# Patient Record
Sex: MALE | Race: WHITE | HISPANIC OR LATINO | Employment: OTHER | URBAN - METROPOLITAN AREA
[De-identification: names, ages, dates, MRNs, and addresses within clinical notes are randomized per-mention and may not be internally consistent; named-entity substitution may affect disease eponyms.]

---

## 2022-03-29 ENCOUNTER — HOSPITAL ENCOUNTER (INPATIENT)
Facility: MEDICAL CENTER | Age: 67
LOS: 5 days | DRG: 871 | End: 2022-04-03
Attending: EMERGENCY MEDICINE | Admitting: INTERNAL MEDICINE
Payer: COMMERCIAL

## 2022-03-29 ENCOUNTER — APPOINTMENT (OUTPATIENT)
Dept: RADIOLOGY | Facility: MEDICAL CENTER | Age: 67
DRG: 871 | End: 2022-03-29
Attending: EMERGENCY MEDICINE
Payer: COMMERCIAL

## 2022-03-29 DIAGNOSIS — I25.83 CORONARY ARTERY DISEASE DUE TO LIPID RICH PLAQUE: ICD-10-CM

## 2022-03-29 DIAGNOSIS — J96.01 SEPSIS WITH ACUTE HYPOXIC RESPIRATORY FAILURE WITHOUT SEPTIC SHOCK, DUE TO UNSPECIFIED ORGANISM (HCC): Primary | ICD-10-CM

## 2022-03-29 DIAGNOSIS — R65.20 SEPSIS WITH ACUTE HYPOXIC RESPIRATORY FAILURE WITHOUT SEPTIC SHOCK, DUE TO UNSPECIFIED ORGANISM (HCC): Primary | ICD-10-CM

## 2022-03-29 DIAGNOSIS — R06.03 RESPIRATORY DISTRESS: ICD-10-CM

## 2022-03-29 DIAGNOSIS — I50.33 CHF (CONGESTIVE HEART FAILURE), NYHA CLASS I, ACUTE ON CHRONIC, DIASTOLIC (HCC): ICD-10-CM

## 2022-03-29 DIAGNOSIS — R09.02 HYPOXIA: ICD-10-CM

## 2022-03-29 DIAGNOSIS — I50.20 HFREF (HEART FAILURE WITH REDUCED EJECTION FRACTION) (HCC): ICD-10-CM

## 2022-03-29 DIAGNOSIS — A41.9 SEPSIS WITH ACUTE HYPOXIC RESPIRATORY FAILURE WITHOUT SEPTIC SHOCK, DUE TO UNSPECIFIED ORGANISM (HCC): Primary | ICD-10-CM

## 2022-03-29 DIAGNOSIS — I21.4 NSTEMI (NON-ST ELEVATED MYOCARDIAL INFARCTION) (HCC): ICD-10-CM

## 2022-03-29 DIAGNOSIS — N17.9 AKI (ACUTE KIDNEY INJURY) (HCC): ICD-10-CM

## 2022-03-29 DIAGNOSIS — R73.9 HYPERGLYCEMIA: ICD-10-CM

## 2022-03-29 DIAGNOSIS — I25.10 CORONARY ARTERY DISEASE DUE TO LIPID RICH PLAQUE: ICD-10-CM

## 2022-03-29 DIAGNOSIS — I50.9 ACUTE CONGESTIVE HEART FAILURE, UNSPECIFIED HEART FAILURE TYPE (HCC): ICD-10-CM

## 2022-03-29 PROBLEM — J18.9 PNEUMONIA: Status: ACTIVE | Noted: 2022-03-29

## 2022-03-29 LAB
ALBUMIN SERPL BCP-MCNC: 4.7 G/DL (ref 3.2–4.9)
ALBUMIN/GLOB SERPL: 1.7 G/DL
ALP SERPL-CCNC: 94 U/L (ref 30–99)
ALT SERPL-CCNC: 80 U/L (ref 2–50)
ANION GAP SERPL CALC-SCNC: 18 MMOL/L (ref 7–16)
APPEARANCE UR: CLEAR
AST SERPL-CCNC: 82 U/L (ref 12–45)
BACTERIA #/AREA URNS HPF: NEGATIVE /HPF
BASE EXCESS BLDV CALC-SCNC: -7 MMOL/L
BASOPHILS # BLD AUTO: 0.1 % (ref 0–1.8)
BASOPHILS # BLD: 0.02 K/UL (ref 0–0.12)
BILIRUB SERPL-MCNC: 0.3 MG/DL (ref 0.1–1.5)
BILIRUB UR QL STRIP.AUTO: NEGATIVE
BODY TEMPERATURE: 98 CENTIGRADE
BUN SERPL-MCNC: 24 MG/DL (ref 8–22)
CALCIUM SERPL-MCNC: 8.9 MG/DL (ref 8.5–10.5)
CHLORIDE SERPL-SCNC: 102 MMOL/L (ref 96–112)
CO2 SERPL-SCNC: 18 MMOL/L (ref 20–33)
COLOR UR: YELLOW
CREAT SERPL-MCNC: 1.45 MG/DL (ref 0.5–1.4)
D DIMER PPP IA.FEU-MCNC: 1.42 UG/ML (FEU) (ref 0–0.5)
EKG IMPRESSION: NORMAL
EKG IMPRESSION: NORMAL
EOSINOPHIL # BLD AUTO: 0 K/UL (ref 0–0.51)
EOSINOPHIL NFR BLD: 0 % (ref 0–6.9)
EPI CELLS #/AREA URNS HPF: NEGATIVE /HPF
ERYTHROCYTE [DISTWIDTH] IN BLOOD BY AUTOMATED COUNT: 46.8 FL (ref 35.9–50)
FLUAV RNA SPEC QL NAA+PROBE: NEGATIVE
FLUBV RNA SPEC QL NAA+PROBE: NEGATIVE
GFR SERPLBLD CREATININE-BSD FMLA CKD-EPI: 53 ML/MIN/1.73 M 2
GLOBULIN SER CALC-MCNC: 2.8 G/DL (ref 1.9–3.5)
GLUCOSE BLD STRIP.AUTO-MCNC: 498 MG/DL (ref 65–99)
GLUCOSE SERPL-MCNC: 622 MG/DL (ref 65–99)
GLUCOSE UR STRIP.AUTO-MCNC: >=1000 MG/DL
HCO3 BLDV-SCNC: 20 MMOL/L (ref 24–28)
HCT VFR BLD AUTO: 44.9 % (ref 42–52)
HGB BLD-MCNC: 14.7 G/DL (ref 14–18)
HYALINE CASTS #/AREA URNS LPF: ABNORMAL /LPF
IMM GRANULOCYTES # BLD AUTO: 0.08 K/UL (ref 0–0.11)
IMM GRANULOCYTES NFR BLD AUTO: 0.5 % (ref 0–0.9)
INR PPP: 1.01 (ref 0.87–1.13)
KETONES UR STRIP.AUTO-MCNC: 15 MG/DL
LACTATE BLD-SCNC: 3.2 MMOL/L (ref 0.5–2)
LACTATE BLD-SCNC: 4.3 MMOL/L (ref 0.5–2)
LEUKOCYTE ESTERASE UR QL STRIP.AUTO: NEGATIVE
LYMPHOCYTES # BLD AUTO: 0.54 K/UL (ref 1–4.8)
LYMPHOCYTES NFR BLD: 3.6 % (ref 22–41)
MCH RBC QN AUTO: 31.2 PG (ref 27–33)
MCHC RBC AUTO-ENTMCNC: 32.7 G/DL (ref 33.7–35.3)
MCV RBC AUTO: 95.3 FL (ref 81.4–97.8)
MICRO URNS: ABNORMAL
MONOCYTES # BLD AUTO: 1.21 K/UL (ref 0–0.85)
MONOCYTES NFR BLD AUTO: 8.1 % (ref 0–13.4)
NEUTROPHILS # BLD AUTO: 13.18 K/UL (ref 1.82–7.42)
NEUTROPHILS NFR BLD: 87.7 % (ref 44–72)
NITRITE UR QL STRIP.AUTO: NEGATIVE
NRBC # BLD AUTO: 0 K/UL
NRBC BLD-RTO: 0 /100 WBC
NT-PROBNP SERPL IA-MCNC: 8285 PG/ML (ref 0–125)
PCO2 BLDV: 46.4 MMHG (ref 41–51)
PH BLDV: 7.26 [PH] (ref 7.31–7.45)
PH UR STRIP.AUTO: 5 [PH] (ref 5–8)
PLATELET # BLD AUTO: 330 K/UL (ref 164–446)
PMV BLD AUTO: 10.1 FL (ref 9–12.9)
PO2 BLDV: 30.4 MMHG (ref 25–40)
POTASSIUM SERPL-SCNC: 5.9 MMOL/L (ref 3.6–5.5)
PROT SERPL-MCNC: 7.5 G/DL (ref 6–8.2)
PROT UR QL STRIP: 30 MG/DL
PROTHROMBIN TIME: 13 SEC (ref 12–14.6)
RBC # BLD AUTO: 4.71 M/UL (ref 4.7–6.1)
RBC # URNS HPF: ABNORMAL /HPF
RBC UR QL AUTO: ABNORMAL
RSV RNA SPEC QL NAA+PROBE: NEGATIVE
SAO2 % BLDV: 48 %
SARS-COV-2 RNA RESP QL NAA+PROBE: NOTDETECTED
SODIUM SERPL-SCNC: 138 MMOL/L (ref 135–145)
SP GR UR STRIP.AUTO: 1.03
SPECIMEN SOURCE: NORMAL
TROPONIN T SERPL-MCNC: 768 NG/L (ref 6–19)
TROPONIN T SERPL-MCNC: 887 NG/L (ref 6–19)
UROBILINOGEN UR STRIP.AUTO-MCNC: 0.2 MG/DL
WBC # BLD AUTO: 15 K/UL (ref 4.8–10.8)
WBC #/AREA URNS HPF: ABNORMAL /HPF

## 2022-03-29 PROCEDURE — 93005 ELECTROCARDIOGRAM TRACING: CPT | Performed by: EMERGENCY MEDICINE

## 2022-03-29 PROCEDURE — A9270 NON-COVERED ITEM OR SERVICE: HCPCS | Performed by: EMERGENCY MEDICINE

## 2022-03-29 PROCEDURE — 770022 HCHG ROOM/CARE - ICU (200)

## 2022-03-29 PROCEDURE — 80053 COMPREHEN METABOLIC PANEL: CPT

## 2022-03-29 PROCEDURE — 36415 COLL VENOUS BLD VENIPUNCTURE: CPT

## 2022-03-29 PROCEDURE — 94760 N-INVAS EAR/PLS OXIMETRY 1: CPT

## 2022-03-29 PROCEDURE — 0241U HCHG SARS-COV-2 COVID-19 NFCT DS RESP RNA 4 TRGT MIC: CPT

## 2022-03-29 PROCEDURE — C9803 HOPD COVID-19 SPEC COLLECT: HCPCS | Performed by: EMERGENCY MEDICINE

## 2022-03-29 PROCEDURE — 82803 BLOOD GASES ANY COMBINATION: CPT

## 2022-03-29 PROCEDURE — 700117 HCHG RX CONTRAST REV CODE 255: Performed by: EMERGENCY MEDICINE

## 2022-03-29 PROCEDURE — 99292 CRITICAL CARE ADDL 30 MIN: CPT | Performed by: INTERNAL MEDICINE

## 2022-03-29 PROCEDURE — 71275 CT ANGIOGRAPHY CHEST: CPT

## 2022-03-29 PROCEDURE — 83605 ASSAY OF LACTIC ACID: CPT

## 2022-03-29 PROCEDURE — 85379 FIBRIN DEGRADATION QUANT: CPT

## 2022-03-29 PROCEDURE — 700102 HCHG RX REV CODE 250 W/ 637 OVERRIDE(OP): Performed by: EMERGENCY MEDICINE

## 2022-03-29 PROCEDURE — 93005 ELECTROCARDIOGRAM TRACING: CPT

## 2022-03-29 PROCEDURE — 85025 COMPLETE CBC W/AUTO DIFF WBC: CPT

## 2022-03-29 PROCEDURE — 81001 URINALYSIS AUTO W/SCOPE: CPT

## 2022-03-29 PROCEDURE — 99291 CRITICAL CARE FIRST HOUR: CPT | Performed by: INTERNAL MEDICINE

## 2022-03-29 PROCEDURE — 84484 ASSAY OF TROPONIN QUANT: CPT

## 2022-03-29 PROCEDURE — 96375 TX/PRO/DX INJ NEW DRUG ADDON: CPT

## 2022-03-29 PROCEDURE — 87040 BLOOD CULTURE FOR BACTERIA: CPT | Mod: 91

## 2022-03-29 PROCEDURE — 83880 ASSAY OF NATRIURETIC PEPTIDE: CPT

## 2022-03-29 PROCEDURE — 96365 THER/PROPH/DIAG IV INF INIT: CPT

## 2022-03-29 PROCEDURE — 99291 CRITICAL CARE FIRST HOUR: CPT

## 2022-03-29 PROCEDURE — 700105 HCHG RX REV CODE 258: Performed by: INTERNAL MEDICINE

## 2022-03-29 PROCEDURE — 85610 PROTHROMBIN TIME: CPT

## 2022-03-29 PROCEDURE — 82962 GLUCOSE BLOOD TEST: CPT

## 2022-03-29 PROCEDURE — 71045 X-RAY EXAM CHEST 1 VIEW: CPT

## 2022-03-29 PROCEDURE — 700111 HCHG RX REV CODE 636 W/ 250 OVERRIDE (IP): Performed by: EMERGENCY MEDICINE

## 2022-03-29 PROCEDURE — 700105 HCHG RX REV CODE 258: Performed by: EMERGENCY MEDICINE

## 2022-03-29 RX ORDER — FERROUS GLUCONATE 324(38)MG
324 TABLET ORAL
COMMUNITY

## 2022-03-29 RX ORDER — SODIUM CHLORIDE, SODIUM LACTATE, POTASSIUM CHLORIDE, CALCIUM CHLORIDE 600; 310; 30; 20 MG/100ML; MG/100ML; MG/100ML; MG/100ML
INJECTION, SOLUTION INTRAVENOUS CONTINUOUS
Status: DISCONTINUED | OUTPATIENT
Start: 2022-03-29 | End: 2022-03-30

## 2022-03-29 RX ORDER — PIOGLITAZONEHYDROCHLORIDE 45 MG/1
45 TABLET ORAL DAILY
COMMUNITY
End: 2022-04-29

## 2022-03-29 RX ORDER — AZITHROMYCIN 500 MG/1
500 INJECTION, POWDER, LYOPHILIZED, FOR SOLUTION INTRAVENOUS ONCE
Status: COMPLETED | OUTPATIENT
Start: 2022-03-29 | End: 2022-03-29

## 2022-03-29 RX ORDER — LOSARTAN POTASSIUM 100 MG/1
100 TABLET ORAL DAILY
COMMUNITY
End: 2022-04-29

## 2022-03-29 RX ORDER — TRAZODONE HYDROCHLORIDE 100 MG/1
100 TABLET ORAL NIGHTLY
COMMUNITY

## 2022-03-29 RX ORDER — SODIUM CHLORIDE 9 MG/ML
1000 INJECTION, SOLUTION INTRAVENOUS ONCE
Status: COMPLETED | OUTPATIENT
Start: 2022-03-29 | End: 2022-03-29

## 2022-03-29 RX ORDER — SIMVASTATIN 40 MG
40 TABLET ORAL NIGHTLY
COMMUNITY
End: 2022-04-05 | Stop reason: CLARIF

## 2022-03-29 RX ORDER — CEFTRIAXONE 2 G/1
2 INJECTION, POWDER, FOR SOLUTION INTRAMUSCULAR; INTRAVENOUS ONCE
Status: COMPLETED | OUTPATIENT
Start: 2022-03-29 | End: 2022-03-29

## 2022-03-29 RX ORDER — CYANOCOBALAMIN 1000 UG/ML
1000 INJECTION, SOLUTION INTRAMUSCULAR; SUBCUTANEOUS DAILY
COMMUNITY
End: 2022-04-29

## 2022-03-29 RX ORDER — BENZONATATE 100 MG/1
100 CAPSULE ORAL 3 TIMES DAILY PRN
Status: DISCONTINUED | OUTPATIENT
Start: 2022-03-29 | End: 2022-04-03 | Stop reason: HOSPADM

## 2022-03-29 RX ADMIN — CEFTRIAXONE SODIUM 2 G: 2 INJECTION, POWDER, FOR SOLUTION INTRAMUSCULAR; INTRAVENOUS at 21:00

## 2022-03-29 RX ADMIN — SODIUM CHLORIDE, POTASSIUM CHLORIDE, SODIUM LACTATE AND CALCIUM CHLORIDE: 600; 310; 30; 20 INJECTION, SOLUTION INTRAVENOUS at 23:00

## 2022-03-29 RX ADMIN — IOHEXOL 40 ML: 350 INJECTION, SOLUTION INTRAVENOUS at 21:42

## 2022-03-29 RX ADMIN — AZITHROMYCIN MONOHYDRATE 500 MG: 500 INJECTION, POWDER, LYOPHILIZED, FOR SOLUTION INTRAVENOUS at 20:56

## 2022-03-29 RX ADMIN — GUAIFENESIN 200 MG: 100 SOLUTION ORAL at 22:40

## 2022-03-29 RX ADMIN — SODIUM CHLORIDE 1000 ML: 9 INJECTION, SOLUTION INTRAVENOUS at 20:18

## 2022-03-29 RX ADMIN — INSULIN HUMAN 10 UNITS: 100 INJECTION, SOLUTION PARENTERAL at 22:34

## 2022-03-29 RX ADMIN — BENZONATATE 100 MG: 100 CAPSULE ORAL at 22:40

## 2022-03-29 ASSESSMENT — ENCOUNTER SYMPTOMS
PSYCHIATRIC NEGATIVE: 1
WEIGHT LOSS: 0
DIZZINESS: 0
ABDOMINAL PAIN: 0
MUSCULOSKELETAL NEGATIVE: 1
CHILLS: 0
COUGH: 0
SHORTNESS OF BREATH: 1
NAUSEA: 0
VOMITING: 0
BLURRED VISION: 0
FEVER: 0
SORE THROAT: 0
WHEEZING: 0
DOUBLE VISION: 0

## 2022-03-29 ASSESSMENT — FIBROSIS 4 INDEX: FIB4 SCORE: 1.81

## 2022-03-30 ENCOUNTER — PATIENT OUTREACH (OUTPATIENT)
Dept: HEALTH INFORMATION MANAGEMENT | Facility: OTHER | Age: 67
End: 2022-03-30

## 2022-03-30 ENCOUNTER — APPOINTMENT (OUTPATIENT)
Dept: CARDIOLOGY | Facility: MEDICAL CENTER | Age: 67
DRG: 871 | End: 2022-03-30
Attending: STUDENT IN AN ORGANIZED HEALTH CARE EDUCATION/TRAINING PROGRAM
Payer: COMMERCIAL

## 2022-03-30 PROBLEM — I50.20 HFREF (HEART FAILURE WITH REDUCED EJECTION FRACTION) (HCC): Status: ACTIVE | Noted: 2022-03-30

## 2022-03-30 PROBLEM — F10.20 ALCOHOL DEPENDENCY (HCC): Status: ACTIVE | Noted: 2022-03-30

## 2022-03-30 LAB
ALBUMIN SERPL BCP-MCNC: 4.2 G/DL (ref 3.2–4.9)
ALBUMIN/GLOB SERPL: 1.4 G/DL
ALP SERPL-CCNC: 87 U/L (ref 30–99)
ALT SERPL-CCNC: 74 U/L (ref 2–50)
AMPHET UR QL SCN: POSITIVE
ANION GAP SERPL CALC-SCNC: 13 MMOL/L (ref 7–16)
ANION GAP SERPL CALC-SCNC: 13 MMOL/L (ref 7–16)
ANION GAP SERPL CALC-SCNC: 15 MMOL/L (ref 7–16)
AST SERPL-CCNC: 84 U/L (ref 12–45)
B-OH-BUTYR SERPL-MCNC: 0.29 MMOL/L (ref 0.02–0.27)
B-OH-BUTYR SERPL-MCNC: 2.13 MMOL/L (ref 0.02–0.27)
BARBITURATES UR QL SCN: NEGATIVE
BASOPHILS # BLD AUTO: 0.4 % (ref 0–1.8)
BASOPHILS # BLD: 0.08 K/UL (ref 0–0.12)
BENZODIAZ UR QL SCN: NEGATIVE
BILIRUB SERPL-MCNC: 0.3 MG/DL (ref 0.1–1.5)
BUN SERPL-MCNC: 23 MG/DL (ref 8–22)
BUN SERPL-MCNC: 24 MG/DL (ref 8–22)
BUN SERPL-MCNC: 25 MG/DL (ref 8–22)
BZE UR QL SCN: NEGATIVE
CALCIUM SERPL-MCNC: 7.8 MG/DL (ref 8.5–10.5)
CALCIUM SERPL-MCNC: 9.1 MG/DL (ref 8.5–10.5)
CALCIUM SERPL-MCNC: 9.1 MG/DL (ref 8.5–10.5)
CANNABINOIDS UR QL SCN: NEGATIVE
CHLORIDE SERPL-SCNC: 106 MMOL/L (ref 96–112)
CHLORIDE SERPL-SCNC: 108 MMOL/L (ref 96–112)
CHLORIDE SERPL-SCNC: 109 MMOL/L (ref 96–112)
CHOLEST SERPL-MCNC: 207 MG/DL (ref 100–199)
CK SERPL-CCNC: 675 U/L (ref 0–154)
CO2 SERPL-SCNC: 21 MMOL/L (ref 20–33)
CO2 SERPL-SCNC: 22 MMOL/L (ref 20–33)
CO2 SERPL-SCNC: 23 MMOL/L (ref 20–33)
CREAT SERPL-MCNC: 0.86 MG/DL (ref 0.5–1.4)
CREAT SERPL-MCNC: 0.95 MG/DL (ref 0.5–1.4)
CREAT SERPL-MCNC: 1.05 MG/DL (ref 0.5–1.4)
EKG IMPRESSION: NORMAL
EOSINOPHIL # BLD AUTO: 0 K/UL (ref 0–0.51)
EOSINOPHIL NFR BLD: 0 % (ref 0–6.9)
ERYTHROCYTE [DISTWIDTH] IN BLOOD BY AUTOMATED COUNT: 47.8 FL (ref 35.9–50)
EST. AVERAGE GLUCOSE BLD GHB EST-MCNC: 220 MG/DL
ETHANOL BLD-MCNC: <10.1 MG/DL (ref 0–10)
GFR SERPLBLD CREATININE-BSD FMLA CKD-EPI: 78 ML/MIN/1.73 M 2
GFR SERPLBLD CREATININE-BSD FMLA CKD-EPI: 88 ML/MIN/1.73 M 2
GFR SERPLBLD CREATININE-BSD FMLA CKD-EPI: 96 ML/MIN/1.73 M 2
GLOBULIN SER CALC-MCNC: 2.9 G/DL (ref 1.9–3.5)
GLUCOSE BLD STRIP.AUTO-MCNC: 137 MG/DL (ref 65–99)
GLUCOSE BLD STRIP.AUTO-MCNC: 140 MG/DL (ref 65–99)
GLUCOSE BLD STRIP.AUTO-MCNC: 170 MG/DL (ref 65–99)
GLUCOSE BLD STRIP.AUTO-MCNC: 206 MG/DL (ref 65–99)
GLUCOSE BLD STRIP.AUTO-MCNC: 214 MG/DL (ref 65–99)
GLUCOSE BLD STRIP.AUTO-MCNC: 254 MG/DL (ref 65–99)
GLUCOSE BLD STRIP.AUTO-MCNC: 284 MG/DL (ref 65–99)
GLUCOSE BLD STRIP.AUTO-MCNC: 326 MG/DL (ref 65–99)
GLUCOSE SERPL-MCNC: 126 MG/DL (ref 65–99)
GLUCOSE SERPL-MCNC: 236 MG/DL (ref 65–99)
GLUCOSE SERPL-MCNC: 302 MG/DL (ref 65–99)
HBA1C MFR BLD: 9.3 % (ref 4–5.6)
HCT VFR BLD AUTO: 44.5 % (ref 42–52)
HDLC SERPL-MCNC: 71 MG/DL
HGB BLD-MCNC: 14.6 G/DL (ref 14–18)
IMM GRANULOCYTES # BLD AUTO: 0.1 K/UL (ref 0–0.11)
IMM GRANULOCYTES NFR BLD AUTO: 0.5 % (ref 0–0.9)
LACTATE BLD-SCNC: 2 MMOL/L (ref 0.5–2)
LDLC SERPL CALC-MCNC: 101 MG/DL
LYMPHOCYTES # BLD AUTO: 1 K/UL (ref 1–4.8)
LYMPHOCYTES NFR BLD: 4.8 % (ref 22–41)
MAGNESIUM SERPL-MCNC: 2.1 MG/DL (ref 1.5–2.5)
MAGNESIUM SERPL-MCNC: 2.2 MG/DL (ref 1.5–2.5)
MCH RBC QN AUTO: 31.3 PG (ref 27–33)
MCHC RBC AUTO-ENTMCNC: 32.8 G/DL (ref 33.7–35.3)
MCV RBC AUTO: 95.3 FL (ref 81.4–97.8)
METHADONE UR QL SCN: NEGATIVE
MONOCYTES # BLD AUTO: 1.87 K/UL (ref 0–0.85)
MONOCYTES NFR BLD AUTO: 9.1 % (ref 0–13.4)
NEUTROPHILS # BLD AUTO: 17.59 K/UL (ref 1.82–7.42)
NEUTROPHILS NFR BLD: 85.2 % (ref 44–72)
NRBC # BLD AUTO: 0 K/UL
NRBC BLD-RTO: 0 /100 WBC
OPIATES UR QL SCN: NEGATIVE
OXYCODONE UR QL SCN: NEGATIVE
PCP UR QL SCN: NEGATIVE
PHOSPHATE SERPL-MCNC: 2.3 MG/DL (ref 2.5–4.5)
PHOSPHATE SERPL-MCNC: 2.5 MG/DL (ref 2.5–4.5)
PLATELET # BLD AUTO: 298 K/UL (ref 164–446)
PMV BLD AUTO: 10 FL (ref 9–12.9)
POTASSIUM SERPL-SCNC: 3.8 MMOL/L (ref 3.6–5.5)
POTASSIUM SERPL-SCNC: 3.8 MMOL/L (ref 3.6–5.5)
POTASSIUM SERPL-SCNC: 5.1 MMOL/L (ref 3.6–5.5)
PROCALCITONIN SERPL-MCNC: 0.15 NG/ML
PROPOXYPH UR QL SCN: NEGATIVE
PROT SERPL-MCNC: 7.1 G/DL (ref 6–8.2)
RBC # BLD AUTO: 4.67 M/UL (ref 4.7–6.1)
SODIUM SERPL-SCNC: 142 MMOL/L (ref 135–145)
SODIUM SERPL-SCNC: 144 MMOL/L (ref 135–145)
SODIUM SERPL-SCNC: 144 MMOL/L (ref 135–145)
TRIGL SERPL-MCNC: 177 MG/DL (ref 0–149)
TROPONIN T SERPL-MCNC: 943 NG/L (ref 6–19)
WBC # BLD AUTO: 20.6 K/UL (ref 4.8–10.8)

## 2022-03-30 PROCEDURE — 93010 ELECTROCARDIOGRAM REPORT: CPT | Performed by: INTERNAL MEDICINE

## 2022-03-30 PROCEDURE — 85025 COMPLETE CBC W/AUTO DIFF WBC: CPT

## 2022-03-30 PROCEDURE — 700102 HCHG RX REV CODE 250 W/ 637 OVERRIDE(OP): Performed by: NURSE PRACTITIONER

## 2022-03-30 PROCEDURE — 82010 KETONE BODYS QUAN: CPT

## 2022-03-30 PROCEDURE — 83605 ASSAY OF LACTIC ACID: CPT

## 2022-03-30 PROCEDURE — 93306 TTE W/DOPPLER COMPLETE: CPT | Mod: 26 | Performed by: INTERNAL MEDICINE

## 2022-03-30 PROCEDURE — 94660 CPAP INITIATION&MGMT: CPT

## 2022-03-30 PROCEDURE — 82077 ASSAY SPEC XCP UR&BREATH IA: CPT

## 2022-03-30 PROCEDURE — 84145 PROCALCITONIN (PCT): CPT

## 2022-03-30 PROCEDURE — 80307 DRUG TEST PRSMV CHEM ANLYZR: CPT

## 2022-03-30 PROCEDURE — 99292 CRITICAL CARE ADDL 30 MIN: CPT | Performed by: INTERNAL MEDICINE

## 2022-03-30 PROCEDURE — 700105 HCHG RX REV CODE 258: Performed by: INTERNAL MEDICINE

## 2022-03-30 PROCEDURE — 80053 COMPREHEN METABOLIC PANEL: CPT

## 2022-03-30 PROCEDURE — 770022 HCHG ROOM/CARE - ICU (200)

## 2022-03-30 PROCEDURE — 80048 BASIC METABOLIC PNL TOTAL CA: CPT

## 2022-03-30 PROCEDURE — 80061 LIPID PANEL: CPT

## 2022-03-30 PROCEDURE — 82550 ASSAY OF CK (CPK): CPT

## 2022-03-30 PROCEDURE — 700105 HCHG RX REV CODE 258: Performed by: STUDENT IN AN ORGANIZED HEALTH CARE EDUCATION/TRAINING PROGRAM

## 2022-03-30 PROCEDURE — 99291 CRITICAL CARE FIRST HOUR: CPT | Performed by: NURSE PRACTITIONER

## 2022-03-30 PROCEDURE — 700111 HCHG RX REV CODE 636 W/ 250 OVERRIDE (IP): Performed by: STUDENT IN AN ORGANIZED HEALTH CARE EDUCATION/TRAINING PROGRAM

## 2022-03-30 PROCEDURE — 700102 HCHG RX REV CODE 250 W/ 637 OVERRIDE(OP): Performed by: INTERNAL MEDICINE

## 2022-03-30 PROCEDURE — 83735 ASSAY OF MAGNESIUM: CPT

## 2022-03-30 PROCEDURE — A9270 NON-COVERED ITEM OR SERVICE: HCPCS | Performed by: STUDENT IN AN ORGANIZED HEALTH CARE EDUCATION/TRAINING PROGRAM

## 2022-03-30 PROCEDURE — 84100 ASSAY OF PHOSPHORUS: CPT

## 2022-03-30 PROCEDURE — 99223 1ST HOSP IP/OBS HIGH 75: CPT | Performed by: INTERNAL MEDICINE

## 2022-03-30 PROCEDURE — 82962 GLUCOSE BLOOD TEST: CPT | Mod: 91

## 2022-03-30 PROCEDURE — A9270 NON-COVERED ITEM OR SERVICE: HCPCS | Performed by: INTERNAL MEDICINE

## 2022-03-30 PROCEDURE — A9270 NON-COVERED ITEM OR SERVICE: HCPCS | Performed by: NURSE PRACTITIONER

## 2022-03-30 PROCEDURE — 700111 HCHG RX REV CODE 636 W/ 250 OVERRIDE (IP): Performed by: NURSE PRACTITIONER

## 2022-03-30 PROCEDURE — 84484 ASSAY OF TROPONIN QUANT: CPT

## 2022-03-30 PROCEDURE — 93306 TTE W/DOPPLER COMPLETE: CPT

## 2022-03-30 PROCEDURE — 83036 HEMOGLOBIN GLYCOSYLATED A1C: CPT

## 2022-03-30 PROCEDURE — 700117 HCHG RX CONTRAST REV CODE 255: Performed by: STUDENT IN AN ORGANIZED HEALTH CARE EDUCATION/TRAINING PROGRAM

## 2022-03-30 PROCEDURE — 93005 ELECTROCARDIOGRAM TRACING: CPT | Performed by: NURSE PRACTITIONER

## 2022-03-30 PROCEDURE — 700102 HCHG RX REV CODE 250 W/ 637 OVERRIDE(OP): Performed by: STUDENT IN AN ORGANIZED HEALTH CARE EDUCATION/TRAINING PROGRAM

## 2022-03-30 RX ORDER — SODIUM CHLORIDE, SODIUM LACTATE, POTASSIUM CHLORIDE, CALCIUM CHLORIDE 600; 310; 30; 20 MG/100ML; MG/100ML; MG/100ML; MG/100ML
INJECTION, SOLUTION INTRAVENOUS CONTINUOUS
Status: DISCONTINUED | OUTPATIENT
Start: 2022-03-30 | End: 2022-03-30

## 2022-03-30 RX ORDER — DOXYCYCLINE 100 MG/1
100 TABLET ORAL EVERY 12 HOURS
Status: COMPLETED | OUTPATIENT
Start: 2022-03-30 | End: 2022-04-03

## 2022-03-30 RX ORDER — SPIRONOLACTONE 25 MG/1
12.5 TABLET ORAL
Status: DISCONTINUED | OUTPATIENT
Start: 2022-03-31 | End: 2022-04-03 | Stop reason: HOSPADM

## 2022-03-30 RX ORDER — MAGNESIUM SULFATE HEPTAHYDRATE 40 MG/ML
4 INJECTION, SOLUTION INTRAVENOUS
Status: DISCONTINUED | OUTPATIENT
Start: 2022-03-30 | End: 2022-03-30

## 2022-03-30 RX ORDER — MAGNESIUM SULFATE HEPTAHYDRATE 40 MG/ML
2 INJECTION, SOLUTION INTRAVENOUS
Status: DISCONTINUED | OUTPATIENT
Start: 2022-03-30 | End: 2022-03-30

## 2022-03-30 RX ORDER — ATORVASTATIN CALCIUM 40 MG/1
40 TABLET, FILM COATED ORAL EVERY EVENING
Status: DISCONTINUED | OUTPATIENT
Start: 2022-03-30 | End: 2022-03-31

## 2022-03-30 RX ORDER — FUROSEMIDE 10 MG/ML
40 INJECTION INTRAMUSCULAR; INTRAVENOUS ONCE
Status: COMPLETED | OUTPATIENT
Start: 2022-03-30 | End: 2022-03-30

## 2022-03-30 RX ORDER — LOSARTAN POTASSIUM 25 MG/1
25 TABLET ORAL
Status: DISCONTINUED | OUTPATIENT
Start: 2022-03-30 | End: 2022-04-03 | Stop reason: HOSPADM

## 2022-03-30 RX ORDER — AZITHROMYCIN 500 MG/5ML
500 INJECTION, POWDER, LYOPHILIZED, FOR SOLUTION INTRAVENOUS EVERY 24 HOURS
Status: DISCONTINUED | OUTPATIENT
Start: 2022-03-30 | End: 2022-03-30

## 2022-03-30 RX ORDER — DEXTROSE AND SODIUM CHLORIDE 10; .45 G/100ML; G/100ML
INJECTION, SOLUTION INTRAVENOUS CONTINUOUS
Status: ACTIVE | OUTPATIENT
Start: 2022-03-30 | End: 2022-03-30

## 2022-03-30 RX ORDER — CHOLECALCIFEROL (VITAMIN D3) 125 MCG
5 CAPSULE ORAL ONCE
Status: COMPLETED | OUTPATIENT
Start: 2022-03-30 | End: 2022-03-30

## 2022-03-30 RX ORDER — DEXTROSE, SODIUM CHLORIDE, SODIUM LACTATE, POTASSIUM CHLORIDE, AND CALCIUM CHLORIDE 5; .6; .31; .03; .02 G/100ML; G/100ML; G/100ML; G/100ML; G/100ML
INJECTION, SOLUTION INTRAVENOUS CONTINUOUS
Status: ACTIVE | OUTPATIENT
Start: 2022-03-30 | End: 2022-03-30

## 2022-03-30 RX ORDER — SODIUM CHLORIDE, SODIUM LACTATE, POTASSIUM CHLORIDE, AND CALCIUM CHLORIDE .6; .31; .03; .02 G/100ML; G/100ML; G/100ML; G/100ML
2000 INJECTION, SOLUTION INTRAVENOUS ONCE
Status: DISCONTINUED | OUTPATIENT
Start: 2022-03-30 | End: 2022-03-30

## 2022-03-30 RX ORDER — SODIUM CHLORIDE 9 MG/ML
2000 INJECTION, SOLUTION INTRAVENOUS ONCE
Status: DISCONTINUED | OUTPATIENT
Start: 2022-03-30 | End: 2022-03-30

## 2022-03-30 RX ORDER — CLOPIDOGREL BISULFATE 75 MG/1
75 TABLET ORAL DAILY
Status: DISCONTINUED | OUTPATIENT
Start: 2022-03-30 | End: 2022-04-03 | Stop reason: HOSPADM

## 2022-03-30 RX ORDER — ASPIRIN 325 MG
325 TABLET ORAL ONCE
Status: COMPLETED | OUTPATIENT
Start: 2022-03-30 | End: 2022-03-30

## 2022-03-30 RX ADMIN — Medication 5 MG: at 23:30

## 2022-03-30 RX ADMIN — ATORVASTATIN CALCIUM 40 MG: 40 TABLET, FILM COATED ORAL at 17:06

## 2022-03-30 RX ADMIN — INSULIN HUMAN 4 UNITS: 100 INJECTION, SOLUTION PARENTERAL at 21:58

## 2022-03-30 RX ADMIN — ENOXAPARIN SODIUM 80 MG: 80 INJECTION SUBCUTANEOUS at 09:24

## 2022-03-30 RX ADMIN — DOXYCYCLINE 100 MG: 100 TABLET, FILM COATED ORAL at 13:12

## 2022-03-30 RX ADMIN — INSULIN HUMAN 4 UNITS: 100 INJECTION, SOLUTION PARENTERAL at 18:48

## 2022-03-30 RX ADMIN — SODIUM CHLORIDE 4 UNITS/HR: 9 INJECTION, SOLUTION INTRAVENOUS at 05:57

## 2022-03-30 RX ADMIN — CLOPIDOGREL BISULFATE 75 MG: 75 TABLET ORAL at 09:24

## 2022-03-30 RX ADMIN — ENOXAPARIN SODIUM 80 MG: 80 INJECTION SUBCUTANEOUS at 17:08

## 2022-03-30 RX ADMIN — INSULIN HUMAN 3 UNITS: 100 INJECTION, SOLUTION PARENTERAL at 13:39

## 2022-03-30 RX ADMIN — INSULIN GLARGINE 30 UNITS: 100 INJECTION, SOLUTION SUBCUTANEOUS at 10:40

## 2022-03-30 RX ADMIN — ASPIRIN 325 MG: 325 TABLET ORAL at 05:37

## 2022-03-30 RX ADMIN — HUMAN ALBUMIN MICROSPHERES AND PERFLUTREN 3 ML: 10; .22 INJECTION, SOLUTION INTRAVENOUS at 11:31

## 2022-03-30 RX ADMIN — CEFTRIAXONE SODIUM 2 G: 10 INJECTION, POWDER, FOR SOLUTION INTRAVENOUS at 13:12

## 2022-03-30 RX ADMIN — LOSARTAN POTASSIUM 25 MG: 50 TABLET, FILM COATED ORAL at 17:06

## 2022-03-30 RX ADMIN — SODIUM CHLORIDE, SODIUM LACTATE, POTASSIUM CHLORIDE, CALCIUM CHLORIDE AND DEXTROSE MONOHYDRATE: 5; 600; 310; 30; 20 INJECTION, SOLUTION INTRAVENOUS at 08:39

## 2022-03-30 RX ADMIN — FUROSEMIDE 40 MG: 10 INJECTION, SOLUTION INTRAMUSCULAR; INTRAVENOUS at 09:24

## 2022-03-30 RX ADMIN — FUROSEMIDE 40 MG: 10 INJECTION, SOLUTION INTRAMUSCULAR; INTRAVENOUS at 11:22

## 2022-03-30 ASSESSMENT — PULMONARY FUNCTION TESTS
EPAP_CMH2O: 8

## 2022-03-30 ASSESSMENT — COGNITIVE AND FUNCTIONAL STATUS - GENERAL
DAILY ACTIVITIY SCORE: 24
SUGGESTED CMS G CODE MODIFIER DAILY ACTIVITY: CH

## 2022-03-30 ASSESSMENT — ENCOUNTER SYMPTOMS
TINGLING: 0
DIZZINESS: 0
HEADACHES: 0
BLOOD IN STOOL: 0
WEAKNESS: 0
SHORTNESS OF BREATH: 1
LOSS OF CONSCIOUSNESS: 0
VOMITING: 0
DIARRHEA: 0
FEVER: 0
COUGH: 0
BLURRED VISION: 0
CONSTIPATION: 0
NAUSEA: 0
CHILLS: 0
PALPITATIONS: 0
MYALGIAS: 1
ABDOMINAL PAIN: 0
SEIZURES: 0

## 2022-03-30 ASSESSMENT — PATIENT HEALTH QUESTIONNAIRE - PHQ9
2. FEELING DOWN, DEPRESSED, IRRITABLE, OR HOPELESS: NOT AT ALL
SUM OF ALL RESPONSES TO PHQ9 QUESTIONS 1 AND 2: 0
1. LITTLE INTEREST OR PLEASURE IN DOING THINGS: NOT AT ALL

## 2022-03-30 ASSESSMENT — PAIN DESCRIPTION - PAIN TYPE
TYPE: ACUTE PAIN
TYPE: ACUTE PAIN

## 2022-03-30 NOTE — ASSESSMENT & PLAN NOTE
Cardiology consult  Cardiomegaly noted on scans  Echo EF  30-35% with hypokinetic RV  Forced diuresis and wean off of BIPAP  Follow guideline directed medical therapy for HFrE

## 2022-03-30 NOTE — CONSULTS
Critical Care Consultation  (This note will serve as the admission H&P)    Date of consult: 3/29/2022    Referring Physician  Julius Prather M.D.    Reason for Consultation  I was asked to provide critical care consultation for acute hypoxic respiratory failure, sepsis, and hyperglycemia    History of Presenting Illness  66 y.o. male who presented 3/29/2022 with history of non-insulin-dependent diabetes mellitus who is visiting [place] at the [facility] this weekend.  He did not bring his Metformin or Actos.  About 12 hours prior to hospital admission he had a sudden onset of shortness of breath.  He states that he has had trouble breathing in the past but does not have asthma or COPD.  He is fully vaccinated to COVID-19.  He does describe vague chest pressure but denies sore throat cough nausea vomiting or diarrhea.  He does state that he is quite thirsty.    He drinks approximately 12-18 beers per day and does state that he gets the shakes if he does not drink enough alcohol.    While in the emergency department he underwent a CT angiogram of the chest which revealed no evidence of pulmonary edema.  However there are diffuse pulmonary intrainfiltrates that are suggestive of Covid 19.  Small bilateral pleural effusions are noted as well as cardiomegaly and hepatomegaly.  He was treated for sepsis with gentle hydration 1 L of crystalloid solution as well as ceftriaxone and azithromycin.    Critical care consultation was requested for further evaluation and management.    Code Status  No Order    Review of Systems  Review of Systems   Constitutional: Negative for chills, fever, malaise/fatigue and weight loss.   HENT: Negative for sore throat.    Eyes: Negative for blurred vision and double vision.   Respiratory: Positive for shortness of breath. Negative for cough and wheezing.    Cardiovascular: Positive for chest pain.   Gastrointestinal: Negative for abdominal pain, nausea and vomiting.   Genitourinary: Positive for  frequency.   Musculoskeletal: Negative.    Neurological: Negative for dizziness.   Psychiatric/Behavioral: Negative.        Past Medical History   has a past medical history of Diabetes (HCC), Hypertension, and Pulmonary edema.    Surgical History   has no past surgical history on file.    Family History  family history is not on file.    Social History   reports that he has been smoking cigarettes. He has been smoking about 0.50 packs per day. He has never used smokeless tobacco. He reports previous alcohol use. He reports previous drug use.    Medications  Home Medications     Reviewed by Yany Carpenter (Pharmacy Tech) on 03/29/22 at 2209  Med List Status: Complete   Medication Last Dose Status   cyanocobalamin (VITAMIN B-12) 1000 MCG/ML Solution >2 days Active   Empagliflozin 25 MG Tab >2 days Active   ferrous gluconate (FERGON) 324 (38 Fe) MG Tab >2 days Active   losartan (COZAAR) 100 MG Tab >2 days Active   metformin (GLUCOPHAGE) 1000 MG tablet >2 days Active   pioglitazone (ACTOS) 45 MG Tab >2 days Active   simvastatin (ZOCOR) 40 MG Tab >2 days Active   traZODone (DESYREL) 50 MG Tab >2 days Active              Current Facility-Administered Medications   Medication Dose Route Frequency Provider Last Rate Last Admin   • cefTRIAXone (Rocephin) syringe 2 g  2 g Intravenous Q24HRS Leonor Christine M.D.       • azithromycin (ZITHROMAX) 500 mg in D5W 250 mL IVPB premix  500 mg Intravenous Q24HRS Leonor Christine M.D.       • benzonatate (TESSALON) capsule 100 mg  100 mg Oral TID PRN Kennedy Villatoro   100 mg at 03/29/22 2240   • lactated ringers infusion   Intravenous Continuous Julius Prather M.D. 75 mL/hr at 03/29/22 2300 New Bag at 03/29/22 2300       Allergies  No Known Allergies    Vital Signs last 24 hours  Temp:  [36.7 °C (98 °F)] 36.7 °C (98 °F)  Pulse:  [115-126] 115  Resp:  [20-43] 23  BP: (115-181)/() 153/83  SpO2:  [91 %-99 %] 99 %    Physical Exam  Physical Exam  Constitutional:        General: He is in acute distress.      Comments: Speaking in 2-3 word sentences, mild accessory muscle use with prominent SCM use   HENT:      Mouth/Throat:      Mouth: Mucous membranes are dry.   Eyes:      General: No scleral icterus.     Extraocular Movements: Extraocular movements intact.      Pupils: Pupils are equal, round, and reactive to light.   Cardiovascular:      Rate and Rhythm: Regular rhythm. Tachycardia present.      Heart sounds: No murmur heard.    No friction rub. No gallop.      Comments: Bedside cardiac ultrasound reveals no pericardial effusion.  No obvious focal wall motion abnormality.  IVC diameter 1.2 cm and completely collapsing with respiratory variation.  Pulmonary:      Breath sounds: Rales present.   Abdominal:      General: There is distension.      Palpations: There is no mass.      Tenderness: There is no abdominal tenderness. There is no guarding.   Musculoskeletal:      Cervical back: Neck supple.      Right lower leg: No edema.      Left lower leg: No edema.   Skin:     General: Skin is warm and dry.   Neurological:      General: No focal deficit present.      Mental Status: He is alert and oriented to person, place, and time.      Cranial Nerves: No cranial nerve deficit.   Psychiatric:         Mood and Affect: Mood normal.         Behavior: Behavior normal.         Fluids    Intake/Output Summary (Last 24 hours) at 3/30/2022 0352  Last data filed at 3/29/2022 2211  Gross per 24 hour   Intake 1000 ml   Output --   Net 1000 ml       Laboratory  Recent Results (from the past 48 hour(s))   CBC with Differential    Collection Time: 03/29/22  8:00 PM   Result Value Ref Range    WBC 15.0 (H) 4.8 - 10.8 K/uL    RBC 4.71 4.70 - 6.10 M/uL    Hemoglobin 14.7 14.0 - 18.0 g/dL    Hematocrit 44.9 42.0 - 52.0 %    MCV 95.3 81.4 - 97.8 fL    MCH 31.2 27.0 - 33.0 pg    MCHC 32.7 (L) 33.7 - 35.3 g/dL    RDW 46.8 35.9 - 50.0 fL    Platelet Count 330 164 - 446 K/uL    MPV 10.1 9.0 - 12.9 fL     Neutrophils-Polys 87.70 (H) 44.00 - 72.00 %    Lymphocytes 3.60 (L) 22.00 - 41.00 %    Monocytes 8.10 0.00 - 13.40 %    Eosinophils 0.00 0.00 - 6.90 %    Basophils 0.10 0.00 - 1.80 %    Immature Granulocytes 0.50 0.00 - 0.90 %    Nucleated RBC 0.00 /100 WBC    Neutrophils (Absolute) 13.18 (H) 1.82 - 7.42 K/uL    Lymphs (Absolute) 0.54 (L) 1.00 - 4.80 K/uL    Monos (Absolute) 1.21 (H) 0.00 - 0.85 K/uL    Eos (Absolute) 0.00 0.00 - 0.51 K/uL    Baso (Absolute) 0.02 0.00 - 0.12 K/uL    Immature Granulocytes (abs) 0.08 0.00 - 0.11 K/uL    NRBC (Absolute) 0.00 K/uL   Comp Metabolic Panel    Collection Time: 03/29/22  8:00 PM   Result Value Ref Range    Sodium 138 135 - 145 mmol/L    Potassium 5.9 (H) 3.6 - 5.5 mmol/L    Chloride 102 96 - 112 mmol/L    Co2 18 (L) 20 - 33 mmol/L    Anion Gap 18.0 (H) 7.0 - 16.0    Glucose 622 (HH) 65 - 99 mg/dL    Bun 24 (H) 8 - 22 mg/dL    Creatinine 1.45 (H) 0.50 - 1.40 mg/dL    Calcium 8.9 8.5 - 10.5 mg/dL    AST(SGOT) 82 (H) 12 - 45 U/L    ALT(SGPT) 80 (H) 2 - 50 U/L    Alkaline Phosphatase 94 30 - 99 U/L    Total Bilirubin 0.3 0.1 - 1.5 mg/dL    Albumin 4.7 3.2 - 4.9 g/dL    Total Protein 7.5 6.0 - 8.2 g/dL    Globulin 2.8 1.9 - 3.5 g/dL    A-G Ratio 1.7 g/dL   LACTIC ACID    Collection Time: 03/29/22  8:00 PM   Result Value Ref Range    Lactic Acid 4.3 (HH) 0.5 - 2.0 mmol/L   TROPONIN    Collection Time: 03/29/22  8:00 PM   Result Value Ref Range    Troponin T 768 (H) 6 - 19 ng/L   proBrain Natriuretic Peptide, NT    Collection Time: 03/29/22  8:00 PM   Result Value Ref Range    NT-proBNP 8285 (H) 0 - 125 pg/mL   D-DIMER    Collection Time: 03/29/22  8:00 PM   Result Value Ref Range    D-Dimer Screen 1.42 (H) 0.00 - 0.50 ug/mL (FEU)   ESTIMATED GFR    Collection Time: 03/29/22  8:00 PM   Result Value Ref Range    GFR (CKD-EPI) 53 (A) >60 mL/min/1.73 m 2   Prothrombin time (INR)    Collection Time: 03/29/22  8:00 PM   Result Value Ref Range    PT 13.0 12.0 - 14.6 sec    INR 1.01 0.87  - 1.13   EKG    Collection Time: 22  8:00 PM   Result Value Ref Range    Report       [facility] Emergency Dept.    Test Date:  2022  Pt Name:    RAUL RODRIGUEZ                 Department: ER  MRN:        [id number]                      Room:       RD 04  Gender:     Male                         Technician: [id number]  :        1955                   Requested By:ER TRIAGE PROTOCOL  Order #:    [id number]                    Reading MD: Kennedy Villatoro    Measurements  Intervals                                Axis  Rate:       126                          P:          46  PA:         141                          QRS:        57  QRSD:       112                          T:          77  QT:         324  QTc:        470    Interpretive Statements  Sinus tachycardia  Inferolateral infarct, old  No previous ECG available for comparison  Electronically Signed On 3- 20:10:07 PDT by Kennedy Villatoro     CoV-2, FLU A/B, and RSV by PCR (2-4 Hours CEPHEID) : Collect NP swab in VTM    Collection Time: 22  8:20 PM    Specimen: Respirate   Result Value Ref Range    Influenza virus A RNA Negative Negative    Influenza virus B, PCR Negative Negative    RSV, PCR Negative Negative    SARS-CoV-2 by PCR NotDetected     SARS-CoV-2 Source NP Swab    EKG    Collection Time: 22  9:24 PM   Result Value Ref Range    Report       [facility] Emergency Dept.    Test Date:  2022  Pt Name:    RAUL RODRIGUEZ                 Department: ER  MRN:        [id number]                      Room:       RD 04  Gender:     Male                         Technician: [id number]  :        1955                   Requested By:ER TRIAGE PROTOCOL  Order #:    [id number]                    Reading MD: Kennedy Villatoro    Measurements  Intervals                                Axis  Rate:       120                          P:          45  PA:         107                          QRS:         66  QRSD:       106                          T:          77  QT:         332  QTc:        470    Interpretive Statements  SINUS TACHYCARDIA  BORDERLINE INTRAVENTRICULAR CONDUCTION DELAY  INFERIOR INFARCT, OLD  Compared to ECG 03/29/2022 20:00:11  No significant changes  Electronically Signed On 3- 23:04:02 PDT by Kennedy Villatoro     Lactic Acid    Collection Time: 03/29/22 10:29 PM   Result Value Ref Range    Lactic Acid 3.2 (H) 0.5 - 2.0 mmol/L   VENOUS BLOOD GAS    Collection Time: 03/29/22 10:29 PM   Result Value Ref Range    Venous Bg Ph 7.26 (L) 7.31 - 7.45    Venous Bg Pco2 46.4 41.0 - 51.0 mmHg    Venous Bg Po2 30.4 25.0 - 40.0 mmHg    Venous Bg O2 Saturation 48.0 %    Venous Bg Hco3 20 (L) 24 - 28 mmol/L    Venous Bg Base Excess -7 mmol/L    Body Temp 98.0 Centigrade   TROPONIN    Collection Time: 03/29/22 10:29 PM   Result Value Ref Range    Troponin T 887 (H) 6 - 19 ng/L   POCT glucose device results    Collection Time: 03/29/22 10:34 PM   Result Value Ref Range    POC Glucose, Blood 498 (HH) 65 - 99 mg/dL   Urinalysis    Collection Time: 03/29/22 11:25 PM    Specimen: Urine   Result Value Ref Range    Color Yellow     Character Clear     Specific Gravity 1.033 <1.035    Ph 5.0 5.0 - 8.0    Glucose >=1000 (A) Negative mg/dL    Ketones 15 (A) Negative mg/dL    Protein 30 (A) Negative mg/dL    Bilirubin Negative Negative    Urobilinogen, Urine 0.2 Negative    Nitrite Negative Negative    Leukocyte Esterase Negative Negative    Occult Blood Small (A) Negative    Micro Urine Req Microscopic    URINE MICROSCOPIC (W/UA)    Collection Time: 03/29/22 11:25 PM   Result Value Ref Range    WBC 0-2 (A) /hpf    RBC 0-2 (A) /hpf    Bacteria Negative None /hpf    Epithelial Cells Negative /hpf    Hyaline Cast 0-2 /lpf   POCT glucose device results    Collection Time: 03/29/22 11:46 PM   Result Value Ref Range    POC Glucose, Blood 326 (H) 65 - 99 mg/dL   DIAGNOSTIC ALCOHOL    Collection Time: 03/30/22  1:15 AM    Result Value Ref Range    Diagnostic Alcohol <10.1 0.0 - 10.0 mg/dL   Lactic Acid Every four hours after STAT order    Collection Time: 03/30/22  2:35 AM   Result Value Ref Range    Lactic Acid 2.0 0.5 - 2.0 mmol/L   BETA-HYDROXYBUTYRIC ACID    Collection Time: 03/30/22  2:35 AM   Result Value Ref Range    beta-Hydroxybutyric Acid 2.13 (H) 0.02 - 0.27 mmol/L   TROPONIN    Collection Time: 03/30/22  2:35 AM   Result Value Ref Range    Troponin T 943 (H) 6 - 19 ng/L       Imaging  CT-CTA CHEST PULMONARY ARTERY W/ RECONS   Final Result         1.  No pulmonary embolus appreciated.   2.  Extensive bilateral pulmonary infiltrates, appearance suggests possible Covid infiltrates.   3.  Small bilateral pleural effusions.   4.  Cardiomegaly   5.  Hepatomegaly and diffuse hepatic steatosis   6.  Atherosclerosis and atherosclerotic coronary artery disease.      DX-CHEST-PORTABLE (1 VIEW)   Final Result      1.  Mild interstitial pulmonary edema      2.  Enlarged cardiac silhouette          Assessment/Plan  * Pneumonia- (present on admission)  Assessment & Plan  Community-acquired pneumonia, CT findings consistent with COVID-19  Broad-spectrum antibiotics until cultures and sensitivities can guide de-escalation  Supplemental oxygen to maintain oxygen saturations greater than 93%.    Alcohol dependency (HCC)  Assessment & Plan  Monitor and treat for alcohol withdrawal syndrome as clinically indicated    Sepsis (HCC)  Assessment & Plan  This is Sepsis Present on admission  SIRS criteria identified on my evaluation include: Tachycardia, with heart rate greater than 90 BPM, Tachypnea, with respirations greater than 20 per minute and Leukocytosis, with WBC greater than 12,000  Source is pulmonary  Sepsis protocol initiated  Fluid resuscitation ordered per protocol  Crystalloid Fluid Administration: Patient has advanced or end-stage heart failure (with documentation of NYHA class III or symptoms with minimal exertion, Or NYHA class  IV or symptoms at rest or with activity), for this/these reason(s) it is unsafe for this patient to receive 30 mL/kg of fluid  Partial Fluid Dose Given: 15 mL/kg per current or ideal body weight, patient to be reassessed shortly after completion of partial fluid bolus to ensure adequacy of resuscitation  IV antibiotics as appropriate for source of sepsis  Reassessment: I have reassessed the patient's hemodynamic status          Hyperglycemia  Assessment & Plan  Medication noncompliance  Clinically dehydrated  Judicious volume resuscitation  Monitor glycemic control with insulin therapy  Anion gap may be due to just lactic acidosis versus addition of ketones  Check serum ketones    NSTEMI (non-ST elevated myocardial infarction) (HCC)  Assessment & Plan  Significantly elevated troponin and BNP  New Q waves in the inferior leads compared to 2020, silent MI recently?  Trend troponins  Echocardiogram  Cardiology consultation      Discussed patient condition and risk of morbidity and/or mortality with Hospitalist and ERP.      The patient remains critically ill,  I have assessed and reassessed the blood pressure,  cardiovascular status, labs and response to interventions. This patient remains at high risk for worsening shock and death without the above critical care interventions.    Critical care time 90 minutes in directly providing and coordinating critical care and extensive data review.  No time overlap and excludes procedures.

## 2022-03-30 NOTE — CARE PLAN
The patient is Watcher - Medium risk of patient condition declining or worsening    Shift Goals  Clinical Goals: Get off insulin gtt, reduce O2 need  Patient Goals: Drink water/eat food  Family Goals: THOM    Progress made toward(s) clinical / shift goals:  Off insulin gtt, subQ insulin, diet ordered, BIPAP off, SpO2 stable on 6L NC      Problem: Fluid Volume  Goal: Fluid volume balance will be maintained  Outcome: Progressing     Problem: Respiratory  Goal: Patient will achieve/maintain optimum respiratory ventilation and gas exchange  Outcome: Progressing     Problem: Physical Regulation  Goal: Diagnostic test results will improve  Outcome: Progressing     Problem: Skin Integrity  Goal: Skin integrity is maintained or improved  Outcome: Progressing

## 2022-03-30 NOTE — HEART FAILURE PROGRAM
Patient with [facility] coverage. Notes indicate that he is visiting the [facility] and [place] [facility], patient does have a local address in the facesheet.    Primary reason for admission is noted by critical care consult to be Pneumonia. Additionally noted to have hyperglycemia with BS of 524 and decompensation of heart failure.    Providers: below are Guideline Directed Medical Therapy (GDMT) for HFrEF. If any cannot be prescribed by discharge, would you please note the clinical reason for each in your discharge summary? Thanks! Denise  • Evidence Based Beta Blocker (bisoprolol, carvedilol, or toprol xl), for EF of 40% or less  • HENNY - I, for EF of 40% or less ARNI is preferred If not cost prohibitive for patient  • SGLT2 inhibitor with proven ASCVD, HF, or DKD benefit Jardiance/empagliflozin): If not cost prohibitive for patient  • Aldosterone antagonist, for EF of 35% or less, if applicable  • Anticoagulation for atrial arrhythmia, if applicable  • Glycemic control for DM + HF, if applicable  • Lipid lowering medication for DM + HF, if applicable  • Hydralazine Hydrochloride/Isosorbide Dinitrate. The combination of hydralazine and isosorbide- dinitrate is recommended to reduce morbidity and mortality for patients self-described  Americans with NYHA class III-IV HFrEF (EF 40% or less), receiving optimal therapy with ACE inhibitors and beta blockers, unless contraindicated (Class I, JIGNESH: A).  • Pneumococcal vaccine, if not previously received  • Influenza vaccine for current season, if not previously received  • Smoking cessation counseling documented, if applicable  • Device screening, if applicable  • Referral to disease management program specializing in heart failure care- requested that schedulers notify me if patient cannot be scheduled at the [facility]

## 2022-03-30 NOTE — ED NOTES
Report given to Perez MACKEY, Pt to [id number] via Perez MACKEY. Pt with all belongings in possession. Pt is on cardiac monitoring and non-re breather at 10L. Report has been given to Perez MACKEY

## 2022-03-30 NOTE — ED NOTES
Isabel from Lab called with critical result of Glucose 622 and Troponin 768 at 2119. Critical lab result read back to Isabel.   Dr. Villatoro notified of critical lab result at 2119.  Critical lab result read back by Dr. Villatoro.

## 2022-03-30 NOTE — CARE PLAN
The patient is Watcher - Medium risk of patient condition declining or worsening         Progress made toward(s) clinical / shift goals: Patient with improvement on bipap. Insulin gtt started     Patient is not progressing towards the following goals:  Continues to be very tachypneic

## 2022-03-30 NOTE — ED TRIAGE NOTES
"Chief Complaint   Patient presents with   • Shortness of Breath   • Cough   • Weakness       BIB EMS to [facility], pt on monitor and in gown, labs drawn and sent. [place] [facility] [place] reports this morning woke up and felt like he had a fever. States he has been SOB and having a cough all day, and reports having weakness. Hx of HTN, DM and pulmonary edema. Pt has albuterol prescribed and is on metformin. EMS arrived and pt's room air sat is 79%, placed pt on 21L of oxygen. When pt arrived ERP lowered to 5L NC 90%.  Pt is COVID vaccinated. Pt has been uncompliant with meds at home. FSBS 527. Pt reports having an episode of black stool. Denies pain. GCS 15//    BP (!) 172/106   Pulse (!) 126   Resp (!) 41   Ht 1.676 m (5' 6\")   Wt 83.9 kg (185 lb)   SpO2 91%   BMI 29.86 kg/m²   "

## 2022-03-30 NOTE — ASSESSMENT & PLAN NOTE
Significantly elevated troponin and BNP  Abnormal ECG, inferior, lateral Q waves with early R wave progression-possible old posterior MI  CTA showed calcified coronary artery disease including left main, proximal LAD, proximal circumflex  Trend troponins  Lovenox x48 hours as well as Plavix and ASA for NSTEMI  Echocardiogram-EF 30-35% with wall motion abnormalities and hypokinetic RV  Cardiology consultation- discussed findings with cardiolog, Dr. Bailey . At this time pt not a candidate for cath lab due to meth use and will be directed to fu with out pt cardiology

## 2022-03-30 NOTE — DISCHARGE PLANNING
Anticipated Discharge Disposition: possible home when medically cleared; ICU level of care     Action: RN CM attended IDT rounds and reviewed patient chart.  Patient is a [facility] patient and uses [facility] pharmacy.  [place] for the first time in 3 years when all of his symptoms began.  Patient has a history of alcohol abuse and drinks 12-18 beers per day.  Independent with ALD's prior to admission.      Patient requiring bipap, insulin gtt, and ICU level of care.  Diabetic education and heart failure follow-up at discharge needed.        Barriers to Discharge: medical clearance; ICU level of care    Plan: HCM to continue to follow and assist with discharge planning needs and barriers       Care Transition Team Assessment  Emergency Contact  Cornelia Lam (friend) [phone]    Information Source  Orientation Level: Oriented X4  Information Given By: Patient  Informant's Name: Tucker Frederick  Who is responsible for making decisions for patient? : Patient    Readmission Evaluation  Is this a readmission?: No    Elopement Risk  Legal Hold: No  Ambulatory or Self Mobile in Wheelchair: Yes  Disoriented: No  Psychiatric Symptoms: None  History of Wandering: No    Discharge Preparedness  What is your plan after discharge?: Home with help  What are your discharge supports?: Other (comment) (friend)  Prior Functional Level: Ambulatory,Independent with Activities of Daily Living  Difficulity with ADLs: None  Difficulity with IADLs: None    Functional Assesment  Prior Functional Level: Ambulatory,Independent with Activities of Daily Living    Finances  Financial Barriers to Discharge: No  Prescription Coverage: Yes    Advance Directive  Advance Directive?: None    Domestic Abuse  Have you ever been the victim of abuse or violence?: No    Psychological Assessment  History of Substance Abuse: Methamphetamine,Alcohol  Date Last Used - Alcohol:  (drinks 12-18 beers daily)  Date Last Used -  Methamphetamine: 3/29/2022  Substance Abuse Comments:  (pt reported using meth around 2am after not having used for 3 years)  History of Psychiatric Problems: No  Non-compliant with Treatment: Yes  Newly Diagnosed Illness: Yes    Discharge Risks or Barriers  Discharge risks or barriers?: Complex medical needs,Lives alone, no community support  Patient risk factors: Complex medical needs,Lives alone and no community support,Substance abuse    Anticipated Discharge Information  Discharge Disposition: D/T to home under A care in anticipation of covered skilled care (06)

## 2022-03-30 NOTE — CONSULTS
Consults   Cardiology Initial Consultation    Date of Service  3/30/2022    Referring Physician  JIMMY Dutta    Reason for Consultation  Nonstemi    History of Presenting Illness  Tucker Frederick is a 66 y.o. male admitted 3/29/2022 with SOB, respiratory distress requiring BiPAP.    Found to have bilateral pleural effusions and a CT consistent with possible Covid.  Treated for pneumonia.  High-sensitivity troponin at 800, 900.  No julienne chest pain.  Breathing improved significantly with BiPAP.    No personal history of cardiac disease.    Has uncontrolled diabetes, A1c 9.3.  Has mixed hyperlipidemia.  Currently using methamphetamines.  Father had an MI at 55.  Cigarette smoker.    Review of Systems  Review of Systems    All systems reviewed and negative.    Past Medical History   has a past medical history of Diabetes (HCC), Hypertension, and Pulmonary edema.    Surgical History   has no past surgical history on file.    Family History  family history includes Heart Disease in his father.      Social History   reports that he has been smoking cigarettes. He has been smoking about 0.50 packs per day. He has never used smokeless tobacco. He reports previous alcohol use. He reports previous drug use.    Medications  Prior to Admission Medications   Prescriptions Last Dose Informant Patient Reported? Taking?   Empagliflozin 25 MG Tab >2 days at [facility] Patient's Home Pharmacy Yes Yes   Sig: Take 25 mg by mouth every day.   cyanocobalamin (VITAMIN B-12) 1000 MCG/ML Solution >2 days at [facility] Patient's Home Pharmacy Yes Yes   Sig: Inject 1,000 mcg into the shoulder, thigh, or buttocks every day.   ferrous gluconate (FERGON) 324 (38 Fe) MG Tab >2 days at [facility] Patient's Home Pharmacy Yes Yes   Sig: Take 324 mg by mouth every morning with breakfast.   losartan (COZAAR) 100 MG Tab >2 days at [facility] Patient's Home Pharmacy Yes Yes   Sig: Take 100 mg by mouth every day.   metformin (GLUCOPHAGE) 1000 MG tablet >2 days at [facility] Patient's  Home Pharmacy Yes Yes   Sig: Take 1,000 mg by mouth 2 times a day with meals.   pioglitazone (ACTOS) 45 MG Tab >2 days at [facility] Patient's Home Pharmacy Yes Yes   Sig: Take 45 mg by mouth every day.   simvastatin (ZOCOR) 40 MG Tab >2 days at [facility] Patient's Home Pharmacy Yes Yes   Sig: Take 40 mg by mouth every evening.   traZODone (DESYREL) 50 MG Tab >2 days at [facility] Patient's Home Pharmacy Yes Yes   Sig: Take 50 mg by mouth every evening.      Facility-Administered Medications: None       Allergies  No Known Allergies    Vital signs in last 24 hours  Temp:  [36.7 °C (98 °F)-36.8 °C (98.2 °F)] 36.7 °C (98 °F)  Pulse:  [105-126] 111  Resp:  [20-71] 32  BP: (115-181)/() 164/100  SpO2:  [88 %-100 %] 88 %    Physical Exam  Physical Exam      General: No acute distress. Well nourished.  HEENT: EOM grossly intact, no scleral icterus, no pharyngeal erythema.   Neck: Unable to assess JVP due to habitus no bruits, trachea midline  CVS: RRR.  Very distant heart sounds no julienne M/R/G. No julienne LE edema.  2+ radial pulses, 2+ PT pulses  Resp: Mild increased work of breathing, breath sounds difficulty here with BiPAP  Abdomen: Soft, NT, no julienne hepatomegaly.  MSK/Ext: No clubbing or cyanosis.  Skin: Warm and dry, no rashes.  Neurological: CN III-XII grossly intact. No focal deficits.   Psych: A&O x 3, appropriate affect, good judgement      Lab Review  Lab Results   Component Value Date/Time    WBC 20.6 (H) 03/30/2022 05:45 AM    RBC 4.67 (L) 03/30/2022 05:45 AM    HEMOGLOBIN 14.6 03/30/2022 05:45 AM    HEMATOCRIT 44.5 03/30/2022 05:45 AM    MCV 95.3 03/30/2022 05:45 AM    MCH 31.3 03/30/2022 05:45 AM    MCHC 32.8 (L) 03/30/2022 05:45 AM    MPV 10.0 03/30/2022 05:45 AM      Lab Results   Component Value Date/Time    SODIUM 144 03/30/2022 10:32 AM    POTASSIUM 3.8 03/30/2022 10:32 AM    CHLORIDE 109 03/30/2022 10:32 AM    CO2 22 03/30/2022 10:32 AM    GLUCOSE 126 (H) 03/30/2022 10:32 AM    BUN 24 (H) 03/30/2022 10:32 AM     CREATININE 0.95 03/30/2022 10:32 AM      Lab Results   Component Value Date/Time    ASTSGOT 84 (H) 03/30/2022 05:45 AM    ALTSGPT 74 (H) 03/30/2022 05:45 AM     Lab Results   Component Value Date/Time    CHOLSTRLTOT 207 (H) 03/30/2022 05:45 AM     (H) 03/30/2022 05:45 AM    HDL 71 03/30/2022 05:45 AM    TRIGLYCERIDE 177 (H) 03/30/2022 05:45 AM    TROPONINT 943 (H) 03/30/2022 02:35 AM       Recent Labs     03/29/22 2000   NTPROBNP 8285*       Cardiac Imaging and Procedures Review  EKG:  My personal interpretation of the EKG dated 3-30-22 is sinus tach, NS IVCD, inferior and lateral Q waves, early R wave consistent with possible old posterior MI, baseline artifact    Echocardiogram: 3/30/2022  Normal left ventricular chamber size.  Moderately reduced left ventricular systolic function.  The left ventricular ejection fraction is visually estimated to be 30-  35%.  Global hypokinesis with severe hypokinesis proximal inferrior posterior   wall, .  Probably mildly dilated right venricular with reduced right ventricular   systolic function and hypokinetic right ventricular free wall.  No valvular abnormalities.   No prior study is available for comparison.       Imaging  EC-ECHOCARDIOGRAM COMPLETE W/ CONT   Final Result      CT-CTA CHEST PULMONARY ARTERY W/ RECONS   Final Result         1.  No pulmonary embolus appreciated.   2.  Extensive bilateral pulmonary infiltrates, appearance suggests possible Covid infiltrates.   3.  Small bilateral pleural effusions.   4.  Cardiomegaly   5.  Hepatomegaly and diffuse hepatic steatosis   6.  Atherosclerosis and atherosclerotic coronary artery disease.      DX-CHEST-PORTABLE (1 VIEW)   Final Result      1.  Mild interstitial pulmonary edema      2.  Enlarged cardiac silhouette            Assessment/Plan  -Pneumonia   -Probably acute CHF  -Nonstemi  -Abnormal ECG, inferior, lateral q waves early R wave progression with possible old posterior MI  -Suspect dilated cardiomyopathy  with LV dysfunction given cardiomegaly  -Methamphetamine use  -Calcified coronary disease seen on CT scan including left main, proximal LAD, proximal circumflex  -Mixed hyperlipidemia  -Uncontrolled diabetes  -Acute hypoxic respiratory failure      Plan:  -Hep gtt vs full lovenox for 48 hours for nonstemi  -Actively using meth, not candidate for LHC or ischemic evaluation  -Needs treatment for CAD that I can see on the CT scan  -His ECG is consistent with prior infarct, I would not be surprised if he has significant LV dysfunction with wall motion abnormalities  -Further medical therapy based on his echo  -Increase Lasix    Discussed with Abby Jacobs    Addendum: Echo with EF of 30 to 35% with wall motion abnormalities.  RV probably dilated with reduced function.  Medications adjusted.  Adding losartan for peripheral vasodilation and reduced EF, adding low-dose spironolactone, will start BB tomorrow after more euvolemic.     Cardiology will continue to follow along.      Thank you for allowing me to participate in the care of this patient.    Please contact me with any questions.    Macarena Bailey M.D.   Cardiologist, [facility]  [phone]

## 2022-03-30 NOTE — ED NOTES
Radha from Lab called with critical result of Lactic 4.3 at 2019. Critical lab result read back to Radha.   Dr. Villatoro notified of critical lab result at 2019.  Critical lab result read back by Dr. Villatoro.

## 2022-03-30 NOTE — ED NOTES
"Med rec completed per patient and home pharmacy ([facility])  Allergies reviewed  No PO Antibiotics in the last 30 days     Patient states he has not had any of his medications in \"About 2 days\"  "

## 2022-03-30 NOTE — ASSESSMENT & PLAN NOTE
This is Sepsis Present on admission  SIRS criteria identified on my evaluation include: Tachycardia, with heart rate greater than 90 BPM, Tachypnea, with respirations greater than 20 per minute and Leukocytosis, with WBC greater than 12,000  Source is pulmonary  Sepsis protocol initiated  Fluid resuscitation ordered per protocol  Crystalloid Fluid Administration: Patient has advanced or end-stage heart failure (with documentation of NYHA class III or symptoms with minimal exertion, Or NYHA class IV or symptoms at rest or with activity), for this/these reason(s) it is unsafe for this patient to receive 30 mL/kg of fluid  Partial Fluid Dose Given: 15 mL/kg per current or ideal body weight, patient to be reassessed shortly after completion of partial fluid bolus to ensure adequacy of resuscitation  IV antibiotics as appropriate for source of sepsis  Reassessment: I have reassessed the patient's hemodynamic status

## 2022-03-30 NOTE — HOSPITAL COURSE
Tucker Frederick is a 66 yom  with a history of non-insulin-dependent diabetes mellitus, alcohol abuse (12-18 beers a day), methamphetamine use, hypertension, mixed hyperlipidemia and pulmonary edema.  He presented to the ED 3/29/2022 with a chief complaint of SOB that began suddenly in the am and worsened throughout the day along with nonproductive cough, generalized weakness, and dizziness.  He denies any alleviating or exacerbating factors; he denies COPD, asthma, chest pain, nausea vomiting or diarrhea.  He has been fully vaccinated for Covid-19.  CT angio was done which was negative for pulmonary edema however he did have pulmonary infiltrates that looked suspicious of COVID-19; Small bilateral pleural effusions were also noted as well as cardiomegaly and hepatomegaly.  He was treated for sepsis with hydration as well as antibiotics.  He continued to experience shortness of breath and required BIPAP  and ICU monitoring

## 2022-03-30 NOTE — ED NOTES
COVID swab collected and send. 1L of NS bolus administered per MAR. Called sample handling to add lab.

## 2022-03-30 NOTE — PROGRESS NOTES
Critical Care Progress Note    Date of admission  3/29/2022    Chief Complaint  66 y.o. male admitted 3/29/2022 with shortness of breath    Hospital Course  Tucker Frederick is a 66 yom  with a history of non-insulin-dependent diabetes mellitus, alcohol abuse (12-18 beers a day), methamphetamine use, hypertension, mixed hyperlipidemia and pulmonary edema.  He presented to the ED 3/29/2022 with a chief complaint of SOB that began suddenly in the am and worsened throughout the day along with nonproductive cough, generalized weakness, and dizziness.  He denies any alleviating or exacerbating factors; he denies COPD, asthma, chest pain, nausea vomiting or diarrhea.  He has been fully vaccinated for Covid-19.  CT angio was done which was negative for pulmonary edema however he did have pulmonary infiltrates that looked suspicious of COVID-19; Small bilateral pleural effusions were also noted as well as cardiomegaly and hepatomegaly.  He was treated for sepsis with hydration as well as antibiotics.  He continued to experience shortness of breath and required BIPAP  and ICU monitoring       Interval Problem Update  *  Daily Multi discipline Rounding Report:    Neuro: intact  HR: 100-120's  SBP: 140-150s  Tmax: afebrile  GI: NPO, BM PTA  UOP: voiding - good output  Lines: none  Resp: Bipap 12/8, Fio2 100%  Vte: Lovenox rx dose   PPI/H2:not indicated   Antibx: azithro and C3- procal to trend    Plan of care:  Cardiology consult for increased troponin NSTEMI-we will start aspirin, statin, weight-based Lovenox, echocardiogram.   Forced diuresis.  Repeat EKG, repeat chest x-ray in a.m.  We will begin to transition off of insulin drip utilizing Lantus with high sliding scale will follow BMP today.  Patient will be allowed to eat.  Will wean BiPAP post Lasix administration.    Review of Systems  Review of Systems   Constitutional: Negative for chills and fever.   HENT: Negative for hearing loss.    Eyes: Negative for blurred vision.    Respiratory: Positive for shortness of breath. Negative for cough.    Cardiovascular: Negative for chest pain and palpitations.   Gastrointestinal: Negative for abdominal pain, blood in stool, constipation, diarrhea, melena, nausea and vomiting.   Genitourinary: Negative for urgency.   Musculoskeletal: Positive for myalgias.   Skin: Negative for rash.   Neurological: Negative for dizziness, tingling, seizures, loss of consciousness, weakness and headaches.        Vital Signs for last 24 hours   Temp:  [36.7 °C (98 °F)-36.8 °C (98.2 °F)] 36.7 °C (98 °F)  Pulse:  [105-126] 109  Resp:  [20-71] 24  BP: (115-181)/() 136/79  SpO2:  [91 %-100 %] 93 %    Hemodynamic parameters for last 24 hours       Respiratory Information for the last 24 hours       Physical Exam   Physical Exam  Vitals and nursing note reviewed.   HENT:      Head: Normocephalic and atraumatic.   Eyes:      General: Lids are normal.      Pupils: Pupils are equal, round, and reactive to light.   Neck:      Trachea: Trachea normal.   Cardiovascular:      Rate and Rhythm: Normal rate and regular rhythm.      Pulses:           Radial pulses are 2+ on the right side and 2+ on the left side.        Dorsalis pedis pulses are 2+ on the right side and 2+ on the left side.   Pulmonary:      Comments: BiPAP in place 12/8-satting 98% without any respiratory distress at this time  Chest:      Comments: Full chest rise and fall with respirations  Abdominal:      General: Bowel sounds are normal.      Palpations: Abdomen is soft.      Comments: Round large but soft abdomen.  Bowel sounds present   Genitourinary:     Comments: Voiding clear yellow urine  Musculoskeletal:      Cervical back: Normal range of motion and neck supple.   Skin:     General: Skin is warm.      Capillary Refill: Capillary refill takes less than 2 seconds.      Comments: Warm and dry    Neurological:      General: No focal deficit present.      Mental Status: He is alert and oriented to  person, place, and time. Mental status is at baseline.      GCS: GCS eye subscore is 4. GCS verbal subscore is 5. GCS motor subscore is 6.      Comments: JD 3mm  AAO X person place time and event   Psychiatric:         Attention and Perception: Attention normal.         Speech: Speech normal.         Behavior: Behavior normal. Behavior is cooperative.         Medications  Current Facility-Administered Medications   Medication Dose Route Frequency Provider Last Rate Last Admin   • [START ON 3/31/2022] aspirin EC (ECOTRIN) tablet 81 mg  81 mg Oral DAILY Leonor Christine M.D.       • atorvastatin (LIPITOR) tablet 40 mg  40 mg Oral Q EVENING Leonor Christine M.D.       • cefTRIAXone (Rocephin) syringe 2 g  2 g Intravenous Q24HRS Leonor Christine M.D.   2 g at 03/30/22 1312   • enoxaparin (LOVENOX) inj 80 mg  1 mg/kg Subcutaneous Q12HRS Abby Jacobs A.P.R.N.   80 mg at 03/30/22 0924   • clopidogrel (PLAVIX) tablet 75 mg  75 mg Oral DAILY Abby Jacobs A.P.R.N.   75 mg at 03/30/22 0924   • insulin GLARGINE (Lantus,Semglee) injection  30 Units Subcutaneous QAM INSULIN Cipriano Abarca M.D.   30 Units at 03/30/22 1040   • insulin regular (HumuLIN R,NovoLIN R) injection  3-14 Units Subcutaneous 4X/DAY GABRIELA Abarca M.D.   3 Units at 03/30/22 1339    And   • dextrose 10 % BOLUS 25 g  25 g Intravenous Q15 MIN PRN Cipriano Abarca M.D.       • doxycycline monohydrate (ADOXA) tablet 100 mg  100 mg Oral Q12HRS Abby Jacobs A.P.R.N.   100 mg at 03/30/22 1312   • benzonatate (TESSALON) capsule 100 mg  100 mg Oral TID PRN Kennedy Villatoro   100 mg at 03/29/22 2240       Fluids    Intake/Output Summary (Last 24 hours) at 3/30/2022 1452  Last data filed at 3/30/2022 1400  Gross per 24 hour   Intake 2547.83 ml   Output 2250 ml   Net 297.83 ml       Laboratory      Recent Labs     03/30/22  1032   CPKTOTAL 675*     Recent Labs     03/29/22 2000 03/30/22  0545 03/30/22  1032   SODIUM 138 144 144   POTASSIUM 5.9* 5.1 3.8    CHLORIDE 102 108 109   CO2 18* 21 22   BUN 24* 25* 24*   CREATININE 1.45* 1.05 0.95   MAGNESIUM  --  2.2 2.1   PHOSPHORUS  --  2.5 2.3*   CALCIUM 8.9 9.1 9.1     Recent Labs     03/29/22 2000 03/30/22  0545 03/30/22  1032   ALTSGPT 80* 74*  --    ASTSGOT 82* 84*  --    ALKPHOSPHAT 94 87  --    TBILIRUBIN 0.3 0.3  --    GLUCOSE 622* 302* 126*     Recent Labs     03/29/22 2000 03/30/22  0545   WBC 15.0* 20.6*   NEUTSPOLYS 87.70* 85.20*   LYMPHOCYTES 3.60* 4.80*   MONOCYTES 8.10 9.10   EOSINOPHILS 0.00 0.00   BASOPHILS 0.10 0.40   ASTSGOT 82* 84*   ALTSGPT 80* 74*   ALKPHOSPHAT 94 87   TBILIRUBIN 0.3 0.3     Recent Labs     03/29/22 2000 03/30/22  0545   RBC 4.71 4.67*   HEMOGLOBIN 14.7 14.6   HEMATOCRIT 44.9 44.5   PLATELETCT 330 298   PROTHROMBTM 13.0  --    INR 1.01  --        Imaging  X-Ray:  I have personally reviewed the images and compared with prior images.  EKG:  I have personally reviewed the images and compared with prior images.  CT:    Reviewed  Echo:   Reviewed  Ultrasound:  Reviewed    Assessment/Plan  * Pneumonia- (present on admission)  Assessment & Plan  Community-acquired pneumonia, CT findings consistent with COVID-19-negative PCR for COVID-19  Broad-spectrum antibiotics until cultures and sensitivities can guide de-escalation  Supplemental oxygen to maintain oxygen saturations greater than 93%.  Cultures pending    HFrEF (heart failure with reduced ejection fraction) ([facility])  Assessment & Plan  Cardiology consult  Cardiomegaly noted on scans  Echo EF  30-35% with hypokinetic RV  Forced diuresis and wean off of BIPAP  Follow guideline directed medical therapy for HFrE    Alcohol dependency ([facility])  Assessment & Plan  Monitor and treat for alcohol withdrawal syndrome as clinically indicated    Sepsis ([facility])  Assessment & Plan  This is Sepsis Present on admission  SIRS criteria identified on my evaluation include: Tachycardia, with heart rate greater than 90 BPM, Tachypnea, with respirations greater  than 20 per minute and Leukocytosis, with WBC greater than 12,000  Source is pulmonary  Sepsis protocol initiated  Fluid resuscitation ordered per protocol  Crystalloid Fluid Administration: Patient has advanced or end-stage heart failure (with documentation of NYHA class III or symptoms with minimal exertion, Or NYHA class IV or symptoms at rest or with activity), for this/these reason(s) it is unsafe for this patient to receive 30 mL/kg of fluid  Partial Fluid Dose Given: 15 mL/kg per current or ideal body weight, patient to be reassessed shortly after completion of partial fluid bolus to ensure adequacy of resuscitation  IV antibiotics as appropriate for source of sepsis  Reassessment: I have reassessed the patient's hemodynamic status          Hyperglycemia  Assessment & Plan  Medication noncompliance  Clinically dehydrated  Judicious volume resuscitation  Transition to SQ insulin with high sliding scale   Pt may eat - diabetic diet    NSTEMI (non-ST elevated myocardial infarction) (HCC)  Assessment & Plan  Significantly elevated troponin and BNP  Abnormal ECG, inferior, lateral Q waves with early R wave progression-possible old posterior MI  CTA showed calcified coronary artery disease including left main, proximal LAD, proximal circumflex  Trend troponins  Lovenox x48 hours as well as Plavix and ASA for NSTEMI  Echocardiogram-EF 30-35% with wall motion abnormalities and hypokinetic RV  Cardiology consultation- discussed findings with cardiolog, Dr. Bailey . At this time pt not a candidate for cath lab due to meth use and will be directed to fu with out pt cardiology        VTE:  Lovenox  Ulcer: Not Indicated  Lines: None    I have performed a physical exam and reviewed and updated ROS and Plan today (3/30/2022). In review of yesterday's note (3/29/2022), there are no changes except as documented above.     Discussed patient condition and risk of morbidity and/or mortality with RN, RT, Therapies, Pharmacy,  Dietary, Code status disscussed, Charge nurse / hot rounds, Patient and cardiology  The patient remains critically ill.  Critical care time =55 minutes in directly providing and coordinating critical care and extensive data review.  No time overlap and excludes procedures.

## 2022-03-30 NOTE — DISCHARGE PLANNING
Medical Social Work    Referral: Acute Medical Patient    Intervention: Pt is a 66 year old male brought in by GARCIA from [facility] for SOB and cough.  Pt is Tucker Frederick (: 1955).  [facility]  Pt arrives alert and oriented and able to contact family as needed; no emergency contact on file.      Plan: SW will follow as needed.

## 2022-03-30 NOTE — ED NOTES
Pt back from CT. Pt used urinal with assistance, Increase SOB with exertion and pt desats to 84%. Pt assisted back to bed and coached with deep breathing.

## 2022-03-30 NOTE — PROGRESS NOTES
Received patient from ED and admitted to RICU 117. Transported by RN and CNA on monitor. Patient currently extremely tachypneic, tachycardic and anxious on NRB at 15L. Resident paged and at bedside. Bipap ordered & resp[iratory notified.     Patient states this all started after using meth for the first time in 3 years around 2am. He believes it could have been laced with something that could be causing this. UDS ordered.     Patient states he drinks 10-12 beers a day with his last drink being this morning (3/29) at an unknown time.    0200: Patient appears improved on bipap and much less tachypneic.

## 2022-03-30 NOTE — ASSESSMENT & PLAN NOTE
Medication noncompliance  Clinically dehydrated  Judicious volume resuscitation  Transition to SQ insulin with high sliding scale   Pt may eat - diabetic diet

## 2022-03-30 NOTE — PROGRESS NOTES
Critical Care Medicine Faculty Progress Note    Brief HPI/problem list:  66y M hx of polysubstance abuse with poor med compliance, suffers from alcohol abuse 12-18 beers daily, meth use, DM, that present with SOB mild dka and admitted to ICU for bipap possible pna with inferior Q waves and rising trop.     Daily exam: sitting up eating lunch off bipap, aox4 moves all strong, lungs with tachypnea mild and rales, heart tachycardia, abdomen soft, ext warm no peripheral edema    Daily Multi discipline Rounding Report:  Neuro: intact  HR: 110-120's  SBP: 140-160's  Tmax: afebrile  GI: NPO due to dka protocol, BM pta  UOP: good with lasix  Lines: peripheral IV's  Resp: bipap  12/8 fio2 100% 0-> weaned to n/c  Vte: lovenox rx dose  PPI/H2:n/a  Antibx: day 1 of azithro and C3 procal pending    Plan of care:  Aspirin, statin, cardiology consultation Dr Bailey  Force diuresis  Serial procal  Monitor for alcohol withdrawal  dka protocol transition off insulin gtt -> weight base 30units lantus high sliding scale BMP throughout day to monitor his response and gap  Start diabetic cardiac diet  Wean bipap  Echo with BiV failure EF 35%  BMP at 10 with CO2 22, AG 13, k 3.8, phos 2.3, beta hydroxy 0.29    Patient remains in critical condition from titration of bipap insulin gtt and close monitoring. Critical care time provided was 46 minutes in addition to Dr Prather 90 minutes on same day. This excludes all separate billable procedures.     Please see NP notes for additional documentation    Cipriano Abarca MD  Critical Care Medicine

## 2022-03-30 NOTE — ASSESSMENT & PLAN NOTE
Community-acquired pneumonia, CT findings consistent with COVID-19-negative PCR for COVID-19  Broad-spectrum antibiotics until cultures and sensitivities can guide de-escalation  Supplemental oxygen to maintain oxygen saturations greater than 93%.  Cultures pending

## 2022-03-30 NOTE — ED PROVIDER NOTES
ED Provider Note    Scribed for Kennedy Villatoro by Lissette Gomez. 3/29/2022  8:02 PM    Primary care provider: No primary care provider noted.  Means of arrival: EMS  History obtained from: Patient  History limited by: None    CHIEF COMPLAINT  Chief Complaint   Patient presents with   • Shortness of Breath   • Cough   • Weakness     PPE Note: I personally donned full PPE for all patient encounters during this visit, including wearing an N95 respirator mask, gloves, and eye protection. Scribe remained outside the patient's room and did not have any contact with the patient for the duration of patient encounter.      HPI  Tucker Frederick is a 66 y.o. male who presents to the Emergency Department for shortness of breath onset this morning. He is additionally experiencing a cough, tactile fever, generalized weakness, melena, and dizziness onset today. No alleviating or exacerbating factors identified. Per EMS, his oxygen saturation was 79% on their arrival but desaturated to 72% when placed in an upright position. He improved to 84% with nasal canula so a non-rebreather mask was placed. He denies chest pain, abdominal pain, nausea, and vomiting. He has a history of diabetes and takes metformin. He reports not taking his metformin for the past few days. Per EMS his blood sugar was 527 en route. He states he was feeling normal yesterday. He denies alcohol or drug use. He does not use oxygen at home. He is fully vaccinated against COVID-19.    Quality: shortness of breath  Duration: 12 hours  Severity: severe  Associated sx: cough, tactile fever, generalized weakness, melena, and dizziness     REVIEW OF SYSTEMS  As above, all other systems reviewed and are negative.   See HPI for further details.     PAST MEDICAL HISTORY   has a past medical history of Diabetes (HCC), Hypertension, and Pulmonary edema.    SURGICAL HISTORY  patient denies any surgical history    SOCIAL HISTORY  Social History     Tobacco Use   • Smoking  status: Current Every Day Smoker     Packs/day: 0.50     Types: Cigarettes   • Smokeless tobacco: Never Used   Vaping Use   • Vaping Use: Never used   Substance Use Topics   • Alcohol use: Not Currently   • Drug use: Not Currently      Social History     Substance and Sexual Activity   Drug Use Not Currently     FAMILY HISTORY  History reviewed. No pertinent family history.    CURRENT MEDICATIONS  Metformin    ALLERGIES  No Known Allergies    PHYSICAL EXAM    VITAL SIGNS:   Vitals:    03/29/22 2031 03/29/22 2101 03/29/22 2131 03/29/22 2202   BP: 148/71 139/95 140/72 115/99   Pulse: (!) 122 (!) 120 (!) 122 (!) 126   Resp: (!) 31 (!) 25 (!) 35    Temp:       TempSrc:       SpO2: 95% 95% 94% 93%   Weight:       Height:           Vitals: My interpretation: hypertensive, tachycardic, afebrile, hypoxic    Reinterpretation of vitals: normotensive, tachycardic, afebrile, hypoxic    Cardiac Monitor Interpretation: The cardiac monitor revealed normal sinus tachycardia as interpreted by me. The cardiac monitor was ordered secondary to the patient's history of tachycardia, sepsis and to monitor for dysrhythmia and/or tachycardia.    PE:   Constitutional: Appears ill, coughing, tachypneic  HENT: Normocephalic, Atraumatic, Bilateral external ears normal, Oropharynx is clear mucous membranes are moist. No oral exudates or nasal discharge.   Eyes: Pupils are equal round and reactive, EOMI, Conjunctiva normal, No discharge.   Neck: Normal range of motion, No tenderness, Supple, No stridor. No meningismus.  Lymphatic: No lymphadenopathy noted.   Cardiovascular: Tachycardic, Regular rhythm without murmur rub or gallop.  Thorax & Lungs: Tachypneic, Acute respiratory distress, Clear breath sounds bilaterally without wheezes, rhonchi or rales. There is no chest wall tenderness.   Abdomen: Soft non-tender non-distended. There is no rebound or guarding. No organomegaly is appreciated. Bowel sounds are normal.  Skin: Pale, diaphoretic    Back: No CVA or spinal tenderness.   Extremities: Intact distal pulses, No edema, No tenderness, No cyanosis, No clubbing. Capillary refill is less than 2 seconds.  Musculoskeletal: Good range of motion in all major joints. No tenderness to palpation or major deformities noted.   Neurologic: Alert & oriented x 3, Normal motor function, Normal sensory function, No focal deficits noted. Reflexes are normal.  Psychiatric: Affect normal, Judgment normal, Mood normal. There is no suicidal ideation or patient reported hallucinations.     DIAGNOSTIC STUDIES / PROCEDURES    LABS  Results for orders placed or performed during the hospital encounter of 03/29/22   CBC with Differential   Result Value Ref Range    WBC 15.0 (H) 4.8 - 10.8 K/uL    RBC 4.71 4.70 - 6.10 M/uL    Hemoglobin 14.7 14.0 - 18.0 g/dL    Hematocrit 44.9 42.0 - 52.0 %    MCV 95.3 81.4 - 97.8 fL    MCH 31.2 27.0 - 33.0 pg    MCHC 32.7 (L) 33.7 - 35.3 g/dL    RDW 46.8 35.9 - 50.0 fL    Platelet Count 330 164 - 446 K/uL    MPV 10.1 9.0 - 12.9 fL    Neutrophils-Polys 87.70 (H) 44.00 - 72.00 %    Lymphocytes 3.60 (L) 22.00 - 41.00 %    Monocytes 8.10 0.00 - 13.40 %    Eosinophils 0.00 0.00 - 6.90 %    Basophils 0.10 0.00 - 1.80 %    Immature Granulocytes 0.50 0.00 - 0.90 %    Nucleated RBC 0.00 /100 WBC    Neutrophils (Absolute) 13.18 (H) 1.82 - 7.42 K/uL    Lymphs (Absolute) 0.54 (L) 1.00 - 4.80 K/uL    Monos (Absolute) 1.21 (H) 0.00 - 0.85 K/uL    Eos (Absolute) 0.00 0.00 - 0.51 K/uL    Baso (Absolute) 0.02 0.00 - 0.12 K/uL    Immature Granulocytes (abs) 0.08 0.00 - 0.11 K/uL    NRBC (Absolute) 0.00 K/uL   Comp Metabolic Panel   Result Value Ref Range    Sodium 138 135 - 145 mmol/L    Potassium 5.9 (H) 3.6 - 5.5 mmol/L    Chloride 102 96 - 112 mmol/L    Co2 18 (L) 20 - 33 mmol/L    Anion Gap 18.0 (H) 7.0 - 16.0    Glucose 622 (HH) 65 - 99 mg/dL    Bun 24 (H) 8 - 22 mg/dL    Creatinine 1.45 (H) 0.50 - 1.40 mg/dL    Calcium 8.9 8.5 - 10.5 mg/dL    AST(SGOT) 82  (H) 12 - 45 U/L    ALT(SGPT) 80 (H) 2 - 50 U/L    Alkaline Phosphatase 94 30 - 99 U/L    Total Bilirubin 0.3 0.1 - 1.5 mg/dL    Albumin 4.7 3.2 - 4.9 g/dL    Total Protein 7.5 6.0 - 8.2 g/dL    Globulin 2.8 1.9 - 3.5 g/dL    A-G Ratio 1.7 g/dL   LACTIC ACID   Result Value Ref Range    Lactic Acid 4.3 (HH) 0.5 - 2.0 mmol/L   TROPONIN   Result Value Ref Range    Troponin T 768 (H) 6 - 19 ng/L   proBrain Natriuretic Peptide, NT   Result Value Ref Range    NT-proBNP 8285 (H) 0 - 125 pg/mL   CoV-2, FLU A/B, and RSV by PCR (2-4 Hours CEPHEID) : Collect NP swab in VTM    Specimen: Respirate   Result Value Ref Range    Influenza virus A RNA Negative Negative    Influenza virus B, PCR Negative Negative    RSV, PCR Negative Negative    SARS-CoV-2 by PCR NotDetected     SARS-CoV-2 Source NP Swab    D-DIMER   Result Value Ref Range    D-Dimer Screen 1.42 (H) 0.00 - 0.50 ug/mL (FEU)   Lactic Acid   Result Value Ref Range    Lactic Acid 3.2 (H) 0.5 - 2.0 mmol/L   ESTIMATED GFR   Result Value Ref Range    GFR (CKD-EPI) 53 (A) >60 mL/min/1.73 m 2   VENOUS BLOOD GAS   Result Value Ref Range    Venous Bg Ph 7.26 (L) 7.31 - 7.45    Venous Bg Pco2 46.4 41.0 - 51.0 mmHg    Venous Bg Po2 30.4 25.0 - 40.0 mmHg    Venous Bg O2 Saturation 48.0 %    Venous Bg Hco3 20 (L) 24 - 28 mmol/L    Venous Bg Base Excess -7 mmol/L    Body Temp 98.0 Centigrade   EKG   Result Value Ref Range    Report       [facility] Emergency Dept.    Test Date:  2022  Pt Name:    RAUL RODRIGUEZ                 Department: ER  MRN:        [id number]                      Room:        04  Gender:     Male                         Technician: [id number]  :        1955                   Requested By:ER TRIAGE PROTOCOL  Order #:    [id number]                    Juliette MD: Kennedy Villatoro    Measurements  Intervals                                Axis  Rate:       126                          P:          46  DC:         141                           QRS:        57  QRSD:       112                          T:          77  QT:         324  QTc:        470    Interpretive Statements  Sinus tachycardia  Inferolateral infarct, old  No previous ECG available for comparison  Electronically Signed On 3- 20:10:07 PDT by Kennedy Villatoro     EKG   Result Value Ref Range    Report       [facility] Emergency Dept.    Test Date:  2022  Pt Name:    RAUL RODRIGUEZ                 Department: ER  MRN:        [id number]                      Room:        04  Gender:     Male                         Technician: [id number]  :        1955                   Requested By:ER TRIAGE PROTOCOL  Order #:    [id number]                    Reading MD: Kennedy Villatoro    Measurements  Intervals                                Axis  Rate:       120                          P:          45  GA:         107                          QRS:        66  QRSD:       106                          T:          77  QT:         332  QTc:        470    Interpretive Statements  SINUS TACHYCARDIA  BORDERLINE INTRAVENTRICULAR CONDUCTION DELAY  INFERIOR INFARCT, OLD  Compared to ECG 2022 20:00:11  No significant changes  Electronically Signed On 3- 23:04:02 PDT by Kennedy Villatoro        All labs reviewed by me. Significant for leukocytosis of 15, no anemia, hyperkalemia, hyperglycemia, decreased bicarbonate, LINNEA, transaminitis, lactic acidosis, negative influenza and COVID, elevated D-dimer, elevated lactic acid    RADIOLOGY  CT-CTA CHEST PULMONARY ARTERY W/ RECONS   Final Result         1.  No pulmonary embolus appreciated.   2.  Extensive bilateral pulmonary infiltrates, appearance suggests possible Covid infiltrates.   3.  Small bilateral pleural effusions.   4.  Cardiomegaly   5.  Hepatomegaly and diffuse hepatic steatosis   6.  Atherosclerosis and atherosclerotic coronary artery disease.      DX-CHEST-PORTABLE (1 VIEW)   Final Result      1.  Mild interstitial  pulmonary edema      2.  Enlarged cardiac silhouette        The radiologist's interpretation of all radiological studies have been reviewed by me.    COURSE & MEDICAL DECISION MAKING  Nursing notes, VS, PMSFHx, labs, imaging, EKG reviewed in chart.    9:08 PM - The patient is not a candidate for 30 ml/Kg NS infusion secondary to pulmonary edema.    10:11 PM - Paged Hospitalist.     10:34 PM -  I discussed the patient's case and the above findings with Dr. Schmid (Hospitalist) who recommends admitting to Pulmonary.     10:16 PM - Paged Pulmonary.     10:34 PM -  I discussed the patient's case and the above findings with Dr. Prather (Pulmonary) who agrees to assess the patient for hospitalization.    Heart Score: Moderate    MDM: 8:00 PM Tucker Frederick is a 66 y.o. male who presented with acute onset illness this morning the patient woke up.  He has been out of his diabetes medications for the past few days.  He was found in hypoxic respiratory distress by EMS at 74% on room air and brought here.  He is vaccinated but is against COVID.  He stabilized on nonrebreather oxygen mask.  His work-up concerning for severe lab derangements worrisome for sepsis likely of respiratory origin with a lactic acidosis of 4.3, multiple endorgan damage with elevated troponin of 760, heart failure, LINNEA.  CT chest was done to evaluate for pulmonary embolism as he had chest pain and shortness of breath and showed extensive pulmonary infiltrates suggestive of possible Covid.  Since his illness started this morning it is possible this is viral in nature versus bacterial but he was covered with broad-spectrum antibiotics including ceftriaxone and azithromycin while awaiting a repeat Covid test.  Discussed with the pulmonary critical care intensivist who is amenable to admission.  Patient was given gentle fluid resuscitation as he is not hypotensive and did have initial pulmonary edema on chest x-ray.  EKG does not show any type of ischemia x2,  and is likely demand type II NSTEMI in nature.  He was noted to be hyperkalemic with hyperglycemia and decreased bicarbonate and was treated with 10 units of insulin and IV fluids for this, there is no EKG concerns for widening QRS complex or cardiac complications from his hyperglycemia and hyperkalemia.  Patient critical but stabilized at time of admission.    DISPOSITION:  Patient will be hospitalized by Dr. Prather in guarded condition.    FINAL IMPRESSION  1. Sepsis with acute hypoxic respiratory failure without septic shock, due to unspecified organism ([facility]) Acute   2. Respiratory distress Acute   3. Hypoxia Acute   4. NSTEMI (non-ST elevated myocardial infarction) ([facility]) Acute   5. Hyperglycemia Acute   6. Acute congestive heart failure, unspecified heart failure type ([facility]) Acute   7. LINNEA (acute kidney injury) ([facility]) Acute      CRITICAL CARE TIME 36 minutes  There was a very real possibility of deterioration of the patient's condition.  This patient required the highest level of care.  I provided critical care services which included: review of the medical record, treatment orders, ordering and reviewing test results, frequent reevaluation of the patient's condition and response to treatment, as well as discussing the case with appropriate personnel and various consultants. The critical care time associated with the care of this patient is exclusive of any procedures or specific interventions.     Lissette BERNAL (Lolly), am scribing for, and in the presence of, Kennedy Villatoro.    Electronically signed by: Lissette Gomez (Lolly), 3/29/2022    Kennedy BERNAL personally performed the services described in this documentation, as scribed by Lissette Gomez in my presence, and it is both accurate and complete.    The note accurately reflects work and decisions made by me.  Kennedy Villatoro  3/29/2022  11:01 PM

## 2022-03-31 ENCOUNTER — APPOINTMENT (OUTPATIENT)
Dept: RADIOLOGY | Facility: MEDICAL CENTER | Age: 67
DRG: 871 | End: 2022-03-31
Attending: INTERNAL MEDICINE
Payer: COMMERCIAL

## 2022-03-31 PROBLEM — I50.33 CHF (CONGESTIVE HEART FAILURE), NYHA CLASS I, ACUTE ON CHRONIC, DIASTOLIC (HCC): Status: ACTIVE | Noted: 2022-03-31

## 2022-03-31 PROBLEM — F15.10 METHAMPHETAMINE ABUSE (HCC): Status: ACTIVE | Noted: 2022-03-31

## 2022-03-31 LAB
ACETONE SERPL-MCNC: <5 MG/DL
ANION GAP SERPL CALC-SCNC: 12 MMOL/L (ref 7–16)
BASOPHILS # BLD AUTO: 0.4 % (ref 0–1.8)
BASOPHILS # BLD: 0.05 K/UL (ref 0–0.12)
BUN SERPL-MCNC: 27 MG/DL (ref 8–22)
CALCIUM SERPL-MCNC: 8.4 MG/DL (ref 8.5–10.5)
CHLORIDE SERPL-SCNC: 104 MMOL/L (ref 96–112)
CHOLEST SERPL-MCNC: 159 MG/DL (ref 100–199)
CO2 SERPL-SCNC: 25 MMOL/L (ref 20–33)
CREAT SERPL-MCNC: 0.97 MG/DL (ref 0.5–1.4)
EOSINOPHIL # BLD AUTO: 0.01 K/UL (ref 0–0.51)
EOSINOPHIL NFR BLD: 0.1 % (ref 0–6.9)
ERYTHROCYTE [DISTWIDTH] IN BLOOD BY AUTOMATED COUNT: 48.9 FL (ref 35.9–50)
GFR SERPLBLD CREATININE-BSD FMLA CKD-EPI: 86 ML/MIN/1.73 M 2
GLUCOSE BLD STRIP.AUTO-MCNC: 139 MG/DL (ref 65–99)
GLUCOSE BLD STRIP.AUTO-MCNC: 157 MG/DL (ref 65–99)
GLUCOSE BLD STRIP.AUTO-MCNC: 169 MG/DL (ref 65–99)
GLUCOSE BLD STRIP.AUTO-MCNC: 174 MG/DL (ref 65–99)
GLUCOSE BLD STRIP.AUTO-MCNC: 212 MG/DL (ref 65–99)
GLUCOSE SERPL-MCNC: 153 MG/DL (ref 65–99)
HCT VFR BLD AUTO: 39.6 % (ref 42–52)
HDLC SERPL-MCNC: 59 MG/DL
HGB BLD-MCNC: 12.8 G/DL (ref 14–18)
IMM GRANULOCYTES # BLD AUTO: 0.04 K/UL (ref 0–0.11)
IMM GRANULOCYTES NFR BLD AUTO: 0.3 % (ref 0–0.9)
LDLC SERPL CALC-MCNC: 64 MG/DL
LYMPHOCYTES # BLD AUTO: 1.3 K/UL (ref 1–4.8)
LYMPHOCYTES NFR BLD: 10.6 % (ref 22–41)
MAGNESIUM SERPL-MCNC: 2 MG/DL (ref 1.5–2.5)
MCH RBC QN AUTO: 31.7 PG (ref 27–33)
MCHC RBC AUTO-ENTMCNC: 32.3 G/DL (ref 33.7–35.3)
MCV RBC AUTO: 98 FL (ref 81.4–97.8)
MONOCYTES # BLD AUTO: 1.23 K/UL (ref 0–0.85)
MONOCYTES NFR BLD AUTO: 10 % (ref 0–13.4)
NEUTROPHILS # BLD AUTO: 9.64 K/UL (ref 1.82–7.42)
NEUTROPHILS NFR BLD: 78.6 % (ref 44–72)
NRBC # BLD AUTO: 0 K/UL
NRBC BLD-RTO: 0 /100 WBC
NT-PROBNP SERPL IA-MCNC: 3498 PG/ML (ref 0–125)
PHOSPHATE SERPL-MCNC: 3.3 MG/DL (ref 2.5–4.5)
PLATELET # BLD AUTO: 228 K/UL (ref 164–446)
PMV BLD AUTO: 10.2 FL (ref 9–12.9)
POTASSIUM SERPL-SCNC: 3.8 MMOL/L (ref 3.6–5.5)
RBC # BLD AUTO: 4.04 M/UL (ref 4.7–6.1)
SODIUM SERPL-SCNC: 141 MMOL/L (ref 135–145)
TRIGL SERPL-MCNC: 178 MG/DL (ref 0–149)
WBC # BLD AUTO: 12.3 K/UL (ref 4.8–10.8)

## 2022-03-31 PROCEDURE — 99223 1ST HOSP IP/OBS HIGH 75: CPT | Performed by: HOSPITALIST

## 2022-03-31 PROCEDURE — A9270 NON-COVERED ITEM OR SERVICE: HCPCS | Performed by: STUDENT IN AN ORGANIZED HEALTH CARE EDUCATION/TRAINING PROGRAM

## 2022-03-31 PROCEDURE — 700105 HCHG RX REV CODE 258: Performed by: NURSE PRACTITIONER

## 2022-03-31 PROCEDURE — A9270 NON-COVERED ITEM OR SERVICE: HCPCS | Performed by: NURSE PRACTITIONER

## 2022-03-31 PROCEDURE — A9270 NON-COVERED ITEM OR SERVICE: HCPCS | Performed by: HOSPITALIST

## 2022-03-31 PROCEDURE — 700102 HCHG RX REV CODE 250 W/ 637 OVERRIDE(OP): Performed by: STUDENT IN AN ORGANIZED HEALTH CARE EDUCATION/TRAINING PROGRAM

## 2022-03-31 PROCEDURE — 700102 HCHG RX REV CODE 250 W/ 637 OVERRIDE(OP): Performed by: HOSPITALIST

## 2022-03-31 PROCEDURE — 71045 X-RAY EXAM CHEST 1 VIEW: CPT

## 2022-03-31 PROCEDURE — 80048 BASIC METABOLIC PNL TOTAL CA: CPT

## 2022-03-31 PROCEDURE — 700102 HCHG RX REV CODE 250 W/ 637 OVERRIDE(OP): Performed by: EMERGENCY MEDICINE

## 2022-03-31 PROCEDURE — 700111 HCHG RX REV CODE 636 W/ 250 OVERRIDE (IP): Performed by: INTERNAL MEDICINE

## 2022-03-31 PROCEDURE — 83735 ASSAY OF MAGNESIUM: CPT

## 2022-03-31 PROCEDURE — 83036 HEMOGLOBIN GLYCOSYLATED A1C: CPT

## 2022-03-31 PROCEDURE — A9270 NON-COVERED ITEM OR SERVICE: HCPCS | Performed by: INTERNAL MEDICINE

## 2022-03-31 PROCEDURE — 700102 HCHG RX REV CODE 250 W/ 637 OVERRIDE(OP): Performed by: INTERNAL MEDICINE

## 2022-03-31 PROCEDURE — 99233 SBSQ HOSP IP/OBS HIGH 50: CPT | Performed by: INTERNAL MEDICINE

## 2022-03-31 PROCEDURE — 82962 GLUCOSE BLOOD TEST: CPT | Mod: 91

## 2022-03-31 PROCEDURE — 770020 HCHG ROOM/CARE - TELE (206)

## 2022-03-31 PROCEDURE — 700102 HCHG RX REV CODE 250 W/ 637 OVERRIDE(OP): Performed by: NURSE PRACTITIONER

## 2022-03-31 PROCEDURE — 85025 COMPLETE CBC W/AUTO DIFF WBC: CPT

## 2022-03-31 PROCEDURE — 84100 ASSAY OF PHOSPHORUS: CPT

## 2022-03-31 PROCEDURE — 700111 HCHG RX REV CODE 636 W/ 250 OVERRIDE (IP): Performed by: STUDENT IN AN ORGANIZED HEALTH CARE EDUCATION/TRAINING PROGRAM

## 2022-03-31 PROCEDURE — 700111 HCHG RX REV CODE 636 W/ 250 OVERRIDE (IP): Performed by: NURSE PRACTITIONER

## 2022-03-31 PROCEDURE — A9270 NON-COVERED ITEM OR SERVICE: HCPCS | Performed by: EMERGENCY MEDICINE

## 2022-03-31 PROCEDURE — 80061 LIPID PANEL: CPT

## 2022-03-31 PROCEDURE — 83880 ASSAY OF NATRIURETIC PEPTIDE: CPT

## 2022-03-31 RX ORDER — GAUZE BANDAGE 2" X 2"
100 BANDAGE TOPICAL DAILY
Status: DISCONTINUED | OUTPATIENT
Start: 2022-03-31 | End: 2022-04-03 | Stop reason: HOSPADM

## 2022-03-31 RX ORDER — ATORVASTATIN CALCIUM 80 MG/1
80 TABLET, FILM COATED ORAL EVERY EVENING
Status: DISCONTINUED | OUTPATIENT
Start: 2022-03-31 | End: 2022-04-03 | Stop reason: HOSPADM

## 2022-03-31 RX ORDER — SODIUM CHLORIDE 9 MG/ML
INJECTION, SOLUTION INTRAVENOUS CONTINUOUS
Status: DISCONTINUED | OUTPATIENT
Start: 2022-03-31 | End: 2022-04-01

## 2022-03-31 RX ADMIN — CLOPIDOGREL BISULFATE 75 MG: 75 TABLET ORAL at 05:15

## 2022-03-31 RX ADMIN — INSULIN HUMAN 3 UNITS: 100 INJECTION, SOLUTION PARENTERAL at 09:30

## 2022-03-31 RX ADMIN — ASPIRIN 81 MG: 81 TABLET, COATED ORAL at 05:15

## 2022-03-31 RX ADMIN — Medication 1 LOZENGE: at 14:52

## 2022-03-31 RX ADMIN — DOXYCYCLINE 100 MG: 100 TABLET, FILM COATED ORAL at 17:15

## 2022-03-31 RX ADMIN — INSULIN HUMAN 3 UNITS: 100 INJECTION, SOLUTION PARENTERAL at 16:29

## 2022-03-31 RX ADMIN — CEFTRIAXONE SODIUM 2 G: 10 INJECTION, POWDER, FOR SOLUTION INTRAVENOUS at 16:24

## 2022-03-31 RX ADMIN — INSULIN HUMAN 3 UNITS: 100 INJECTION, SOLUTION PARENTERAL at 12:14

## 2022-03-31 RX ADMIN — SPIRONOLACTONE 12.5 MG: 25 TABLET ORAL at 05:16

## 2022-03-31 RX ADMIN — LOSARTAN POTASSIUM 25 MG: 50 TABLET, FILM COATED ORAL at 17:14

## 2022-03-31 RX ADMIN — ATORVASTATIN CALCIUM 80 MG: 80 TABLET, FILM COATED ORAL at 17:15

## 2022-03-31 RX ADMIN — THIAMINE HCL TAB 100 MG 100 MG: 100 TAB at 14:52

## 2022-03-31 RX ADMIN — ENOXAPARIN SODIUM 80 MG: 80 INJECTION SUBCUTANEOUS at 05:16

## 2022-03-31 RX ADMIN — SODIUM CHLORIDE: 9 INJECTION, SOLUTION INTRAVENOUS at 20:24

## 2022-03-31 RX ADMIN — INSULIN GLARGINE 30 UNITS: 100 INJECTION, SOLUTION SUBCUTANEOUS at 05:24

## 2022-03-31 RX ADMIN — ENOXAPARIN SODIUM 40 MG: 40 INJECTION SUBCUTANEOUS at 17:15

## 2022-03-31 RX ADMIN — BENZONATATE 100 MG: 100 CAPSULE ORAL at 08:36

## 2022-03-31 RX ADMIN — DOXYCYCLINE 100 MG: 100 TABLET, FILM COATED ORAL at 05:15

## 2022-03-31 ASSESSMENT — PATIENT HEALTH QUESTIONNAIRE - PHQ9
1. LITTLE INTEREST OR PLEASURE IN DOING THINGS: NOT AT ALL
SUM OF ALL RESPONSES TO PHQ9 QUESTIONS 1 AND 2: 0
2. FEELING DOWN, DEPRESSED, IRRITABLE, OR HOPELESS: NOT AT ALL

## 2022-03-31 ASSESSMENT — FIBROSIS 4 INDEX: FIB4 SCORE: 2.78

## 2022-03-31 ASSESSMENT — LIFESTYLE VARIABLES
PAROXYSMAL SWEATS: NO SWEAT VISIBLE
TOTAL SCORE: 0
NAUSEA AND VOMITING: NO NAUSEA AND NO VOMITING
TOTAL SCORE: 3
TOTAL SCORE: 3
ANXIETY: NO ANXIETY (AT EASE)
VISUAL DISTURBANCES: NOT PRESENT
PAROXYSMAL SWEATS: NO SWEAT VISIBLE
ALCOHOL_USE: YES
HEADACHE, FULLNESS IN HEAD: NOT PRESENT
HOW MANY TIMES IN THE PAST YEAR HAVE YOU HAD 5 OR MORE DRINKS IN A DAY: 365
AUDITORY DISTURBANCES: NOT PRESENT
TOTAL SCORE: 3
DOES PATIENT WANT TO STOP DRINKING: NO
ORIENTATION AND CLOUDING OF SENSORIUM: ORIENTED AND CAN DO SERIAL ADDITIONS
TREMOR: NO TREMOR
ON A TYPICAL DAY WHEN YOU DRINK ALCOHOL HOW MANY DRINKS DO YOU HAVE: 10
AGITATION: NORMAL ACTIVITY
TREMOR: NO TREMOR
ORIENTATION AND CLOUDING OF SENSORIUM: ORIENTED AND CAN DO SERIAL ADDITIONS
EVER HAD A DRINK FIRST THING IN THE MORNING TO STEADY YOUR NERVES TO GET RID OF A HANGOVER: YES
ANXIETY: NO ANXIETY (AT EASE)
EVER FELT BAD OR GUILTY ABOUT YOUR DRINKING: YES
VISUAL DISTURBANCES: NOT PRESENT
HAVE YOU EVER FELT YOU SHOULD CUT DOWN ON YOUR DRINKING: NO
CONSUMPTION TOTAL: POSITIVE
NAUSEA AND VOMITING: NO NAUSEA AND NO VOMITING
HEADACHE, FULLNESS IN HEAD: NOT PRESENT
AVERAGE NUMBER OF DAYS PER WEEK YOU HAVE A DRINK CONTAINING ALCOHOL: 7
SUBSTANCE_ABUSE: 1
AUDITORY DISTURBANCES: NOT PRESENT
HAVE PEOPLE ANNOYED YOU BY CRITICIZING YOUR DRINKING: YES
TOTAL SCORE: 0
AGITATION: NORMAL ACTIVITY

## 2022-03-31 ASSESSMENT — COGNITIVE AND FUNCTIONAL STATUS - GENERAL
SUGGESTED CMS G CODE MODIFIER MOBILITY: CH
MOBILITY SCORE: 24
DAILY ACTIVITIY SCORE: 24
SUGGESTED CMS G CODE MODIFIER DAILY ACTIVITY: CH

## 2022-03-31 ASSESSMENT — PAIN DESCRIPTION - PAIN TYPE
TYPE: ACUTE PAIN

## 2022-03-31 ASSESSMENT — ENCOUNTER SYMPTOMS
SPUTUM PRODUCTION: 0
DIZZINESS: 0
VOMITING: 0
NAUSEA: 0
ORTHOPNEA: 0
CHILLS: 0
SHORTNESS OF BREATH: 1
HEMOPTYSIS: 0
COUGH: 1
PALPITATIONS: 0

## 2022-03-31 NOTE — PROGRESS NOTES
Cardiology Follow Up Progress Note    Date of Service  3/31/2022    Attending Physician  Silvano Alicia M.D.    Chief Complaint   NSTEMI    HPI  Tucker Frederick is a 66 y.o. male admitted 3/29/2022 with SOB, respiratory distress requiring BiPAP.     Found to have bilateral pleural effusions and a CT consistent with possible Covid.  Treated for pneumonia.  High-sensitivity troponin at 800, 900.  No julienne chest pain.  Breathing improved significantly with BiPAP.     No personal history of cardiac disease.     Has uncontrolled diabetes, A1c 9.3.  Has mixed hyperlipidemia.  Currently using methamphetamines.  Father had an MI at 55.  Cigarette smoker.    Interim Events  Telemetry- Sinus  Off of Bipap  No CP  Does take meds at home      Review of Systems  Review of Systems  No fevers/chills  No nausea/votiming      Vital signs in last 24 hours  Temp:  [35.6 °C (96 °F)-36.7 °C (98 °F)] 35.6 °C (96 °F)  Pulse:  [] 92  Resp:  [17-71] 22  BP: (101-164)/() 117/78  SpO2:  [88 %-99 %] 97 %    Physical Exam    General: No acute distress. Well nourished.  On oxygen  HEENT: EOM grossly intact, no scleral icterus, no pharyngeal erythema.   Neck: Unable to assess JVP due to habitus no bruits, trachea midline  CVS: RRR.  Very distant heart sounds no julienne M/R/G. No julienne LE edema.  2+ radial pulses, 2+ PT pulses  Resp: Mild increased work of breathing, breath sounds difficulty here with BiPAP  Abdomen: Soft, NT, no julienne hepatomegaly.  MSK/Ext: No clubbing or cyanosis.  Skin: Warm and dry, no rashes.  Neurological: CN III-XII grossly intact. No focal deficits.   Psych: A&O x 3, appropriate affect, good judgement    Physical exam performed today and unchanged, except what is noted, compared to 3-30-22        Lab Review  Lab Results   Component Value Date/Time    WBC 12.3 (H) 03/31/2022 05:30 AM    RBC 4.04 (L) 03/31/2022 05:30 AM    HEMOGLOBIN 12.8 (L) 03/31/2022 05:30 AM    HEMATOCRIT 39.6 (L) 03/31/2022 05:30 AM    MCV 98.0  (H) 03/31/2022 05:30 AM    MCH 31.7 03/31/2022 05:30 AM    MCHC 32.3 (L) 03/31/2022 05:30 AM    MPV 10.2 03/31/2022 05:30 AM      Lab Results   Component Value Date/Time    SODIUM 141 03/31/2022 05:30 AM    POTASSIUM 3.8 03/31/2022 05:30 AM    CHLORIDE 104 03/31/2022 05:30 AM    CO2 25 03/31/2022 05:30 AM    GLUCOSE 153 (H) 03/31/2022 05:30 AM    BUN 27 (H) 03/31/2022 05:30 AM    CREATININE 0.97 03/31/2022 05:30 AM      Lab Results   Component Value Date/Time    ASTSGOT 84 (H) 03/30/2022 05:45 AM    ALTSGPT 74 (H) 03/30/2022 05:45 AM     Lab Results   Component Value Date/Time    CHOLSTRLTOT 207 (H) 03/30/2022 05:45 AM     (H) 03/30/2022 05:45 AM    HDL 71 03/30/2022 05:45 AM    TRIGLYCERIDE 177 (H) 03/30/2022 05:45 AM    TROPONINT 943 (H) 03/30/2022 02:35 AM       Recent Labs     03/29/22 2000 03/31/22  0530   NTPROBNP 8285* 3498*       Cardiac Imaging and Procedures Review  EKG:  My personal interpretation of the EKG dated 3-30-22 is sinus tach, NS IVCD, inferior and lateral Q waves, early R wave consistent with possible old posterior MI, baseline artifact     Echocardiogram: 3/30/2022  Normal left ventricular chamber size.  Moderately reduced left ventricular systolic function.  The left ventricular ejection fraction is visually estimated to be 30-  35%.  Global hypokinesis with severe hypokinesis proximal inferrior posterior   wall, .  Probably mildly dilated right venricular with reduced right ventricular   systolic function and hypokinetic right ventricular free wall.  No valvular abnormalities.   No prior study is available for comparison.        Imaging  DX-CHEST-PORTABLE (1 VIEW)   Final Result      1.  Improved interstitial edema.   2.  Cardiomegaly.      EC-ECHOCARDIOGRAM COMPLETE W/ CONT   Final Result      CT-CTA CHEST PULMONARY ARTERY W/ RECONS   Final Result         1.  No pulmonary embolus appreciated.   2.  Extensive bilateral pulmonary infiltrates, appearance suggests possible Covid  infiltrates.   3.  Small bilateral pleural effusions.   4.  Cardiomegaly   5.  Hepatomegaly and diffuse hepatic steatosis   6.  Atherosclerosis and atherosclerotic coronary artery disease.      DX-CHEST-PORTABLE (1 VIEW)   Final Result      1.  Mild interstitial pulmonary edema      2.  Enlarged cardiac silhouette          Assessment/Plan  -Pneumonia   -Acute HFrEF  -Nonstemi  -Abnormal ECG, inferior, lateral q waves early R wave progression with possible old posterior MI  -Ischemic cardiomyopathy  -Methamphetamine use  -Calcified coronary disease seen on CT scan including left main, proximal LAD, proximal circumflex  -Mixed hyperlipidemia  -Uncontrolled diabetes  -Acute hypoxic respiratory failure        Plan:  -Hep gtt vs full lovenox for 48 hours for nonstemi  -Used meth but not often  -Does see his PCP and takes medications regularly if needed  -Agrees he would take dual antiplatelet therapy  -Left heart catheterization tomorrow     Discussed with Abby Jacobs, Dr. Jarvis Arevalo, Dr. Silvano Longoria        Cardiology will continue to follow along.      Thank you for allowing me to participate in the care of this patient.    Please contact me with any questions.    Macarena Bailey M.D.   Cardiologist, [facility]  [phone]

## 2022-03-31 NOTE — CARE PLAN
The patient is Watcher - Medium risk of patient condition declining or worsening      Progress made toward(s) clinical / shift goals:   Problem: Skin Integrity  Goal: Skin integrity is maintained or improved  Outcome: Progressing       Patient is not progressing towards the following goals:      Problem: Respiratory  Goal: Patient will achieve/maintain optimum respiratory ventilation and gas exchange  Outcome: Not Progressing

## 2022-03-31 NOTE — PROGRESS NOTES
Patient arrived on unit via wheelchair, transferred to chair with 1 person assist.    Bedside report given by KEY Sage.  Patient on 5L Oxymask, no complaints of chest pain.

## 2022-03-31 NOTE — ASSESSMENT & PLAN NOTE
Cardiology following  Continue ACS dose Lovenox  Continue aspirin and atorvastatin  Continuous hemodynamic monitoring on telemetry

## 2022-03-31 NOTE — NON-PROVIDER
Mr. Frederick is 67 y/o with PMHx significant for poor medication compliance with T2DM (non-insulin dependent), HTN, HLD, and polysubstance abuse brought in by ambulance to the ED on 3/29 for evaluation of acute onset SOB with accompanying cough, generalized weakness, dizziness, and subjective fever.  EMS found Mr. Frederick to be in acute hypoxic respiratory failure with SpO2 of 72% in high-fowlers position.  Mr. Frederick had asserted not having taken his prescribed medications for several days and had been consuming copious amounts of EtOH (up to 18 beers/day) and ingesting methamphetamines.    Diagnostic evaluation while in ED revealed:  CTA-chest  IMPRESSION:   No pulmonary embolus appreciated; Extensive bilateral pulmonary infiltrates, appearance suggests possible Covid infiltrates; Small bilateral pleural effusions; Cardiomegaly; Hepatomegaly and diffuse hepatic steatosis; Atherosclerosis and atherosclerotic coronary artery disease.  C-xray  IMPRESSION:  Mild interstitial pulmonary edema  Enlarged cardiac silhouette   ECG  New Q waves in the inferior leads compared to 2020    Laboratory studies showed significantly elevated troponin and BNP    Mr. Frederick subsequently admitted to neuro ICU for close monitoring of critical illness related to acute respiratory failure requiring BiPAP and sepsis management secondary to CAP, DKA (hyperglycemia) requiring insulin drip, NSTEMI and decompensation of heart failure necessitating telemetry monitoring and serial troponin evaluations.     Mr. Frederick was assessed and discussed during interdisciplinary rounds this morning.  He was previously transitioned to subcutaneous basal/bolus insulin administration and titrated off of BiPAP.  Mr. Frederick is maintaining sufficient SpO2 on 5L oxygen supplementation via mask and vital signs are stable.  No longer requiring ICU level of care.  Discussed transfer to inpatient telemetry unit with hospitalist, Dr. Alicia.  Transfer accepted.

## 2022-03-31 NOTE — ASSESSMENT & PLAN NOTE
"Alcohol and methamphetamine abuse history  S/p [facility] that reveals 2 vessel disease. Recommended CTSX  Evaluation for CABG  Continue Cozaar, coreg and aldactone   Cardiology following  IV lasix 20 mg daily   Fluid and salt restriction.    Echocardiogram 3/30/2021, results reviewed:  \"Normal left ventricular chamber size.  Moderately reduced left ventricular systolic function.  The left ventricular ejection fraction is visually estimated to be 30-  35%.  Global hypokinesis with severe hypokinesis proximal inferrior posterior   wall, .  Probably mildly dilated right venricular with reduced right ventricular   systolic function and hypokinetic right ventricular free wall.  No valvular abnormalities.   No prior study is available for comparison.\"  "

## 2022-03-31 NOTE — CONSULTS
Hospital Medicine Consultation    Date of Service  3/31/2022    Referring Physician  Cipriano Abarca MD    Consulting Physician  Silvano Alicia M.D.    Reason for Consultation  NSTEMI    History of Presenting Illness  66 y.o. male who presented 3/29/2022 with shortness of breath    I have reviewed some of the recent provider documentation available to me in the patient's medical chart.  Records are briefly summarized: Mr Frederick has past medical history that includes diabetes and hypertension.  Patient reports drinking 12-18 beers per day, urine drug screen was positive for methamphetamine on admission.  He presented to the emergency room on 3/29/2022 with shortness of breath and hyperglycemia.  CT scan of the chest demonstrated diffuse pulmonary infiltrates and small pleural effusions.  Patient was treated for sepsis/pneumonia with fluids and antibiotics.  In addition his EKG was concerning for new Q waves, troponin was elevated and cardiology was consulted.  Patient was admitted to the ICU and treated with BiPAP.    I was consulted by Dr. Abarca to assume care for this patient who can be transferred out of the ICU.  Hospital course, results of labs and imaging, and plan of care were discussed with the critical care provider.    Review of Systems  Review of Systems   Constitutional: Negative for chills and malaise/fatigue.   Respiratory: Positive for cough and shortness of breath. Negative for hemoptysis and sputum production.    Cardiovascular: Negative for chest pain, palpitations and orthopnea.   Gastrointestinal: Negative for nausea and vomiting.   Skin: Negative for itching and rash.   Neurological: Negative for dizziness.   Psychiatric/Behavioral: Positive for substance abuse.   All other systems reviewed and are negative.      Past Medical History   has a past medical history of Diabetes (HCC), Hypertension, and Pulmonary edema.    Surgical History   has no past surgical history on file.    Family  History  family history includes Heart Disease in his father.    Social History   reports that he has been smoking cigarettes. He has been smoking about 0.50 packs per day. He has never used smokeless tobacco. He reports previous alcohol use. He reports previous drug use.    Medications  Current Facility-Administered Medications   Medication Dose Route Frequency Provider Last Rate Last Admin   • menthol ([place]) lozenge 1 Lozenge  1 Lozenge Oral Q2HRS PRN LO Acevedo.P.R.N.       • aspirin EC (ECOTRIN) tablet 81 mg  81 mg Oral DAILY Leonor Christine M.D.   81 mg at 03/31/22 0515   • atorvastatin (LIPITOR) tablet 40 mg  40 mg Oral Q EVENING Leonor Christine M.D.   40 mg at 03/30/22 1706   • cefTRIAXone (Rocephin) syringe 2 g  2 g Intravenous Q24HRS Leonor Christine M.D.   2 g at 03/30/22 1312   • enoxaparin (LOVENOX) inj 80 mg  1 mg/kg Subcutaneous Q12HRS LO Acevedo.P.R.N.   80 mg at 03/31/22 0516   • clopidogrel (PLAVIX) tablet 75 mg  75 mg Oral DAILY LO Acevedo.P.R.N.   75 mg at 03/31/22 0515   • insulin GLARGINE (Lantus,Semglee) injection  30 Units Subcutaneous QAM INSULIN Cipriano Abarca M.D.   30 Units at 03/31/22 0524   • insulin regular (HumuLIN R,NovoLIN R) injection  3-14 Units Subcutaneous 4X/DAY [facility]   3 Units at 03/31/22 1214    And   • dextrose 10 % BOLUS 25 g  25 g Intravenous Q15 MIN PRN Cipriano Abarca M.D.       • doxycycline monohydrate (ADOXA) tablet 100 mg  100 mg Oral Q12HRS LO Acevedo.P.R.N.   100 mg at 03/31/22 0515   • losartan (COZAAR) tablet 25 mg  25 mg Oral Q DAY Macarena Bailey M.D.   25 mg at 03/30/22 1706   • spironolactone (ALDACTONE) tablet 12.5 mg  12.5 mg Oral Q DAY Macarena Bailey M.D.   12.5 mg at 03/31/22 0516   • benzonatate (TESSALON) capsule 100 mg  100 mg Oral TID PRN Kennedy Villatoro   100 mg at 03/31/22 0836       Allergies  No Known Allergies    Physical Exam  Temp:  [35.6 °C (96 °F)-36.8 °C (98.2 °F)] 35.6 °C (96 °F)  Pulse:   [] 92  Resp:  [17-71] 22  BP: (101-164)/() 117/78  SpO2:  [88 %-99 %] 97 %    Physical Exam  Constitutional:       General: He is not in acute distress.     Appearance: Normal appearance. He is normal weight. He is not diaphoretic.   HENT:      Head: Normocephalic and atraumatic.      Right Ear: External ear normal.      Left Ear: External ear normal.      Nose: Nose normal.      Mouth/Throat:      Mouth: Mucous membranes are moist.      Pharynx: Oropharynx is clear.   Eyes:      Extraocular Movements: Extraocular movements intact.      Conjunctiva/sclera: Conjunctivae normal.      Pupils: Pupils are equal, round, and reactive to light.   Cardiovascular:      Rate and Rhythm: Regular rhythm. Tachycardia present.      Pulses: Normal pulses.   Pulmonary:      Effort: Pulmonary effort is normal.      Breath sounds: Rales present.   Abdominal:      General: Abdomen is flat. Bowel sounds are normal. There is no distension.      Palpations: Abdomen is soft.   Musculoskeletal:         General: Normal range of motion.      Cervical back: Normal range of motion and neck supple.      Right lower leg: No edema.      Left lower leg: No edema.   Skin:     General: Skin is warm and dry.      Capillary Refill: Capillary refill takes less than 2 seconds.      Coloration: Skin is not jaundiced.   Neurological:      General: No focal deficit present.      Mental Status: He is alert and oriented to person, place, and time.      Cranial Nerves: No cranial nerve deficit.      Gait: Gait normal.   Psychiatric:         Mood and Affect: Mood normal.         Behavior: Behavior normal.         Fluids      Laboratory  Recent Labs     03/29/22 2000 03/30/22  0545 03/31/22  0530   WBC 15.0* 20.6* 12.3*   RBC 4.71 4.67* 4.04*   HEMOGLOBIN 14.7 14.6 12.8*   HEMATOCRIT 44.9 44.5 39.6*   MCV 95.3 95.3 98.0*   MCH 31.2 31.3 31.7   MCHC 32.7* 32.8* 32.3*   RDW 46.8 47.8 48.9   PLATELETCT 330 298 228   MPV 10.1 10.0 10.2     Recent Labs  "    03/30/22  1032 03/30/22  2200 03/31/22  0530   SODIUM 144 142 141   POTASSIUM 3.8 3.8 3.8   CHLORIDE 109 106 104   CO2 22 23 25   GLUCOSE 126* 236* 153*   BUN 24* 23* 27*   CREATININE 0.95 0.86 0.97   CALCIUM 9.1 7.8* 8.4*     Recent Labs     03/29/22  2000   INR 1.01          Recent Labs     03/30/22  0545   TRIGLYCERIDE 177*   HDL 71   *        Imaging  EC-ECHOCARDIOGRAM COMPLETE W/ CONT   Final Result      CT-CTA CHEST PULMONARY ARTERY W/ RECONS   Final Result         1.  No pulmonary embolus appreciated.   2.  Extensive bilateral pulmonary infiltrates, appearance suggests possible Covid infiltrates.   3.  Small bilateral pleural effusions.   4.  Cardiomegaly   5.  Hepatomegaly and diffuse hepatic steatosis   6.  Atherosclerosis and atherosclerotic coronary artery disease.      DX-CHEST-PORTABLE (1 VIEW)   Final Result      1.  Mild interstitial pulmonary edema      2.  Enlarged cardiac silhouette      DX-CHEST-PORTABLE (1 VIEW)    (Results Pending)       Assessment/Plan  * HFrEF (heart failure with reduced ejection fraction) ([facility])- (present on admission)  Assessment & Plan  Noted substance abuse history  Cardiology following  Continue Cozaar  Cardiology following    Echocardiogram 3/30/2021, results reviewed:  \"Normal left ventricular chamber size.  Moderately reduced left ventricular systolic function.  The left ventricular ejection fraction is visually estimated to be 30-  35%.  Global hypokinesis with severe hypokinesis proximal inferrior posterior   wall, .  Probably mildly dilated right venricular with reduced right ventricular   systolic function and hypokinetic right ventricular free wall.  No valvular abnormalities.   No prior study is available for comparison.\"    NSTEMI (non-ST elevated myocardial infarction) ([facility])- (present on admission)  Assessment & Plan  Cardiology following  Continue ACS dose Lovenox  Continue aspirin and atorvastatin  Continuous hemodynamic monitoring on " telemetry    Pneumonia- (present on admission)  Assessment & Plan  Ceftriaxone and doxycycline    Methamphetamine abuse (HCC)- (present on admission)  Assessment & Plan  Patient reports intermittent use cessation discussed    Alcohol dependency (HCC)- (present on admission)  Assessment & Plan  Watch for withdrawal

## 2022-03-31 NOTE — DOCUMENTATION QUERY
[facility]                                                                       Query Response Note      PATIENT:               RAUL RODRIGUEZ  ACCT #:                  [id number]  MRN:                     [id number]  :                      1955  ADMIT DATE:       3/29/2022 7:59 PM  DISCH DATE:          RESPONDING  PROVIDER #:        [id number]           QUERY TEXT:    Please clarify in documentation the relationship, if any, between acute respiratory failure and sepsis.    NOTE: If an appropriate response is not listed below, please respond with a new note.      The patient's Clinical Indicators include:  Per Critical Care Notes: admitted with shortness of breath.  General: he is in acute distress, speaking in 2-3 word sentences, mild accessory muscle us with prominent SCM use.  Breath sounds: rales present.  Sepsis POA, source is pulmonary   Per Cardiology Consult: acute hypoxic respiratory failure  Vitals Flowsheet:  Admit SpO2 97% on 15L non-rebreather , RR 35; 3/30 0800 SpO2 99% 15L BiPAP, RR 27; 3/31 0800 Spo2 96% on 6L oxymask RR 38  Risk Factors: pneumonia, CHF  Treatment: supplemental O2 via non-rebreather, BiPAP    Thank you,  Lucy Costello RN, BSN  Clinical   Connect via [employer]  Options provided:   -- Acute respiratory failure is due to or associated with sepsis (severe sepsis ruled in)   -- Unrelated to each other   -- Unable to determine      Query created by: Lucy Costello on 3/31/2022 11:34 AM    RESPONSE TEXT:    Unable to determine          Electronically signed by:  ALDO AVENDANO MD 3/31/2022 1:45 PM

## 2022-03-31 NOTE — CARE PLAN
The patient is Watcher - Medium risk of patient condition declining or worsening    Shift Goals  Clinical Goals: reduce O2 requirements  Patient Goals: get off supplemental O2  Family Goals: THOM    Progress made toward(s) clinical / shift goals:    Problem: Knowledge Deficit - Standard  Goal: Patient and family/care givers will demonstrate understanding of plan of care, disease process/condition, diagnostic tests and medications  Outcome: Progressing     Problem: Skin Integrity  Goal: Skin integrity is maintained or improved  Outcome: Progressing

## 2022-04-01 ENCOUNTER — APPOINTMENT (OUTPATIENT)
Dept: CARDIOLOGY | Facility: MEDICAL CENTER | Age: 67
DRG: 871 | End: 2022-04-01
Attending: NURSE PRACTITIONER
Payer: COMMERCIAL

## 2022-04-01 PROBLEM — I25.10 CAD (CORONARY ARTERY DISEASE): Status: ACTIVE | Noted: 2022-04-01

## 2022-04-01 PROBLEM — J96.01 ACUTE RESPIRATORY FAILURE WITH HYPOXIA (HCC): Status: ACTIVE | Noted: 2022-04-01

## 2022-04-01 LAB
ANION GAP SERPL CALC-SCNC: 14 MMOL/L (ref 7–16)
BASOPHILS # BLD AUTO: 0.7 % (ref 0–1.8)
BASOPHILS # BLD: 0.06 K/UL (ref 0–0.12)
BUN SERPL-MCNC: 27 MG/DL (ref 8–22)
CALCIUM SERPL-MCNC: 8.5 MG/DL (ref 8.5–10.5)
CHLORIDE SERPL-SCNC: 103 MMOL/L (ref 96–112)
CO2 SERPL-SCNC: 24 MMOL/L (ref 20–33)
CREAT SERPL-MCNC: 0.8 MG/DL (ref 0.5–1.4)
EOSINOPHIL # BLD AUTO: 0.06 K/UL (ref 0–0.51)
EOSINOPHIL NFR BLD: 0.7 % (ref 0–6.9)
ERYTHROCYTE [DISTWIDTH] IN BLOOD BY AUTOMATED COUNT: 47.1 FL (ref 35.9–50)
EST. AVERAGE GLUCOSE BLD GHB EST-MCNC: 220 MG/DL
GFR SERPLBLD CREATININE-BSD FMLA CKD-EPI: 98 ML/MIN/1.73 M 2
GLUCOSE BLD STRIP.AUTO-MCNC: 132 MG/DL (ref 65–99)
GLUCOSE BLD STRIP.AUTO-MCNC: 173 MG/DL (ref 65–99)
GLUCOSE BLD STRIP.AUTO-MCNC: 85 MG/DL (ref 65–99)
GLUCOSE BLD STRIP.AUTO-MCNC: 88 MG/DL (ref 65–99)
GLUCOSE BLD STRIP.AUTO-MCNC: 92 MG/DL (ref 65–99)
GLUCOSE SERPL-MCNC: 96 MG/DL (ref 65–99)
HBA1C MFR BLD: 9.3 % (ref 4–5.6)
HCT VFR BLD AUTO: 37.6 % (ref 42–52)
HGB BLD-MCNC: 12.3 G/DL (ref 14–18)
IMM GRANULOCYTES # BLD AUTO: 0.04 K/UL (ref 0–0.11)
IMM GRANULOCYTES NFR BLD AUTO: 0.5 % (ref 0–0.9)
LYMPHOCYTES # BLD AUTO: 1.32 K/UL (ref 1–4.8)
LYMPHOCYTES NFR BLD: 16.1 % (ref 22–41)
MCH RBC QN AUTO: 31.5 PG (ref 27–33)
MCHC RBC AUTO-ENTMCNC: 32.7 G/DL (ref 33.7–35.3)
MCV RBC AUTO: 96.4 FL (ref 81.4–97.8)
MONOCYTES # BLD AUTO: 0.85 K/UL (ref 0–0.85)
MONOCYTES NFR BLD AUTO: 10.4 % (ref 0–13.4)
NEUTROPHILS # BLD AUTO: 5.88 K/UL (ref 1.82–7.42)
NEUTROPHILS NFR BLD: 71.6 % (ref 44–72)
NRBC # BLD AUTO: 0 K/UL
NRBC BLD-RTO: 0 /100 WBC
NT-PROBNP SERPL IA-MCNC: 2090 PG/ML (ref 0–125)
PLATELET # BLD AUTO: 229 K/UL (ref 164–446)
PMV BLD AUTO: 10.6 FL (ref 9–12.9)
POTASSIUM SERPL-SCNC: 3.6 MMOL/L (ref 3.6–5.5)
PROCALCITONIN SERPL-MCNC: <0.05 NG/ML
RBC # BLD AUTO: 3.9 M/UL (ref 4.7–6.1)
SODIUM SERPL-SCNC: 141 MMOL/L (ref 135–145)
WBC # BLD AUTO: 8.2 K/UL (ref 4.8–10.8)

## 2022-04-01 PROCEDURE — 99233 SBSQ HOSP IP/OBS HIGH 50: CPT | Performed by: INTERNAL MEDICINE

## 2022-04-01 PROCEDURE — B2111ZZ FLUOROSCOPY OF MULTIPLE CORONARY ARTERIES USING LOW OSMOLAR CONTRAST: ICD-10-PCS | Performed by: INTERNAL MEDICINE

## 2022-04-01 PROCEDURE — 93458 L HRT ARTERY/VENTRICLE ANGIO: CPT | Mod: 26 | Performed by: INTERNAL MEDICINE

## 2022-04-01 PROCEDURE — 82962 GLUCOSE BLOOD TEST: CPT | Mod: 91

## 2022-04-01 PROCEDURE — 4A023N7 MEASUREMENT OF CARDIAC SAMPLING AND PRESSURE, LEFT HEART, PERCUTANEOUS APPROACH: ICD-10-PCS | Performed by: INTERNAL MEDICINE

## 2022-04-01 PROCEDURE — A9270 NON-COVERED ITEM OR SERVICE: HCPCS | Performed by: HOSPITALIST

## 2022-04-01 PROCEDURE — 700102 HCHG RX REV CODE 250 W/ 637 OVERRIDE(OP): Performed by: STUDENT IN AN ORGANIZED HEALTH CARE EDUCATION/TRAINING PROGRAM

## 2022-04-01 PROCEDURE — 85025 COMPLETE CBC W/AUTO DIFF WBC: CPT

## 2022-04-01 PROCEDURE — 700102 HCHG RX REV CODE 250 W/ 637 OVERRIDE(OP): Performed by: HOSPITALIST

## 2022-04-01 PROCEDURE — 99233 SBSQ HOSP IP/OBS HIGH 50: CPT | Mod: 25 | Performed by: INTERNAL MEDICINE

## 2022-04-01 PROCEDURE — 84145 PROCALCITONIN (PCT): CPT

## 2022-04-01 PROCEDURE — 99152 MOD SED SAME PHYS/QHP 5/>YRS: CPT | Performed by: INTERNAL MEDICINE

## 2022-04-01 PROCEDURE — A9270 NON-COVERED ITEM OR SERVICE: HCPCS | Performed by: INTERNAL MEDICINE

## 2022-04-01 PROCEDURE — A9270 NON-COVERED ITEM OR SERVICE: HCPCS | Performed by: NURSE PRACTITIONER

## 2022-04-01 PROCEDURE — 700111 HCHG RX REV CODE 636 W/ 250 OVERRIDE (IP): Performed by: INTERNAL MEDICINE

## 2022-04-01 PROCEDURE — 99223 1ST HOSP IP/OBS HIGH 75: CPT | Performed by: THORACIC SURGERY (CARDIOTHORACIC VASCULAR SURGERY)

## 2022-04-01 PROCEDURE — 700111 HCHG RX REV CODE 636 W/ 250 OVERRIDE (IP): Performed by: STUDENT IN AN ORGANIZED HEALTH CARE EDUCATION/TRAINING PROGRAM

## 2022-04-01 PROCEDURE — 700102 HCHG RX REV CODE 250 W/ 637 OVERRIDE(OP): Performed by: INTERNAL MEDICINE

## 2022-04-01 PROCEDURE — 700102 HCHG RX REV CODE 250 W/ 637 OVERRIDE(OP): Performed by: NURSE PRACTITIONER

## 2022-04-01 PROCEDURE — 83880 ASSAY OF NATRIURETIC PEPTIDE: CPT

## 2022-04-01 PROCEDURE — 700101 HCHG RX REV CODE 250

## 2022-04-01 PROCEDURE — A9270 NON-COVERED ITEM OR SERVICE: HCPCS | Performed by: STUDENT IN AN ORGANIZED HEALTH CARE EDUCATION/TRAINING PROGRAM

## 2022-04-01 PROCEDURE — 99153 MOD SED SAME PHYS/QHP EA: CPT

## 2022-04-01 PROCEDURE — 36415 COLL VENOUS BLD VENIPUNCTURE: CPT

## 2022-04-01 PROCEDURE — 700117 HCHG RX CONTRAST REV CODE 255: Performed by: INTERNAL MEDICINE

## 2022-04-01 PROCEDURE — 770020 HCHG ROOM/CARE - TELE (206)

## 2022-04-01 PROCEDURE — 700111 HCHG RX REV CODE 636 W/ 250 OVERRIDE (IP)

## 2022-04-01 PROCEDURE — 80048 BASIC METABOLIC PNL TOTAL CA: CPT

## 2022-04-01 RX ORDER — CARVEDILOL 6.25 MG/1
6.25 TABLET ORAL 2 TIMES DAILY WITH MEALS
Status: DISCONTINUED | OUTPATIENT
Start: 2022-04-01 | End: 2022-04-03 | Stop reason: HOSPADM

## 2022-04-01 RX ORDER — LIDOCAINE HYDROCHLORIDE 20 MG/ML
INJECTION, SOLUTION EPIDURAL; INFILTRATION; INTRACAUDAL; PERINEURAL
Status: COMPLETED
Start: 2022-04-01 | End: 2022-04-01

## 2022-04-01 RX ORDER — POTASSIUM CHLORIDE 20 MEQ/1
20 TABLET, EXTENDED RELEASE ORAL DAILY
Status: DISCONTINUED | OUTPATIENT
Start: 2022-04-02 | End: 2022-04-03 | Stop reason: HOSPADM

## 2022-04-01 RX ORDER — POTASSIUM CHLORIDE 20 MEQ/1
40 TABLET, EXTENDED RELEASE ORAL ONCE
Status: COMPLETED | OUTPATIENT
Start: 2022-04-01 | End: 2022-04-01

## 2022-04-01 RX ORDER — HEPARIN SODIUM 1000 [USP'U]/ML
INJECTION, SOLUTION INTRAVENOUS; SUBCUTANEOUS
Status: COMPLETED
Start: 2022-04-01 | End: 2022-04-01

## 2022-04-01 RX ORDER — HEPARIN SODIUM 200 [USP'U]/100ML
INJECTION, SOLUTION INTRAVENOUS
Status: COMPLETED
Start: 2022-04-01 | End: 2022-04-01

## 2022-04-01 RX ORDER — MIDAZOLAM HYDROCHLORIDE 1 MG/ML
INJECTION INTRAMUSCULAR; INTRAVENOUS
Status: COMPLETED
Start: 2022-04-01 | End: 2022-04-01

## 2022-04-01 RX ORDER — FUROSEMIDE 10 MG/ML
20 INJECTION INTRAMUSCULAR; INTRAVENOUS
Status: DISCONTINUED | OUTPATIENT
Start: 2022-04-01 | End: 2022-04-02

## 2022-04-01 RX ORDER — VERAPAMIL HYDROCHLORIDE 2.5 MG/ML
INJECTION, SOLUTION INTRAVENOUS
Status: COMPLETED
Start: 2022-04-01 | End: 2022-04-01

## 2022-04-01 RX ADMIN — CEFTRIAXONE SODIUM 2 G: 10 INJECTION, POWDER, FOR SOLUTION INTRAVENOUS at 12:59

## 2022-04-01 RX ADMIN — INSULIN HUMAN 3 UNITS: 100 INJECTION, SOLUTION PARENTERAL at 20:47

## 2022-04-01 RX ADMIN — DOXYCYCLINE 100 MG: 100 TABLET, FILM COATED ORAL at 05:02

## 2022-04-01 RX ADMIN — VERAPAMIL HYDROCHLORIDE 2.5 MG: 2.5 INJECTION, SOLUTION INTRAVENOUS at 09:02

## 2022-04-01 RX ADMIN — SPIRONOLACTONE 12.5 MG: 25 TABLET ORAL at 05:02

## 2022-04-01 RX ADMIN — LOSARTAN POTASSIUM 25 MG: 50 TABLET, FILM COATED ORAL at 17:43

## 2022-04-01 RX ADMIN — ENOXAPARIN SODIUM 40 MG: 40 INJECTION SUBCUTANEOUS at 17:44

## 2022-04-01 RX ADMIN — HEPARIN SODIUM 2000 UNITS: 200 INJECTION, SOLUTION INTRAVENOUS at 09:25

## 2022-04-01 RX ADMIN — ATORVASTATIN CALCIUM 80 MG: 80 TABLET, FILM COATED ORAL at 17:43

## 2022-04-01 RX ADMIN — CLOPIDOGREL BISULFATE 75 MG: 75 TABLET ORAL at 05:02

## 2022-04-01 RX ADMIN — POTASSIUM CHLORIDE 40 MEQ: 20 TABLET, EXTENDED RELEASE ORAL at 15:55

## 2022-04-01 RX ADMIN — FUROSEMIDE 20 MG: 10 INJECTION, SOLUTION INTRAMUSCULAR; INTRAVENOUS at 15:54

## 2022-04-01 RX ADMIN — DOXYCYCLINE 100 MG: 100 TABLET, FILM COATED ORAL at 17:43

## 2022-04-01 RX ADMIN — IOHEXOL 55 ML: 350 INJECTION, SOLUTION INTRAVENOUS at 09:24

## 2022-04-01 RX ADMIN — CARVEDILOL 6.25 MG: 6.25 TABLET, FILM COATED ORAL at 17:44

## 2022-04-01 RX ADMIN — NITROGLYCERIN 10 ML: 20 INJECTION INTRAVENOUS at 09:01

## 2022-04-01 RX ADMIN — THIAMINE HCL TAB 100 MG 100 MG: 100 TAB at 05:02

## 2022-04-01 RX ADMIN — FENTANYL CITRATE 100 MCG: 50 INJECTION, SOLUTION INTRAMUSCULAR; INTRAVENOUS at 08:45

## 2022-04-01 RX ADMIN — ASPIRIN 81 MG: 81 TABLET, COATED ORAL at 05:02

## 2022-04-01 RX ADMIN — HEPARIN SODIUM: 1000 INJECTION, SOLUTION INTRAVENOUS; SUBCUTANEOUS at 09:01

## 2022-04-01 RX ADMIN — LIDOCAINE HYDROCHLORIDE 5 ML: 20 INJECTION, SOLUTION EPIDURAL; INFILTRATION; INTRACAUDAL; PERINEURAL at 09:02

## 2022-04-01 RX ADMIN — MIDAZOLAM HYDROCHLORIDE 2 MG: 1 INJECTION, SOLUTION INTRAMUSCULAR; INTRAVENOUS at 08:45

## 2022-04-01 ASSESSMENT — ENCOUNTER SYMPTOMS
CHILLS: 0
PSYCHIATRIC NEGATIVE: 1
EYES NEGATIVE: 1
GASTROINTESTINAL NEGATIVE: 1
SHORTNESS OF BREATH: 1
NEUROLOGICAL NEGATIVE: 1
WEIGHT LOSS: 0

## 2022-04-01 ASSESSMENT — FIBROSIS 4 INDEX: FIB4 SCORE: 2.77

## 2022-04-01 ASSESSMENT — PAIN DESCRIPTION - PAIN TYPE: TYPE: ACUTE PAIN

## 2022-04-01 NOTE — PROCEDURES
Cardiac Catheterization Laboratory Procedure Note    DATE: 4/1/2022    : Vinh Arevalo MD    PROCEDURES PERFORMED:  1. Left heart catheterization  2. Coronary angiography  3. Moderate conscious sedation    INDICATIONS:  The patient is a 66-year-old gentleman referred for cardiac catheterization to evaluate new onset left ventricular systolic dysfunction.    CONSENT:  The complete alternatives, risks, and benefits of the procedure were explained to the patient.  Signed informed consent was obtained and placed in the chart prior to the procedure.  A timeout was performed prior to beginning the procedure.    MEDICATIONS:  1. Lidocaine  2. Fentanyl  3. Midazolam  4. Nitroglycerin  5. Verapamil  6. Heparin    MODERATE CONSCIOUS SEDATION:  I personally supervised the administration of moderate conscious sedation by the nursing staff for 27 minutes.  Sedation start time 8:57 AM  Sedation end time 9:24 AM    CONTRAST: Omnipaque 55 cc    ACCESS: 6-Mont[place]grin Glidesheath in the right radial artery.    ESTIMATED BLOOD LOSS: 10 cc    COMPLICATIONS: None    DESCRIPTION OF PROCEDURE:  The patient was brought to the cardiac catheterization laboratory in the fasting state.  The skin over the right wrist was prepped and draped in the usual sterile fashion.  Lidocaine infiltration was used to anesthetize the tissue over the right radial artery.  Using the micropuncture technique, a 6-[place] Glidesheath was inserted in the right radial artery.  A 5-[place] Vick catheter was then advanced over a standard into the left ventricular cavity where it was gently aspirated, flushed, and then withdrawn across the aortic valve with sequential pressures measured.  This catheter was used to engage the ostium of the right coronary artery and one cineangiogram was obtained as the vessel was chronically occluded.  This catheter was then used to engage the ostium of the left main coronary artery and cineangiograms were obtained in multiple  projections for complete evaluation of the left coronary system.  Following completion of coronary angiography, all wires, catheters, and sheaths were removed.  A TR band was placed using the patent hemostasis technique.    HEMODYNAMICS:   1. Aortic pressure: 89/57 mmHg  2. Pre A-wave pressure: 20 mmHg  3. No significant aortic gradient on pullback    CORONARY ANGIOGRAPHY:  1. The left main coronary artery is a short, patent vessel that bifurcates into the left anterior descending and left circumflex coronary arteries.  2. The left anterior descending coronary artery is a large, transapical vessel with proximal 20% disease, a long section of mid 30% disease, focal mid-distal 40-50% disease, and distal luminal irregularities.  It supplies a moderate first diagonal branch with mild disease.  3. The left circumflex coronary artery is a large, nondominant vessel with proximal 20% disease followed by mid luminal irregularities.  The ongoing AV groove vessel is small after the bifurcation of a large, occluded second obtuse marginal branch and supplies a small distal third obtuse marginal branch.  The first obtuse marginal branch is a moderate to large vessel with ostial 50% disease followed by luminal irregularities.  The second obtuse marginal branch is a large vessel that is chronically occluded at the ostium and refills by left to left collaterals.  4. The right coronary artery is a moderate, dominant vessel with proximal 50% disease followed by a mid chronic total occlusion.  The distal vessel refills by left to right collaterals and appears to have a large posterior descending coronary artery with luminal irregularities and a small posterolateral branch.    IMPRESSION:  1. Severe obstructive two-vessel coronary artery disease with chronic total occlusions of the mid right coronary artery and the ostial second obtuse marginal branch.  2. Mildly elevated left heart filling pressures.    RECOMMENDATIONS:  1. Return to  floor with continued care per primary service.  2. TR band release per protocol.  3. Evaluation by cardiothoracic surgery for coronary artery bypass grafting and assessment of likelihood that a LIMA-LAD graft will remain patent.  4. Continue optimal medical management of ischemic cardiomyopathy and severe coronary artery disease.

## 2022-04-01 NOTE — PROGRESS NOTES
Began care at 1845, Report received from Jody MACKEY. Patient is SR/ST on monitor, on 5l NC O2, A&Ox4, no pain. Pt educated on NPO status at 0000. Plan of care updated with the patient, no questions at this time. Initial assessment completed, orders reviewed, call light within reach, and fall precautions are in place.

## 2022-04-01 NOTE — CONSULTS
REFERRING PHYSICIAN: Dr Macarena Bailey MD.     CONSULTING PHYSICIAN: Jerome Figueroa DO.    CHIEF COMPLAINT: Shortness of breath    HISTORY OF PRESENT ILLNESS: The patient is a 66 y.o. male with a past medical history of methamphetamine use, diabetes non insulin dependent, hypertension, and alcohol abuse who presented to the ER on 3/29 for worsening shortness of breath.  He was found to have bilateral pleural effusions and pneumonia on CT for which he was treated with antibiotic therapy.  He had elevated troponins in the ER which cardiology consulted on and performed a LHC.      He drinks 12-18 beers daily and admits to methamphetamine use last Monday.      PAST MEDICAL HISTORY:   Past Medical History:   Diagnosis Date   • Diabetes (HCC)    • Hypertension    • Pulmonary edema        PAST SURGICAL HISTORY:   History reviewed. No pertinent surgical history.    FAMILY HISTORY:   Family History   Problem Relation Age of Onset   • Heart Disease Father          of MI at 55        SOCIAL HISTORY:   Social History     Socioeconomic History   • Marital status: Single     Spouse name: Not on file   • Number of children: Not on file   • Years of education: Not on file   • Highest education level: Not on file   Occupational History   • Not on file   Tobacco Use   • Smoking status: Current Every Day Smoker     Packs/day: 0.50     Types: Cigarettes   • Smokeless tobacco: Never Used   Vaping Use   • Vaping Use: Never used   Substance and Sexual Activity   • Alcohol use: Not Currently   • Drug use: Not Currently   • Sexual activity: Not on file   Other Topics Concern   • Not on file   Social History Narrative   • Not on file     Social Determinants of Health     Financial Resource Strain: Not on file   Food Insecurity: Not on file   Transportation Needs: Not on file   Physical Activity: Not on file   Stress: Not on file   Social Connections: Not on file   Intimate Partner Violence: Not on file   Housing Stability: Not on  file       ALLERGIES:   No Known Allergies     CURRENT MEDICATIONS:     Current Facility-Administered Medications:   •  menthol (Halls) lozenge 1 Lozenge, 1 Lozenge, Oral, Q2HRS PRN, Abby Jacobs, A.P.R.N., 1 Lozenge at 03/31/22 1452  •  thiamine (Vitamin B-1) tablet 100 mg, 100 mg, Oral, DAILY, Silvano Alicia M.D., 100 mg at 04/01/22 0502  •  atorvastatin (LIPITOR) tablet 80 mg, 80 mg, Oral, Q EVENING, Macarena Bailey M.D., 80 mg at 03/31/22 1715  •  enoxaparin (LOVENOX) inj 40 mg, 40 mg, Subcutaneous, DAILY, Macarena Bailey M.D., 40 mg at 03/31/22 1715  •  NS infusion, , Intravenous, Continuous, Latrice Zuluaga A.P.N., Last Rate: 50 mL/hr at 03/31/22 2024, New Bag at 03/31/22 2024  •  aspirin EC (ECOTRIN) tablet 81 mg, 81 mg, Oral, DAILY, Leonor Christine M.D., 81 mg at 04/01/22 0502  •  cefTRIAXone (Rocephin) syringe 2 g, 2 g, Intravenous, Q24HRS, Leonor Christine M.D., 2 g at 03/31/22 1624  •  clopidogrel (PLAVIX) tablet 75 mg, 75 mg, Oral, DAILY, Abby Jacobs, A.P.R.N., 75 mg at 04/01/22 0502  •  insulin GLARGINE (Lantus,Semglee) injection, 30 Units, Subcutaneous, QAM INSULIN, Cipriano Abarca M.D., 30 Units at 03/31/22 0524  •  insulin regular (HumuLIN R,NovoLIN R) injection, 3-14 Units, Subcutaneous, 4X/DAY ACHS, 3 Units at 03/31/22 1629 **AND** POC blood glucose manual result, , , Q AC AND BEDTIME(S) **AND** NOTIFY MD and PharmD, , , Once **AND** Administer 20 grams of glucose (approximately 8 ounces of fruit juice) every 15 minutes PRN FSBG less than 70 mg/dL, , , PRN **AND** dextrose 10 % BOLUS 25 g, 25 g, Intravenous, Q15 MIN PRN, Cipriano Abarca M.D.  •  doxycycline monohydrate (ADOXA) tablet 100 mg, 100 mg, Oral, Q12HRS, LO Acevedo.P.R.N., 100 mg at 04/01/22 0502  •  losartan (COZAAR) tablet 25 mg, 25 mg, Oral, Q DAY, Macarena Bailey M.D., 25 mg at 03/31/22 1714  •  spironolactone (ALDACTONE) tablet 12.5 mg, 12.5 mg, Oral, Q DAY, Macarena Bailey M.D., 12.5 mg at 04/01/22 0502  •   benzonatate (TESSALON) capsule 100 mg, 100 mg, Oral, TID PRN, Kennedy Greenwoodaud, 100 mg at 03/31/22 0836     LABS REVIEWED:  Lab Results   Component Value Date/Time    SODIUM 141 04/01/2022 12:59 AM    POTASSIUM 3.6 04/01/2022 12:59 AM    CHLORIDE 103 04/01/2022 12:59 AM    CO2 24 04/01/2022 12:59 AM    GLUCOSE 96 04/01/2022 12:59 AM    BUN 27 (H) 04/01/2022 12:59 AM    CREATININE 0.80 04/01/2022 12:59 AM      Lab Results   Component Value Date/Time    PROTHROMBTM 13.0 03/29/2022 08:00 PM    INR 1.01 03/29/2022 08:00 PM      Lab Results   Component Value Date/Time    WBC 8.2 04/01/2022 12:59 AM    RBC 3.90 (L) 04/01/2022 12:59 AM    HEMOGLOBIN 12.3 (L) 04/01/2022 12:59 AM    HEMATOCRIT 37.6 (L) 04/01/2022 12:59 AM    MCV 96.4 04/01/2022 12:59 AM    MCH 31.5 04/01/2022 12:59 AM    MCHC 32.7 (L) 04/01/2022 12:59 AM    MPV 10.6 04/01/2022 12:59 AM    NEUTSPOLYS 71.60 04/01/2022 12:59 AM    LYMPHOCYTES 16.10 (L) 04/01/2022 12:59 AM    MONOCYTES 10.40 04/01/2022 12:59 AM    EOSINOPHILS 0.70 04/01/2022 12:59 AM    BASOPHILS 0.70 04/01/2022 12:59 AM        IMAGING REVIEWED AND INTERPRETED:    ECHOCARDIOGRAM:   3/30/22 [facility] [place]  Normal left ventricular chamber size.  Moderately reduced left ventricular systolic function.  The left ventricular ejection fraction is visually estimated to be 30-  35%.  Global hypokinesis with severe hypokinesis proximal inferrior posterior   wall, .  Probably mildly dilated right venricular with reduced right ventricular   systolic function and hypokinetic right ventricular free wall.  No valvular abnormalities.   No prior study is available for comparison.     ANGIOGRAM:   4/1/22 [facility]   1. The left main coronary artery is a short, patent vessel that bifurcates into the left anterior descending and left circumflex coronary arteries.  2. The left anterior descending coronary artery is a large, transapical vessel with proximal 20% disease, a long section of mid 30% disease, focal mid-distal 40-50%  disease, and distal luminal irregularities.  It supplies a moderate first diagonal branch with mild disease.  3. The left circumflex coronary artery is a large, nondominant vessel with proximal 20% disease followed by mid luminal irregularities.  The ongoing AV groove vessel is small after the bifurcation of a large, occluded second obtuse marginal branch and supplies a small distal third obtuse marginal branch.  The first obtuse marginal branch is a moderate to large vessel with ostial 50% disease followed by luminal irregularities.  The second obtuse marginal branch is a large vessel that is chronically occluded at the ostium and refills by left to left collaterals.  4. The right coronary artery is a moderate, dominant vessel with proximal 50% disease followed by a mid chronic total occlusion.  The distal vessel refills by left to right collaterals and appears to have a large posterior descending coronary artery with luminal irregularities and a small posterolateral branch.      REVIEW OF SYSTEMS:   Review of Systems   Constitutional: Positive for malaise/fatigue. Negative for chills and weight loss.   HENT: Negative.    Eyes: Negative.    Respiratory: Positive for shortness of breath.    Cardiovascular: Negative for chest pain.   Gastrointestinal: Negative.    Genitourinary: Negative.    Musculoskeletal: Positive for joint pain.   Skin: Negative.    Neurological: Negative.    Endo/Heme/Allergies: Negative.    Psychiatric/Behavioral: Negative.          PHYSICAL EXAMINATION:   Physical Exam  Vitals reviewed.   Constitutional:       General: He is not in acute distress.     Appearance: Normal appearance.   HENT:      Head: Normocephalic and atraumatic.      Right Ear: External ear normal.      Left Ear: External ear normal.   Eyes:      Extraocular Movements: Extraocular movements intact.      Conjunctiva/sclera: Conjunctivae normal.      Pupils: Pupils are equal, round, and reactive to light.   Cardiovascular:       "Rate and Rhythm: Normal rate.      Pulses: Normal pulses.   Pulmonary:      Effort: Pulmonary effort is normal.   Abdominal:      General: Abdomen is flat. There is no distension.      Palpations: Abdomen is soft.   Musculoskeletal:         General: Normal range of motion.      Cervical back: Normal range of motion.   Skin:     General: Skin is warm and dry.      Capillary Refill: Capillary refill takes 2 to 3 seconds.   Neurological:      General: No focal deficit present.      Mental Status: He is alert and oriented to person, place, and time.   Psychiatric:         Mood and Affect: Mood normal.         Behavior: Behavior normal.       /83   Pulse 86   Temp 36.6 °C (97.9 °F) (Temporal)   Resp 16   Ht 1.676 m (5' 6\")   Wt 80 kg (176 lb 5.9 oz)   SpO2 94%   BMI 28.47 kg/m²        IMPRESSION:  Acute on chronic CHF, NSTEMI, Sepsis, pneumonia, polysubstance abuse      PLAN:  I recommend optimal medical therapy for his coronary disease. He does not have a significant stenosis in his LAD, which would lead to graft failure with bypass. His right is well collateralized. His occluded OM branch is also beginning to collateralize. I would not recommend surgery for his CAD. Also, given is substance abuse issues, he is a poor candidate for surgery.     The STS mortality risk score is 1% and the morbidity and mortality risk score is 8.1%. The scores were discussed with patient.      Sincerely,       Jerome Figueroa DO.      IJerome DO performed a substantial portion of the EM visit face-to-face with the same patient on the same date of service with JUVENTINO Townsend. I was personally involved in reviewing and interpreting the films and conducted elements of the history and physical exam. I performed all of the medical decision making for the patient.        "

## 2022-04-01 NOTE — PROGRESS NOTES
Cardiology Follow Up Progress Note    Date of Service  4/1/2022    Attending Physician  Silvano Alicia M.D.    Chief Complaint   NSTEMI    HPI  Tucker Frederick is a 66 y.o. male admitted 3/29/2022 with SOB, respiratory distress requiring BiPAP.     Found to have bilateral pleural effusions and a CT consistent with possible Covid.  Treated for pneumonia.  High-sensitivity troponin at 800, 900.  No julienne chest pain.  Breathing improved significantly with BiPAP.     No personal history of cardiac disease.     Has uncontrolled diabetes, A1c 9.3.  Has mixed hyperlipidemia.  Currently using methamphetamines.  Father had an MI at 55.  Cigarette smoker.    Interim Events  Telemetry- Sinus  Off of Bipap  No CP  Does take meds at home  Left heart catheterization with significant three-vessel disease including  of the RCA and OM and 50% mid LAD      Review of Systems  Review of Systems  No fevers/chills  No nausea/votiming      Vital signs in last 24 hours  Temp:  [35.6 °C (96.1 °F)-36.7 °C (98.1 °F)] 36.6 °C (97.9 °F)  Pulse:  [] 82  Resp:  [2-27] 16  BP: (106-127)/(67-79) 116/74  SpO2:  [91 %-97 %] 91 %    Physical Exam    General: No acute distress. Well nourished.  On oxygen  HEENT: EOM grossly intact, no scleral icterus, no pharyngeal erythema.   Neck: Unable to assess JVP due to habitus no bruits, trachea midline  CVS: RRR.  Very distant heart sounds no julienne M/R/G. No julienne LE edema.  2+ radial pulses, 2+ PT pulses  Resp: Mild increased work of breathing, breath sounds difficulty here with BiPAP  Abdomen: Soft, NT, no julienne hepatomegaly.  MSK/Ext: No clubbing or cyanosis.  Skin: Warm and dry, no rashes.  Neurological: CN III-XII grossly intact. No focal deficits.   Psych: A&O x 3, appropriate affect, good judgement    Physical exam performed today and unchanged, except what is noted, compared to 3-31-22        Lab Review  Lab Results   Component Value Date/Time    WBC 8.2 04/01/2022 12:59 AM    RBC 3.90 (L)  04/01/2022 12:59 AM    HEMOGLOBIN 12.3 (L) 04/01/2022 12:59 AM    HEMATOCRIT 37.6 (L) 04/01/2022 12:59 AM    MCV 96.4 04/01/2022 12:59 AM    MCH 31.5 04/01/2022 12:59 AM    MCHC 32.7 (L) 04/01/2022 12:59 AM    MPV 10.6 04/01/2022 12:59 AM      Lab Results   Component Value Date/Time    SODIUM 141 04/01/2022 12:59 AM    POTASSIUM 3.6 04/01/2022 12:59 AM    CHLORIDE 103 04/01/2022 12:59 AM    CO2 24 04/01/2022 12:59 AM    GLUCOSE 96 04/01/2022 12:59 AM    BUN 27 (H) 04/01/2022 12:59 AM    CREATININE 0.80 04/01/2022 12:59 AM      Lab Results   Component Value Date/Time    ASTSGOT 84 (H) 03/30/2022 05:45 AM    ALTSGPT 74 (H) 03/30/2022 05:45 AM     Lab Results   Component Value Date/Time    CHOLSTRLTOT 159 03/31/2022 05:30 AM    LDL 64 03/31/2022 05:30 AM    HDL 59 03/31/2022 05:30 AM    TRIGLYCERIDE 178 (H) 03/31/2022 05:30 AM    TROPONINT 943 (H) 03/30/2022 02:35 AM       Recent Labs     03/29/22  2000 03/31/22  0530 04/01/22  0059   NTPROBNP 8285* 3498* 2090*       Cardiac Imaging and Procedures Review  EKG:  My personal interpretation of the EKG dated 3-30-22 is sinus tach, NS IVCD, inferior and lateral Q waves, early R wave consistent with possible old posterior MI, baseline artifact     Echocardiogram: 3/30/2022  Normal left ventricular chamber size.  Moderately reduced left ventricular systolic function.  The left ventricular ejection fraction is visually estimated to be 30-  35%.  Global hypokinesis with severe hypokinesis proximal inferrior posterior   wall, .  Probably mildly dilated right venricular with reduced right ventricular   systolic function and hypokinetic right ventricular free wall.  No valvular abnormalities.   No prior study is available for comparison.      [facility] 4-1-22  HEMODYNAMICS:   1. Aortic pressure: 89/57 mmHg  2. Pre A-wave pressure: 20 mmHg  3. No significant aortic gradient on pullback     CORONARY ANGIOGRAPHY:  1. The left main coronary artery is a short, patent vessel that bifurcates  into the left anterior descending and left circumflex coronary arteries.  2. The left anterior descending coronary artery is a large, transapical vessel with proximal 20% disease, a long section of mid 30% disease, focal mid-distal 40-50% disease, and distal luminal irregularities.  It supplies a moderate first diagonal branch with mild disease.  3. The left circumflex coronary artery is a large, nondominant vessel with proximal 20% disease followed by mid luminal irregularities.  The ongoing AV groove vessel is small after the bifurcation of a large, occluded second obtuse marginal branch and supplies a small distal third obtuse marginal branch.  The first obtuse marginal branch is a moderate to large vessel with ostial 50% disease followed by luminal irregularities.  The second obtuse marginal branch is a large vessel that is chronically occluded at the ostium and refills by left to left collaterals.  4. The right coronary artery is a moderate, dominant vessel with proximal 50% disease followed by a mid chronic total occlusion.  The distal vessel refills by left to right collaterals and appears to have a large posterior descending coronary artery with luminal irregularities and a small posterolateral branch.     IMPRESSION:  1. Severe obstructive two-vessel coronary artery disease with chronic total occlusions of the mid right coronary artery and the ostial second obtuse marginal branch.  2. Mildly elevated left heart filling pressures.     RECOMMENDATIONS:  1. Return to floor with continued care per primary service.  2. TR band release per protocol.  3. Evaluation by cardiothoracic surgery for coronary artery bypass grafting and assessment of likelihood that a LIMA-LAD graft will remain patent.  4. Continue optimal medical management of ischemic cardiomyopathy and severe coronary artery disease.      Imaging  DX-CHEST-PORTABLE (1 VIEW)   Final Result      1.  Improved interstitial edema.   2.  Cardiomegaly.       EC-ECHOCARDIOGRAM COMPLETE W/ CONT   Final Result      CT-CTA CHEST PULMONARY ARTERY W/ RECONS   Final Result         1.  No pulmonary embolus appreciated.   2.  Extensive bilateral pulmonary infiltrates, appearance suggests possible Covid infiltrates.   3.  Small bilateral pleural effusions.   4.  Cardiomegaly   5.  Hepatomegaly and diffuse hepatic steatosis   6.  Atherosclerosis and atherosclerotic coronary artery disease.      DX-CHEST-PORTABLE (1 VIEW)   Final Result      1.  Mild interstitial pulmonary edema      2.  Enlarged cardiac silhouette      CL-LEFT HEART CATHETERIZATION WITH POSSIBLE INTERVENTION    (Results Pending)       Assessment/Plan  -Nonstemi  -Severe three-vessel disease including  of the RCA,  of OM, 50% mid LAD, new diagnosis  -Ischemic cardiomyopathy, new diagnosis, EF 30 to 35%  -Acute HFrEF, new  -Methamphetamine use  -Mixed hyperlipidemia  -Uncontrolled diabetes  -Acute hypoxic respiratory failure  -Tobacco use        Plan:  -Medical therapy for CAD  -Medical therapy for LV dysfunction  -Medical therapy to protect the electrical system  -Consideration of coronary bypass versus attempted PCI, revascularization strategy still to be determined  -Initiate beta-blocker now that he is more euvolemic  -Continue diuresis  -Smoking cessation encouraged    Patient's wife at the bedside, updated, all questions answered     Discussed with  Amadeo De La Torre, CT surgery consultation pending        Cardiology will continue to follow along.      Thank you for allowing me to participate in the care of this patient.    Please contact me with any questions.    Macarena Bailey M.D.   Cardiologist, [facility]  [phone]

## 2022-04-01 NOTE — PROGRESS NOTES
Hospital Medicine Daily Progress Note    Date of Service  4/1/2022    Chief Complaint  Tucker Frederick is a 66 y.o. male admitted 3/29/2022 with shortness of breath    Hospital Course  66 yr old male with a PMH of DM, HTN, alcohol abuse, methamphetamine abuse who presented with SOB and was treated for acute CHF exacerbation with bipap and IV diuresis. 2D echo revealed LVEF 35%.     Interval Problem Update  Patient underwent cardiac catheterization that revealed severe obstructive two-vessel coronary artery disease with chronic total occlusions of the mid right coronary artery and the ostial second obtuse marginal branch. CT surgery evaluation for CABG was recommended by Cardiology. Pt denies any active CP. He is currently on 3L of o2. I will start him on IV lasix. Fluid and salt restriction.       I have personally seen and examined the patient at bedside. I discussed the plan of care with patient.    Consultants/Specialty  cardiology    Code Status  Full Code    Disposition  Patient is not medically cleared for discharge.   Anticipate discharge to to home with close outpatient follow-up.  I have placed the appropriate orders for post-discharge needs.    Review of Systems  Review of Systems   Respiratory: Positive for shortness of breath.    All other systems reviewed and are negative.       Physical Exam  Temp:  [36.3 °C (97.3 °F)-36.7 °C (98.1 °F)] 36.6 °C (97.9 °F)  Pulse:  [82-92] 86  Resp:  [16-17] 16  BP: (106-145)/(67-83) 145/83  SpO2:  [91 %-96 %] 94 %    Physical Exam  Vitals and nursing note reviewed.   Constitutional:       General: He is not in acute distress.     Appearance: Normal appearance.   HENT:      Head: Normocephalic and atraumatic.      Nose: Nose normal.      Mouth/Throat:      Mouth: Mucous membranes are moist.   Eyes:      Extraocular Movements: Extraocular movements intact.      Conjunctiva/sclera: Conjunctivae normal.      Pupils: Pupils are equal, round, and reactive to light.    Cardiovascular:      Rate and Rhythm: Normal rate and regular rhythm.      Pulses: Normal pulses.      Heart sounds: Normal heart sounds.   Pulmonary:      Effort: No respiratory distress.      Breath sounds: Rales present. No wheezing or rhonchi.   Abdominal:      General: Bowel sounds are normal. There is no distension.      Palpations: Abdomen is soft.      Tenderness: There is no abdominal tenderness.   Musculoskeletal:         General: No swelling or tenderness. Normal range of motion.      Cervical back: Normal range of motion and neck supple.      Right lower leg: No edema.      Left lower leg: No edema.   Lymphadenopathy:      Cervical: No cervical adenopathy.   Skin:     General: Skin is warm.      Coloration: Skin is not jaundiced.      Findings: No rash.   Neurological:      General: No focal deficit present.      Mental Status: He is alert and oriented to person, place, and time.      Cranial Nerves: No cranial nerve deficit.      Motor: No weakness.   Psychiatric:         Mood and Affect: Mood normal.         Behavior: Behavior normal.         Fluids    Intake/Output Summary (Last 24 hours) at 4/1/2022 1505  Last data filed at 4/1/2022 0600  Gross per 24 hour   Intake --   Output 400 ml   Net -400 ml       Laboratory  Recent Labs     03/30/22  0545 03/31/22  0530 04/01/22  0059   WBC 20.6* 12.3* 8.2   RBC 4.67* 4.04* 3.90*   HEMOGLOBIN 14.6 12.8* 12.3*   HEMATOCRIT 44.5 39.6* 37.6*   MCV 95.3 98.0* 96.4   MCH 31.3 31.7 31.5   MCHC 32.8* 32.3* 32.7*   RDW 47.8 48.9 47.1   PLATELETCT 298 228 229   MPV 10.0 10.2 10.6     Recent Labs     03/30/22  2200 03/31/22  0530 04/01/22  0059   SODIUM 142 141 141   POTASSIUM 3.8 3.8 3.6   CHLORIDE 106 104 103   CO2 23 25 24   GLUCOSE 236* 153* 96   BUN 23* 27* 27*   CREATININE 0.86 0.97 0.80   CALCIUM 7.8* 8.4* 8.5     Recent Labs     03/29/22  2000   INR 1.01         Recent Labs     03/30/22  0545 03/31/22  0530   TRIGLYCERIDE 177* 178*   HDL 71 59   * 64  "      Imaging  DX-CHEST-PORTABLE (1 VIEW)   Final Result      1.  Improved interstitial edema.   2.  Cardiomegaly.      EC-ECHOCARDIOGRAM COMPLETE W/ CONT   Final Result      CT-CTA CHEST PULMONARY ARTERY W/ RECONS   Final Result         1.  No pulmonary embolus appreciated.   2.  Extensive bilateral pulmonary infiltrates, appearance suggests possible Covid infiltrates.   3.  Small bilateral pleural effusions.   4.  Cardiomegaly   5.  Hepatomegaly and diffuse hepatic steatosis   6.  Atherosclerosis and atherosclerotic coronary artery disease.      DX-CHEST-PORTABLE (1 VIEW)   Final Result      1.  Mild interstitial pulmonary edema      2.  Enlarged cardiac silhouette      CL-LEFT HEART CATHETERIZATION WITH POSSIBLE INTERVENTION    (Results Pending)        Assessment/Plan  * HFrEF (heart failure with reduced ejection fraction) (HCC)- (present on admission)  Assessment & Plan  Alcohol and methamphetamine abuse history  S/p LHC that reveals 2 vessel disease. Recommended CTSX  Evaluation for CABG  Continue Cozaar, coreg and aldactone   Cardiology following  IV lasix 20 mg daily   Fluid and salt restriction.    Echocardiogram 3/30/2021, results reviewed:  \"Normal left ventricular chamber size.  Moderately reduced left ventricular systolic function.  The left ventricular ejection fraction is visually estimated to be 30-  35%.  Global hypokinesis with severe hypokinesis proximal inferrior posterior   wall, .  Probably mildly dilated right venricular with reduced right ventricular   systolic function and hypokinetic right ventricular free wall.  No valvular abnormalities.   No prior study is available for comparison.\"    Acute respiratory failure with hypoxia (HCC)- (present on admission)  Assessment & Plan  Currently requiring 3L of O2  Started on IV lasix  RT protocol      CAD (coronary artery disease)  Assessment & Plan  Continue DAPT, coreg and statin    Methamphetamine abuse (HCC)- (present on admission)  Assessment & " Plan  Patient reports intermittent use cessation discussed    Alcohol dependency (HCC)- (present on admission)  Assessment & Plan  Watch for withdrawal    NSTEMI (non-ST elevated myocardial infarction) (HCC)- (present on admission)  Assessment & Plan  Cardiology following  Continue ACS dose Lovenox  Continue aspirin and atorvastatin  Continuous hemodynamic monitoring on telemetry    Pneumonia- (present on admission)  Assessment & Plan  Ceftriaxone and doxycycline       VTE prophylaxis: enoxaparin ppx    I have performed a physical exam and reviewed and updated ROS and Plan today (4/1/2022). In review of yesterday's note (3/31/2022), there are no changes except as documented above.

## 2022-04-01 NOTE — CARE PLAN
The patient is Watcher - Medium risk of patient condition declining or worsening    Shift Goals  Clinical Goals: reduce O2 requirements  Patient Goals: get off supplemental O2  Family Goals: THOM    Progress made toward(s) clinical / shift goals:    Problem: Knowledge Deficit - Standard  Goal: Patient and family/care givers will demonstrate understanding of plan of care, disease process/condition, diagnostic tests and medications  Outcome: Progressing   Pt educated on preprocedure instructions  Problem: Fluid Volume  Goal: Fluid volume balance will be maintained  Outcome: Progressing     Problem: Respiratory  Goal: Patient will achieve/maintain optimum respiratory ventilation and gas exchange  Outcome: Progressing       Patient is not progressing towards the following goals:

## 2022-04-01 NOTE — DISCHARGE PLANNING
Anticipated Discharge Disposition: Anticipate discharge Home      Action: Discussed pt during IDT rounds. Pt has 6 clicks score of 24. Anticipate pt will have no needs at discharge.    Barriers to Discharge: Medical Clearance     Plan: Case management to follow up medical team with any other needs

## 2022-04-01 NOTE — PROGRESS NOTES
Diabetes education: Met with pt this evening. Please see consult note.  Plan: CDE to follow up tomorrow. Please have pt give his insulin with nursing. CDE will teach pens if needed.

## 2022-04-01 NOTE — CARE PLAN
The patient is Watcher - Medium risk of patient condition declining or worsening    Shift Goals  Clinical Goals: reduce O2 requirements  Patient Goals: get off supplemental O2  Family Goals: THOM    Problem: Knowledge Deficit - Standard  Goal: Patient and family/care givers will demonstrate understanding of plan of care, disease process/condition, diagnostic tests and medications  Outcome: Progressing     Problem: Hemodynamics  Goal: Patient's hemodynamics, fluid balance and neurologic status will be stable or improve  Outcome: Progressing     Problem: Fluid Volume  Goal: Fluid volume balance will be maintained  Outcome: Progressing

## 2022-04-01 NOTE — PROGRESS NOTES
Bedside report received. POC discussed with pt; all questions answered at this time. Patient is Alert and oriented and has been NPO. Morning insulin held, FSBG 85.

## 2022-04-01 NOTE — PROGRESS NOTES
4 Eyes Skin Assessment Completed by Jody Barlow, RN and Snow Alatorre, KEY.    Head WDL  Ears WDL  Nose WDL  Mouth WDL  Neck WDL  Breast/Chest WDL  Shoulder Blades WDL  Spine WDL  (R) Arm/Elbow/Hand WDL  (L) Arm/Elbow/Hand WDL  Abdomen WDL  Groin WDL  Scrotum/Coccyx/Buttocks WDL  (R) Leg Scab  (L) Leg Scab  (R) Heel/Foot/Toe WDL  (L) Heel/Foot/Toe WDL          Devices In Places Nasal Cannula      Interventions In Place Pillows, Low Air Loss Mattress and Barrier Cream    Possible Skin Injury No    Pictures Uploaded Into Epic N/A  Wound Consult Placed N/A  RN Wound Prevention Protocol Ordered No

## 2022-04-01 NOTE — PROGRESS NOTES
Patient back from cath lab, TR band on and holding pressure. Patient is alert and oriented, cath site is clean, dry, and intact. Monitor room notified.

## 2022-04-01 NOTE — DIETARY
Brief Nutrition Services Note: RD attempted diet education, pt out of room for procedure. Pt with history of poor medication compliance with T2DM (non-insulin dependent), HTN, HLD, and polysubstance abuse. Notified RN that RD left Diabetic diet handouts at bedside as we were unable to reach him today. Re-consult RD if pt wishes to go over education/handouts together. RD available PRN.

## 2022-04-02 LAB
ANION GAP SERPL CALC-SCNC: 12 MMOL/L (ref 7–16)
BASOPHILS # BLD AUTO: 0.6 % (ref 0–1.8)
BASOPHILS # BLD: 0.03 K/UL (ref 0–0.12)
BUN SERPL-MCNC: 26 MG/DL (ref 8–22)
CALCIUM SERPL-MCNC: 8.5 MG/DL (ref 8.5–10.5)
CHLORIDE SERPL-SCNC: 105 MMOL/L (ref 96–112)
CO2 SERPL-SCNC: 24 MMOL/L (ref 20–33)
CREAT SERPL-MCNC: 0.88 MG/DL (ref 0.5–1.4)
EOSINOPHIL # BLD AUTO: 0.1 K/UL (ref 0–0.51)
EOSINOPHIL NFR BLD: 1.9 % (ref 0–6.9)
ERYTHROCYTE [DISTWIDTH] IN BLOOD BY AUTOMATED COUNT: 46.2 FL (ref 35.9–50)
GFR SERPLBLD CREATININE-BSD FMLA CKD-EPI: 95 ML/MIN/1.73 M 2
GLUCOSE BLD STRIP.AUTO-MCNC: 113 MG/DL (ref 65–99)
GLUCOSE BLD STRIP.AUTO-MCNC: 115 MG/DL (ref 65–99)
GLUCOSE BLD STRIP.AUTO-MCNC: 121 MG/DL (ref 65–99)
GLUCOSE BLD STRIP.AUTO-MCNC: 174 MG/DL (ref 65–99)
GLUCOSE BLD STRIP.AUTO-MCNC: 221 MG/DL (ref 65–99)
GLUCOSE SERPL-MCNC: 100 MG/DL (ref 65–99)
HCT VFR BLD AUTO: 36 % (ref 42–52)
HGB BLD-MCNC: 11.7 G/DL (ref 14–18)
IMM GRANULOCYTES # BLD AUTO: 0.04 K/UL (ref 0–0.11)
IMM GRANULOCYTES NFR BLD AUTO: 0.7 % (ref 0–0.9)
LYMPHOCYTES # BLD AUTO: 1.2 K/UL (ref 1–4.8)
LYMPHOCYTES NFR BLD: 22.3 % (ref 22–41)
MCH RBC QN AUTO: 31.2 PG (ref 27–33)
MCHC RBC AUTO-ENTMCNC: 32.5 G/DL (ref 33.7–35.3)
MCV RBC AUTO: 96 FL (ref 81.4–97.8)
MONOCYTES # BLD AUTO: 0.74 K/UL (ref 0–0.85)
MONOCYTES NFR BLD AUTO: 13.8 % (ref 0–13.4)
NEUTROPHILS # BLD AUTO: 3.27 K/UL (ref 1.82–7.42)
NEUTROPHILS NFR BLD: 60.7 % (ref 44–72)
NRBC # BLD AUTO: 0 K/UL
NRBC BLD-RTO: 0 /100 WBC
NT-PROBNP SERPL IA-MCNC: 1466 PG/ML (ref 0–125)
PLATELET # BLD AUTO: 224 K/UL (ref 164–446)
PMV BLD AUTO: 10.4 FL (ref 9–12.9)
POTASSIUM SERPL-SCNC: 3.8 MMOL/L (ref 3.6–5.5)
RBC # BLD AUTO: 3.75 M/UL (ref 4.7–6.1)
SODIUM SERPL-SCNC: 141 MMOL/L (ref 135–145)
WBC # BLD AUTO: 5.4 K/UL (ref 4.8–10.8)

## 2022-04-02 PROCEDURE — 700102 HCHG RX REV CODE 250 W/ 637 OVERRIDE(OP): Performed by: NURSE PRACTITIONER

## 2022-04-02 PROCEDURE — 99232 SBSQ HOSP IP/OBS MODERATE 35: CPT | Performed by: INTERNAL MEDICINE

## 2022-04-02 PROCEDURE — 99233 SBSQ HOSP IP/OBS HIGH 50: CPT | Performed by: INTERNAL MEDICINE

## 2022-04-02 PROCEDURE — 83880 ASSAY OF NATRIURETIC PEPTIDE: CPT

## 2022-04-02 PROCEDURE — 700102 HCHG RX REV CODE 250 W/ 637 OVERRIDE(OP): Performed by: INTERNAL MEDICINE

## 2022-04-02 PROCEDURE — 36415 COLL VENOUS BLD VENIPUNCTURE: CPT

## 2022-04-02 PROCEDURE — 700101 HCHG RX REV CODE 250: Performed by: STUDENT IN AN ORGANIZED HEALTH CARE EDUCATION/TRAINING PROGRAM

## 2022-04-02 PROCEDURE — 80048 BASIC METABOLIC PNL TOTAL CA: CPT

## 2022-04-02 PROCEDURE — 700111 HCHG RX REV CODE 636 W/ 250 OVERRIDE (IP): Performed by: INTERNAL MEDICINE

## 2022-04-02 PROCEDURE — 700102 HCHG RX REV CODE 250 W/ 637 OVERRIDE(OP): Performed by: HOSPITALIST

## 2022-04-02 PROCEDURE — A9270 NON-COVERED ITEM OR SERVICE: HCPCS | Performed by: HOSPITALIST

## 2022-04-02 PROCEDURE — A9270 NON-COVERED ITEM OR SERVICE: HCPCS | Performed by: STUDENT IN AN ORGANIZED HEALTH CARE EDUCATION/TRAINING PROGRAM

## 2022-04-02 PROCEDURE — 700102 HCHG RX REV CODE 250 W/ 637 OVERRIDE(OP): Performed by: STUDENT IN AN ORGANIZED HEALTH CARE EDUCATION/TRAINING PROGRAM

## 2022-04-02 PROCEDURE — 82962 GLUCOSE BLOOD TEST: CPT | Mod: 91

## 2022-04-02 PROCEDURE — A9270 NON-COVERED ITEM OR SERVICE: HCPCS | Performed by: NURSE PRACTITIONER

## 2022-04-02 PROCEDURE — A9270 NON-COVERED ITEM OR SERVICE: HCPCS | Performed by: INTERNAL MEDICINE

## 2022-04-02 PROCEDURE — 770020 HCHG ROOM/CARE - TELE (206)

## 2022-04-02 PROCEDURE — 700111 HCHG RX REV CODE 636 W/ 250 OVERRIDE (IP): Performed by: STUDENT IN AN ORGANIZED HEALTH CARE EDUCATION/TRAINING PROGRAM

## 2022-04-02 PROCEDURE — 85025 COMPLETE CBC W/AUTO DIFF WBC: CPT

## 2022-04-02 RX ORDER — FUROSEMIDE 40 MG/1
40 TABLET ORAL
Status: DISCONTINUED | OUTPATIENT
Start: 2022-04-02 | End: 2022-04-03 | Stop reason: HOSPADM

## 2022-04-02 RX ADMIN — ASPIRIN 81 MG: 81 TABLET, COATED ORAL at 05:08

## 2022-04-02 RX ADMIN — CARVEDILOL 6.25 MG: 6.25 TABLET, FILM COATED ORAL at 16:48

## 2022-04-02 RX ADMIN — SPIRONOLACTONE 12.5 MG: 25 TABLET ORAL at 05:08

## 2022-04-02 RX ADMIN — INSULIN GLARGINE 30 UNITS: 100 INJECTION, SOLUTION SUBCUTANEOUS at 05:17

## 2022-04-02 RX ADMIN — DOXYCYCLINE 100 MG: 100 TABLET, FILM COATED ORAL at 16:48

## 2022-04-02 RX ADMIN — POTASSIUM CHLORIDE 20 MEQ: 20 TABLET, EXTENDED RELEASE ORAL at 05:08

## 2022-04-02 RX ADMIN — CEFTRIAXONE SODIUM 2 G: 10 INJECTION, POWDER, FOR SOLUTION INTRAVENOUS at 13:22

## 2022-04-02 RX ADMIN — CARVEDILOL 6.25 MG: 6.25 TABLET, FILM COATED ORAL at 08:33

## 2022-04-02 RX ADMIN — INSULIN HUMAN 4 UNITS: 100 INJECTION, SOLUTION PARENTERAL at 21:57

## 2022-04-02 RX ADMIN — DOXYCYCLINE 100 MG: 100 TABLET, FILM COATED ORAL at 05:08

## 2022-04-02 RX ADMIN — CLOPIDOGREL BISULFATE 75 MG: 75 TABLET ORAL at 05:08

## 2022-04-02 RX ADMIN — ENOXAPARIN SODIUM 40 MG: 40 INJECTION SUBCUTANEOUS at 16:48

## 2022-04-02 RX ADMIN — ATORVASTATIN CALCIUM 80 MG: 80 TABLET, FILM COATED ORAL at 16:48

## 2022-04-02 RX ADMIN — THIAMINE HCL TAB 100 MG 100 MG: 100 TAB at 05:08

## 2022-04-02 RX ADMIN — INSULIN HUMAN 3 UNITS: 100 INJECTION, SOLUTION PARENTERAL at 13:11

## 2022-04-02 RX ADMIN — FUROSEMIDE 20 MG: 10 INJECTION, SOLUTION INTRAMUSCULAR; INTRAVENOUS at 05:11

## 2022-04-02 RX ADMIN — LOSARTAN POTASSIUM 25 MG: 50 TABLET, FILM COATED ORAL at 16:48

## 2022-04-02 RX ADMIN — FUROSEMIDE 40 MG: 40 TABLET ORAL at 14:04

## 2022-04-02 ASSESSMENT — FIBROSIS 4 INDEX
FIB4 SCORE: 2.83
FIB4 SCORE: 2.83

## 2022-04-02 ASSESSMENT — PAIN DESCRIPTION - PAIN TYPE: TYPE: ACUTE PAIN

## 2022-04-02 ASSESSMENT — ENCOUNTER SYMPTOMS: SHORTNESS OF BREATH: 1

## 2022-04-02 NOTE — PROGRESS NOTES
Cardiology Follow Up Progress Note    Date of Service  4/2/2022    Attending Physician  Silvano Alicia M.D.    Chief Complaint   NSTEMI    HPI  Tucker Frederick is a 66 y.o. male admitted 3/29/2022 with SOB, respiratory distress requiring BiPAP.     Found to have bilateral pleural effusions and a CT consistent with possible Covid.  Treated for pneumonia.  High-sensitivity troponin at 800, 900.  No julienne chest pain.  Breathing improved significantly with BiPAP.     No personal history of cardiac disease.     Has uncontrolled diabetes, A1c 9.3.  Has mixed hyperlipidemia.  Currently using methamphetamines.  Father had an MI at 55.  Cigarette smoker.    Interim Events  Telemetry- Sinus  Off of Bipap  No CP  Does take meds at home  Left heart catheterization with significant three-vessel disease including  of the RCA and OM and 50% mid LAD, turndown by surgery, will have images reviewed by intervention team      Review of Systems  Review of Systems  No fevers/chills  No nausea/votiming      Vital signs in last 24 hours  Temp:  [36.5 °C (97.7 °F)-36.6 °C (97.9 °F)] 36.5 °C (97.7 °F)  Pulse:  [70-96] 74  Resp:  [16-18] 17  BP: (112-147)/(65-88) 127/78  SpO2:  [90 %-96 %] 93 %    Physical Exam    General: No acute distress. Well nourished.  On oxygen  HEENT: EOM grossly intact, no scleral icterus, no pharyngeal erythema.   Neck: Unable to assess JVP due to habitus no bruits, trachea midline  CVS: RRR.  Very distant heart sounds no julienne M/R/G. No julienne LE edema.  2+ radial pulses, 2+ PT pulses  Resp: Mild increased work of breathing, breath sounds difficulty here with BiPAP  Abdomen: Soft, NT, no julienne hepatomegaly.  MSK/Ext: No clubbing or cyanosis.  Skin: Warm and dry, no rashes.  Neurological: CN III-XII grossly intact. No focal deficits.   Psych: A&O x 3, appropriate affect, good judgement    Physical exam performed today and unchanged, except what is noted, compared to 4/1/2022        Lab Review  Lab Results   Component  Value Date/Time    WBC 5.4 04/02/2022 02:21 AM    RBC 3.75 (L) 04/02/2022 02:21 AM    HEMOGLOBIN 11.7 (L) 04/02/2022 02:21 AM    HEMATOCRIT 36.0 (L) 04/02/2022 02:21 AM    MCV 96.0 04/02/2022 02:21 AM    MCH 31.2 04/02/2022 02:21 AM    MCHC 32.5 (L) 04/02/2022 02:21 AM    MPV 10.4 04/02/2022 02:21 AM      Lab Results   Component Value Date/Time    SODIUM 141 04/02/2022 02:21 AM    POTASSIUM 3.8 04/02/2022 02:21 AM    CHLORIDE 105 04/02/2022 02:21 AM    CO2 24 04/02/2022 02:21 AM    GLUCOSE 100 (H) 04/02/2022 02:21 AM    BUN 26 (H) 04/02/2022 02:21 AM    CREATININE 0.88 04/02/2022 02:21 AM      Lab Results   Component Value Date/Time    ASTSGOT 84 (H) 03/30/2022 05:45 AM    ALTSGPT 74 (H) 03/30/2022 05:45 AM     Lab Results   Component Value Date/Time    CHOLSTRLTOT 159 03/31/2022 05:30 AM    LDL 64 03/31/2022 05:30 AM    HDL 59 03/31/2022 05:30 AM    TRIGLYCERIDE 178 (H) 03/31/2022 05:30 AM    TROPONINT 943 (H) 03/30/2022 02:35 AM       Recent Labs     03/31/22  0530 04/01/22  0059 04/02/22  0221   NTPROBNP 3498* 2090* 1466*       Cardiac Imaging and Procedures Review  EKG:  My personal interpretation of the EKG dated 3-30-22 is sinus tach, NS IVCD, inferior and lateral Q waves, early R wave consistent with possible old posterior MI, baseline artifact     Echocardiogram: 3/30/2022  Normal left ventricular chamber size.  Moderately reduced left ventricular systolic function.  The left ventricular ejection fraction is visually estimated to be 30-  35%.  Global hypokinesis with severe hypokinesis proximal inferrior posterior   wall, .  Probably mildly dilated right venricular with reduced right ventricular   systolic function and hypokinetic right ventricular free wall.  No valvular abnormalities.   No prior study is available for comparison.      [facility] 4-1-22  HEMODYNAMICS:   1. Aortic pressure: 89/57 mmHg  2. Pre A-wave pressure: 20 mmHg  3. No significant aortic gradient on pullback     CORONARY ANGIOGRAPHY:  1. The  left main coronary artery is a short, patent vessel that bifurcates into the left anterior descending and left circumflex coronary arteries.  2. The left anterior descending coronary artery is a large, transapical vessel with proximal 20% disease, a long section of mid 30% disease, focal mid-distal 40-50% disease, and distal luminal irregularities.  It supplies a moderate first diagonal branch with mild disease.  3. The left circumflex coronary artery is a large, nondominant vessel with proximal 20% disease followed by mid luminal irregularities.  The ongoing AV groove vessel is small after the bifurcation of a large, occluded second obtuse marginal branch and supplies a small distal third obtuse marginal branch.  The first obtuse marginal branch is a moderate to large vessel with ostial 50% disease followed by luminal irregularities.  The second obtuse marginal branch is a large vessel that is chronically occluded at the ostium and refills by left to left collaterals.  4. The right coronary artery is a moderate, dominant vessel with proximal 50% disease followed by a mid chronic total occlusion.  The distal vessel refills by left to right collaterals and appears to have a large posterior descending coronary artery with luminal irregularities and a small posterolateral branch.     IMPRESSION:  1. Severe obstructive two-vessel coronary artery disease with chronic total occlusions of the mid right coronary artery and the ostial second obtuse marginal branch.  2. Mildly elevated left heart filling pressures.     RECOMMENDATIONS:  1. Return to floor with continued care per primary service.  2. TR band release per protocol.  3. Evaluation by cardiothoracic surgery for coronary artery bypass grafting and assessment of likelihood that a LIMA-LAD graft will remain patent.  4. Continue optimal medical management of ischemic cardiomyopathy and severe coronary artery disease.      Imaging  DX-CHEST-PORTABLE (1 VIEW)   Final  Result      1.  Improved interstitial edema.   2.  Cardiomegaly.      EC-ECHOCARDIOGRAM COMPLETE W/ CONT   Final Result      CT-CTA CHEST PULMONARY ARTERY W/ RECONS   Final Result         1.  No pulmonary embolus appreciated.   2.  Extensive bilateral pulmonary infiltrates, appearance suggests possible Covid infiltrates.   3.  Small bilateral pleural effusions.   4.  Cardiomegaly   5.  Hepatomegaly and diffuse hepatic steatosis   6.  Atherosclerosis and atherosclerotic coronary artery disease.      DX-CHEST-PORTABLE (1 VIEW)   Final Result      1.  Mild interstitial pulmonary edema      2.  Enlarged cardiac silhouette      CL-LEFT HEART CATHETERIZATION WITH POSSIBLE INTERVENTION    (Results Pending)       Assessment/Plan  -Nonstemi  -Severe three-vessel disease including  of the RCA,  of OM, 50% mid LAD, new diagnosis  -Ischemic cardiomyopathy, new diagnosis, EF 30 to 35%  -Acute HFrEF, new  -Methamphetamine use  -Mixed hyperlipidemia  -Uncontrolled diabetes  -Acute hypoxic respiratory failure  -Tobacco use        Plan:  -Medical therapy for CAD  -Medical therapy for LV dysfunction  -Medical therapy to protect the electrical system  -Consideration of coronary bypass versus attempted PCI, revascularization strategy still to be determined  -Initiate beta-blocker now that he is more euvolemic  -Continue diuresis  -Smoking cessation encouraged  -Try to wean off of oxygen  -We will need close follow-up with BMP within 5 days after discharge to follow-up on kidney function and potassium  -Will be discharged home on furosemide 40 mg equivalent to what he has been getting here, dose can be adjusted as an outpatient    Addendum: Discussed with Dr. Marquise Solorio, interventional cardiologist, plan for medical therapy for now and follow patient clinically, patient will have follow-up with Dr. Jarvis Arevalo to consider  in the future.  In the interim, follow-up echocardiogram in the near future to ensure her EF is above 35%  given risk of VT/sudden cardiac death.     Discussed with  Amadeo De La Torre        Cardiology will continue to follow along.      Thank you for allowing me to participate in the care of this patient.    Please contact me with any questions.    Macarena Bailey M.D.   Cardiologist, [facility]  [phone]

## 2022-04-02 NOTE — PROGRESS NOTES
Hospital Medicine Daily Progress Note    Date of Service  4/2/2022    Chief Complaint  Tucker Frederick is a 66 y.o. male admitted 3/29/2022 with shortness of breath    Hospital Course  66 yr old male with a PMH of DM, HTN, alcohol abuse, methamphetamine abuse who presented with SOB and was treated for acute CHF exacerbation with bipap and IV diuresis. 2D echo revealed LVEF 35%.     Interval Problem Update  Patient underwent cardiac catheterization that revealed severe obstructive two-vessel coronary artery disease with chronic total occlusions of the mid right coronary artery and the ostial second obtuse marginal branch. CT surgery evaluation for CABG was recommended by Cardiology. Pt denies any active CP. He is currently on 3L of o2. I will start him on IV lasix. Fluid and salt restriction.     4/2 patient was deemed not a surgical candidate by CT surgery and was recommended medical management.  Patient remains on 3 L of oxygen by nasal cannula.  He denies any active chest pain.  Continue IV Lasix, Coreg, losartan and Aldactone.  Awaiting cardiology clearance for discharge    I have personally seen and examined the patient at bedside. I discussed the plan of care with patient.    Consultants/Specialty  cardiology    Code Status  Full Code    Disposition  Patient is not medically cleared for discharge.   Anticipate discharge to to home with close outpatient follow-up.  I have placed the appropriate orders for post-discharge needs.    Review of Systems  Review of Systems   Respiratory: Positive for shortness of breath.    All other systems reviewed and are negative.       Physical Exam  Temp:  [36.5 °C (97.7 °F)-36.8 °C (98.2 °F)] 36.8 °C (98.2 °F)  Pulse:  [70-95] 75  Resp:  [16-18] 16  BP: (112-147)/(65-88) 145/86  SpO2:  [90 %-96 %] 91 %    Physical Exam  Vitals and nursing note reviewed.   Constitutional:       General: He is not in acute distress.     Appearance: Normal appearance.   HENT:      Head: Normocephalic  and atraumatic.      Nose: Nose normal.      Mouth/Throat:      Mouth: Mucous membranes are moist.   Eyes:      Extraocular Movements: Extraocular movements intact.      Conjunctiva/sclera: Conjunctivae normal.      Pupils: Pupils are equal, round, and reactive to light.   Cardiovascular:      Rate and Rhythm: Normal rate and regular rhythm.      Pulses: Normal pulses.      Heart sounds: Normal heart sounds.   Pulmonary:      Effort: No respiratory distress.      Breath sounds: Rales present. No wheezing or rhonchi.   Abdominal:      General: Bowel sounds are normal. There is no distension.      Palpations: Abdomen is soft.      Tenderness: There is no abdominal tenderness.   Musculoskeletal:         General: No swelling or tenderness. Normal range of motion.      Cervical back: Normal range of motion and neck supple.      Right lower leg: No edema.      Left lower leg: No edema.   Lymphadenopathy:      Cervical: No cervical adenopathy.   Skin:     General: Skin is warm.      Coloration: Skin is not jaundiced.      Findings: No rash.   Neurological:      General: No focal deficit present.      Mental Status: He is alert and oriented to person, place, and time.      Cranial Nerves: No cranial nerve deficit.      Motor: No weakness.   Psychiatric:         Mood and Affect: Mood normal.         Behavior: Behavior normal.         Fluids    Intake/Output Summary (Last 24 hours) at 4/2/2022 1330  Last data filed at 4/2/2022 0700  Gross per 24 hour   Intake --   Output 1900 ml   Net -1900 ml       Laboratory  Recent Labs     03/31/22  0530 04/01/22  0059 04/02/22  0221   WBC 12.3* 8.2 5.4   RBC 4.04* 3.90* 3.75*   HEMOGLOBIN 12.8* 12.3* 11.7*   HEMATOCRIT 39.6* 37.6* 36.0*   MCV 98.0* 96.4 96.0   MCH 31.7 31.5 31.2   MCHC 32.3* 32.7* 32.5*   RDW 48.9 47.1 46.2   PLATELETCT 228 229 224   MPV 10.2 10.6 10.4     Recent Labs     03/31/22  0530 04/01/22  0059 04/02/22  0221   SODIUM 141 141 141   POTASSIUM 3.8 3.6 3.8   CHLORIDE  "104 103 105   CO2 25 24 24   GLUCOSE 153* 96 100*   BUN 27* 27* 26*   CREATININE 0.97 0.80 0.88   CALCIUM 8.4* 8.5 8.5             Recent Labs     03/31/22  0530   TRIGLYCERIDE 178*   HDL 59   LDL 64       Imaging  DX-CHEST-PORTABLE (1 VIEW)   Final Result      1.  Improved interstitial edema.   2.  Cardiomegaly.      EC-ECHOCARDIOGRAM COMPLETE W/ CONT   Final Result      CT-CTA CHEST PULMONARY ARTERY W/ RECONS   Final Result         1.  No pulmonary embolus appreciated.   2.  Extensive bilateral pulmonary infiltrates, appearance suggests possible Covid infiltrates.   3.  Small bilateral pleural effusions.   4.  Cardiomegaly   5.  Hepatomegaly and diffuse hepatic steatosis   6.  Atherosclerosis and atherosclerotic coronary artery disease.      DX-CHEST-PORTABLE (1 VIEW)   Final Result      1.  Mild interstitial pulmonary edema      2.  Enlarged cardiac silhouette      CL-LEFT HEART CATHETERIZATION WITH POSSIBLE INTERVENTION    (Results Pending)        Assessment/Plan  * HFrEF (heart failure with reduced ejection fraction) (HCC)- (present on admission)  Assessment & Plan  Alcohol and methamphetamine abuse history  S/p LHC that reveals 2 vessel disease. Recommended CTSX  Evaluation for CABG  Continue Cozaar, coreg and aldactone   Cardiology following  IV lasix 20 mg daily   Fluid and salt restriction.    Echocardiogram 3/30/2021, results reviewed:  \"Normal left ventricular chamber size.  Moderately reduced left ventricular systolic function.  The left ventricular ejection fraction is visually estimated to be 30-  35%.  Global hypokinesis with severe hypokinesis proximal inferrior posterior   wall, .  Probably mildly dilated right venricular with reduced right ventricular   systolic function and hypokinetic right ventricular free wall.  No valvular abnormalities.   No prior study is available for comparison.\"    Acute respiratory failure with hypoxia (HCC)- (present on admission)  Assessment & Plan  Currently requiring " 3L of O2  Started on IV lasix  RT protocol      CAD (coronary artery disease)  Assessment & Plan  Continue DAPT, coreg and statin    Methamphetamine abuse (HCC)- (present on admission)  Assessment & Plan  Patient reports intermittent use cessation discussed    Alcohol dependency (HCC)- (present on admission)  Assessment & Plan  Watch for withdrawal    NSTEMI (non-ST elevated myocardial infarction) ([facility])- (present on admission)  Assessment & Plan  Cardiology following  Continue ACS dose Lovenox  Continue aspirin and atorvastatin  Continuous hemodynamic monitoring on telemetry    Pneumonia- (present on admission)  Assessment & Plan  Ceftriaxone and doxycycline       VTE prophylaxis: enoxaparin ppx    I have performed a physical exam and reviewed and updated ROS and Plan today (4/2/2022). In review of yesterday's note (4/1/2022), there are no changes except as documented above.

## 2022-04-02 NOTE — PROGRESS NOTES
Diabetes education: Met with pt briefly this afternoon. Pt had stated his was not taking much insulin. Per MAR, pt is on Glargine 30 units in AM with regular insulin per sliding scale coverage. Blood sugars are 85 ( with glargine held as he was also NPO), 88, 132, and 173 ( 3 units).  Plan: pt was using Jardiance, Actos and Metformin prior to admit, and not on now. Pt was out of Jardiance for about 10 days prior to admit ( maybe other meds as well).  If pt is to go home on Glargine/Lantus before Monday, nursing will need to instruct on drawing up and injecting with vials. Pt has a meter and supplies at home ( [facility], but new meds he will need to get here if [facility] not [facility]). CDE will follow up on Monday if he remains in hospital.

## 2022-04-02 NOTE — CARE PLAN
The patient is Watcher - Medium risk of patient condition declining or worsening    Shift Goals  Clinical Goals: (P) Monitor I&Os, wean O2  Patient Goals: (P) Discharge  Family Goals: (P) N/A    Progress made toward(s) clinical / shift goals:  Patient educated and updated on plan of care    Patient is not progressing towards the following goals:      Problem: Knowledge Deficit - Standard  Goal: Patient and family/care givers will demonstrate understanding of plan of care, disease process/condition, diagnostic tests and medications  4/2/2022 0757 by Marilyn Horton  Outcome: Progressing  Note: Patient updated on plan of care. All questions answered. Patient verbalized understanding. No further questions at this time.    4/2/2022 0757 by Marilyn Horton  Outcome: Progressing     Problem: Hemodynamics  Goal: Patient's hemodynamics, fluid balance and neurologic status will be stable or improve  Outcome: Progressing     Problem: Fluid Volume  Goal: Fluid volume balance will be maintained  4/2/2022 0757 by Marilyn Horton  Outcome: Progressing  Note: Patient aware of diuretic use and monitoring intake and output. Patient aware of measuring output  4/2/2022 0757 by Marilyn Horton  Outcome: Progressing     Problem: Skin Integrity  Goal: Skin integrity is maintained or improved  Outcome: Progressing  Note: Skin assessed. Skin breakdown preventions in place.      Problem: Fall Risk  Goal: Patient will remain free from falls  4/2/2022 0757 by Marilyn Horton  Outcome: Progressing  Note: Patient educated to utilize call light. Patient verbalized understanding. All fall precautions in place. Bed alarm is on.   4/2/2022 0757 by Marilyn Horton  Outcome: Progressing

## 2022-04-02 NOTE — PROGRESS NOTES
Bedside report received from KEY Lei. Patient is alert and oriented x  4, 1 L O2 in use via NC, SR on the monitor. Fall precautions are in place. Call light is in reach.

## 2022-04-02 NOTE — CARE PLAN
The patient is Stable - Low risk of patient condition declining or worsening    Shift Goals  Clinical Goals: monitor hemodynamic stability  Patient Goals: discharge  Family Goals: THOM    Progress made toward(s) clinical / shift goals:    Problem: Knowledge Deficit - Standard  Goal: Patient and family/care givers will demonstrate understanding of plan of care, disease process/condition, diagnostic tests and medications  Outcome: Progressing     Problem: Hemodynamics  Goal: Patient's hemodynamics, fluid balance and neurologic status will be stable or improve  Outcome: Progressing     Problem: Skin Integrity  Goal: Skin integrity is maintained or improved  Outcome: Progressing     Problem: Pain - Standard  Goal: Alleviation of pain or a reduction in pain to the patient’s comfort goal  Outcome: Progressing       Patient is not progressing towards the following goals:

## 2022-04-02 NOTE — PROGRESS NOTES
Assumed care of patient at bedside report from NOC RN. Updated on POC. Patient currently A & O x 4; on 0.5 L O2 nasal cannula; up standby; without complaints of acute pain. Assessment completed Call light within reach. Whiteboard updated. Fall precautions in place. Bed locked and in lowest position. All questions answered. No other needs indicated at this time.

## 2022-04-03 ENCOUNTER — PHARMACY VISIT (OUTPATIENT)
Dept: PHARMACY | Facility: MEDICAL CENTER | Age: 67
End: 2022-04-03
Payer: COMMERCIAL

## 2022-04-03 VITALS
HEIGHT: 66 IN | TEMPERATURE: 98.4 F | SYSTOLIC BLOOD PRESSURE: 137 MMHG | RESPIRATION RATE: 17 BRPM | WEIGHT: 178.79 LBS | BODY MASS INDEX: 28.73 KG/M2 | DIASTOLIC BLOOD PRESSURE: 76 MMHG | OXYGEN SATURATION: 95 % | HEART RATE: 71 BPM

## 2022-04-03 PROBLEM — J96.01 ACUTE RESPIRATORY FAILURE WITH HYPOXIA (HCC): Status: RESOLVED | Noted: 2022-04-01 | Resolved: 2022-04-03

## 2022-04-03 PROBLEM — J18.9 PNEUMONIA: Status: RESOLVED | Noted: 2022-03-29 | Resolved: 2022-04-03

## 2022-04-03 PROBLEM — A41.9 SEPSIS (HCC): Status: RESOLVED | Noted: 2022-03-29 | Resolved: 2022-04-03

## 2022-04-03 PROBLEM — R73.9 HYPERGLYCEMIA: Status: RESOLVED | Noted: 2022-03-29 | Resolved: 2022-04-03

## 2022-04-03 LAB
BACTERIA BLD CULT: NORMAL
BACTERIA BLD CULT: NORMAL
BASOPHILS # BLD AUTO: 0.7 % (ref 0–1.8)
BASOPHILS # BLD: 0.04 K/UL (ref 0–0.12)
EOSINOPHIL # BLD AUTO: 0.12 K/UL (ref 0–0.51)
EOSINOPHIL NFR BLD: 2.1 % (ref 0–6.9)
ERYTHROCYTE [DISTWIDTH] IN BLOOD BY AUTOMATED COUNT: 44.6 FL (ref 35.9–50)
GLUCOSE BLD STRIP.AUTO-MCNC: 130 MG/DL (ref 65–99)
HCT VFR BLD AUTO: 38.7 % (ref 42–52)
HGB BLD-MCNC: 12.7 G/DL (ref 14–18)
IMM GRANULOCYTES # BLD AUTO: 0.05 K/UL (ref 0–0.11)
IMM GRANULOCYTES NFR BLD AUTO: 0.9 % (ref 0–0.9)
LYMPHOCYTES # BLD AUTO: 1.18 K/UL (ref 1–4.8)
LYMPHOCYTES NFR BLD: 20.3 % (ref 22–41)
MCH RBC QN AUTO: 31.1 PG (ref 27–33)
MCHC RBC AUTO-ENTMCNC: 32.8 G/DL (ref 33.7–35.3)
MCV RBC AUTO: 94.9 FL (ref 81.4–97.8)
MONOCYTES # BLD AUTO: 0.78 K/UL (ref 0–0.85)
MONOCYTES NFR BLD AUTO: 13.4 % (ref 0–13.4)
NEUTROPHILS # BLD AUTO: 3.63 K/UL (ref 1.82–7.42)
NEUTROPHILS NFR BLD: 62.6 % (ref 44–72)
NRBC # BLD AUTO: 0 K/UL
NRBC BLD-RTO: 0 /100 WBC
NT-PROBNP SERPL IA-MCNC: 1335 PG/ML (ref 0–125)
PLATELET # BLD AUTO: 285 K/UL (ref 164–446)
PMV BLD AUTO: 10.3 FL (ref 9–12.9)
PROCALCITONIN SERPL-MCNC: <0.05 NG/ML
RBC # BLD AUTO: 4.08 M/UL (ref 4.7–6.1)
SIGNIFICANT IND 70042: NORMAL
SIGNIFICANT IND 70042: NORMAL
SITE SITE: NORMAL
SITE SITE: NORMAL
SOURCE SOURCE: NORMAL
SOURCE SOURCE: NORMAL
WBC # BLD AUTO: 5.8 K/UL (ref 4.8–10.8)

## 2022-04-03 PROCEDURE — 90471 IMMUNIZATION ADMIN: CPT

## 2022-04-03 PROCEDURE — 700102 HCHG RX REV CODE 250 W/ 637 OVERRIDE(OP): Performed by: STUDENT IN AN ORGANIZED HEALTH CARE EDUCATION/TRAINING PROGRAM

## 2022-04-03 PROCEDURE — 700102 HCHG RX REV CODE 250 W/ 637 OVERRIDE(OP): Performed by: INTERNAL MEDICINE

## 2022-04-03 PROCEDURE — 36415 COLL VENOUS BLD VENIPUNCTURE: CPT

## 2022-04-03 PROCEDURE — 84145 PROCALCITONIN (PCT): CPT

## 2022-04-03 PROCEDURE — A9270 NON-COVERED ITEM OR SERVICE: HCPCS | Performed by: INTERNAL MEDICINE

## 2022-04-03 PROCEDURE — 83880 ASSAY OF NATRIURETIC PEPTIDE: CPT

## 2022-04-03 PROCEDURE — 700102 HCHG RX REV CODE 250 W/ 637 OVERRIDE(OP): Performed by: HOSPITALIST

## 2022-04-03 PROCEDURE — A9270 NON-COVERED ITEM OR SERVICE: HCPCS | Performed by: HOSPITALIST

## 2022-04-03 PROCEDURE — 700102 HCHG RX REV CODE 250 W/ 637 OVERRIDE(OP): Performed by: NURSE PRACTITIONER

## 2022-04-03 PROCEDURE — A9270 NON-COVERED ITEM OR SERVICE: HCPCS | Performed by: STUDENT IN AN ORGANIZED HEALTH CARE EDUCATION/TRAINING PROGRAM

## 2022-04-03 PROCEDURE — 90677 PCV20 VACCINE IM: CPT | Performed by: INTERNAL MEDICINE

## 2022-04-03 PROCEDURE — 85025 COMPLETE CBC W/AUTO DIFF WBC: CPT

## 2022-04-03 PROCEDURE — 82962 GLUCOSE BLOOD TEST: CPT

## 2022-04-03 PROCEDURE — 700111 HCHG RX REV CODE 636 W/ 250 OVERRIDE (IP): Performed by: INTERNAL MEDICINE

## 2022-04-03 PROCEDURE — RXMED WILLOW AMBULATORY MEDICATION CHARGE: Performed by: INTERNAL MEDICINE

## 2022-04-03 PROCEDURE — 99239 HOSP IP/OBS DSCHRG MGMT >30: CPT | Performed by: INTERNAL MEDICINE

## 2022-04-03 PROCEDURE — A9270 NON-COVERED ITEM OR SERVICE: HCPCS | Performed by: NURSE PRACTITIONER

## 2022-04-03 RX ORDER — ATORVASTATIN CALCIUM 80 MG/1
80 TABLET, FILM COATED ORAL EVERY EVENING
Qty: 30 TABLET | Refills: 0 | Status: SHIPPED | OUTPATIENT
Start: 2022-04-03 | End: 2023-03-03 | Stop reason: SDUPTHER

## 2022-04-03 RX ORDER — FUROSEMIDE 40 MG/1
40 TABLET ORAL DAILY
Qty: 30 TABLET | Refills: 0 | Status: SHIPPED | OUTPATIENT
Start: 2022-04-04 | End: 2022-06-01

## 2022-04-03 RX ORDER — CARVEDILOL 6.25 MG/1
6.25 TABLET ORAL 2 TIMES DAILY WITH MEALS
Qty: 60 TABLET | Refills: 0 | Status: SHIPPED | OUTPATIENT
Start: 2022-04-03 | End: 2022-05-12 | Stop reason: SDUPTHER

## 2022-04-03 RX ORDER — ASPIRIN 81 MG/1
81 TABLET ORAL DAILY
Qty: 30 TABLET | Refills: 0 | Status: SHIPPED | OUTPATIENT
Start: 2022-04-04 | End: 2022-06-01

## 2022-04-03 RX ORDER — SPIRONOLACTONE 25 MG/1
12.5 TABLET ORAL DAILY
Qty: 30 TABLET | Refills: 3 | Status: SHIPPED | OUTPATIENT
Start: 2022-04-04 | End: 2022-06-01

## 2022-04-03 RX ORDER — CLOPIDOGREL BISULFATE 75 MG/1
75 TABLET ORAL DAILY
Qty: 30 TABLET | Refills: 0 | Status: SHIPPED | OUTPATIENT
Start: 2022-04-04 | End: 2022-06-01 | Stop reason: SDUPTHER

## 2022-04-03 RX ADMIN — THIAMINE HCL TAB 100 MG 100 MG: 100 TAB at 06:18

## 2022-04-03 RX ADMIN — ASPIRIN 81 MG: 81 TABLET, COATED ORAL at 06:19

## 2022-04-03 RX ADMIN — CLOPIDOGREL BISULFATE 75 MG: 75 TABLET ORAL at 06:18

## 2022-04-03 RX ADMIN — INSULIN GLARGINE 30 UNITS: 100 INJECTION, SOLUTION SUBCUTANEOUS at 06:21

## 2022-04-03 RX ADMIN — DOXYCYCLINE 100 MG: 100 TABLET, FILM COATED ORAL at 06:19

## 2022-04-03 RX ADMIN — PNEUMOCOCCAL 20-VALENT CONJUGATE VACCINE 0.5 ML
2.2; 2.2; 2.2; 2.2; 2.2; 2.2; 2.2; 2.2; 2.2; 2.2; 2.2; 2.2; 2.2; 2.2; 2.2; 2.2; 4.4; 2.2; 2.2; 2.2 INJECTION, SUSPENSION INTRAMUSCULAR at 11:39

## 2022-04-03 RX ADMIN — POTASSIUM CHLORIDE 20 MEQ: 20 TABLET, EXTENDED RELEASE ORAL at 06:18

## 2022-04-03 RX ADMIN — SPIRONOLACTONE 12.5 MG: 25 TABLET ORAL at 06:18

## 2022-04-03 RX ADMIN — FUROSEMIDE 40 MG: 40 TABLET ORAL at 06:19

## 2022-04-03 RX ADMIN — CARVEDILOL 6.25 MG: 6.25 TABLET, FILM COATED ORAL at 09:16

## 2022-04-03 ASSESSMENT — FIBROSIS 4 INDEX: FIB4 SCORE: 2.23

## 2022-04-03 ASSESSMENT — PAIN DESCRIPTION - PAIN TYPE: TYPE: ACUTE PAIN

## 2022-04-03 NOTE — PROGRESS NOTES
Assumed care of patient at bedside report from NOC RN. Updated on POC. Patient currently A & O x 4; on Room air; up self; without complaints of acute pain. Assessment completed Call light within reach. Whiteboard updated. Fall precautions in place. Bed locked and in lowest position. All questions answered. No other needs indicated at this time.

## 2022-04-03 NOTE — CARE PLAN
Problem: Care Map:  Day of Discharge Optimal Outcome for the Heart Failure Patient  Goal: Day of Discharge:  Optimal Care of the heart failure patient  4/3/2022 1224 by Rebeca Tristan R.N.  Outcome: Met  4/3/2022 1223 by Rebeca Tristan R.N.  Outcome: Progressing   The patient is Stable - Low risk of patient condition declining or worsening    Shift Goals  Clinical Goals: I&Os  Patient Goals: Home  Family Goals: N/A    Progress made toward(s) clinical / shift goals:  discharge ordered    Patient is not progressing towards the following goals:

## 2022-04-03 NOTE — CARE PLAN
The patient is Stable - Low risk of patient condition declining or worsening    Shift Goals  Clinical Goals: I& Os, tele monitor, promote sleep  Patient Goals: walk around unit  Family Goals: N/A    Progress made toward(s) clinical / shift goals:    Problem: Hemodynamics  Goal: Patient's hemodynamics, fluid balance and neurologic status will be stable or improve  Outcome: Progressing     Problem: Respiratory  Goal: Patient will achieve/maintain optimum respiratory ventilation and gas exchange  Outcome: Progressing       Patient is not progressing towards the following goals:

## 2022-04-03 NOTE — DISCHARGE INSTRUCTIONS
Discharge Instructions    Discharged to home by car with friend. Discharged via walking, hospital escort: Yes.  Special equipment needed: Not Applicable    Be sure to schedule a follow-up appointment with your primary care doctor or any specialists as instructed.     Discharge Plan:   Diet Plan: Discussed  Activity Level: Discussed  Confirmed Follow up Appointment: Appointment Scheduled  Confirmed Symptoms Management: Discussed  Medication Reconciliation Updated: Yes  Influenza Vaccine Indication: Patient Refuses    I understand that a diet low in cholesterol, fat, and sodium is recommended for good health. Unless I have been given specific instructions below for another diet, I accept this instruction as my diet prescription.   Other diet: Heart Healthy, Diabetic    Special Instructions:   HF Patient Discharge Instructions  · Monitor your weight daily, and maintain a weight chart, to track your weight changes.   · Activity as tolerated, unless your Doctor has ordered otherwise. Other activity order: as tolerated.  · Follow a low fat, low cholesterol, low salt diet unless instructed otherwise by your Doctor. Read the labels on the back of food products and track your intake of fat, cholesterol and salt.   · Fluid Restriction Yes. If a Fluid Restriction has been ordered by your Doctor, measure fluids with a measuring cup to ensure that you are not exceeding the restriction.   · No smoking.  · Oxygen No. If your Doctor has ordered that you wear Oxygen at home, it is important to wear it as ordered.  · Did you receive an explanation from staff on the importance of taking each of your medications and why it is necessary to keep taking them unless your doctor says to stop? Yes  · Were all of your questions answered about how to manage your heart failure and what to do if you have increased signs and symptoms after you go home? Yes  · Do you feel like your heart failure care team involved you in the care treatment plan  and allowed you to make decisions regarding your care while in the hospital and addressed any discharge needs you might have? Yes    See the educational handout provided at discharge for more information on monitoring your daily weight, activity and diet. This also explains more about Heart Failure, symptoms of a flare-up and some of the tests that you have undergone.     Warning Signs of a Flare-Up include:  · Swelling in the ankles or lower legs.  · Shortness of breath, while at rest, or while doing normal activities.   · Shortness of breath at night when in bed, or coughing in bed.   · Requiring more pillows to sleep at night, or needing to sit up at night to sleep.  · Feeling weak, dizzy or fatigued.     When to call your Doctor:  · Call [facility] seven days a week from 8:00 a.m. to 8:00 p.m. for medical questions [phone].  · Call your Primary Care Physician or Cardiologist if:   1. You experience any pain radiating to your jaw or neck.  2. You have any difficulty breathing.  3. You experience weight gain of 3 lbs in a day or 5 lbs in a week.   4. You feel any palpitations or irregular heartbeats.  5. You become dizzy or lose consciousness.   If you have had an angiogram or had a pacemaker or AICD placed, and experience:  1. Bleeding, drainage or swelling at the surgical / puncture site.  2. Fever greater than 100.0 F  3. Shock from internal defibrillator.  4. Cool and / or numb extremities.      · Is patient discharged on Warfarin / Coumadin?   No     Depression / Suicide Risk    As you are discharged from this [facility], it is important to learn how to keep safe from harming yourself.    Recognize the warning signs:  · Abrupt changes in personality, positive or negative- including increase in energy   · Giving away possessions  · Change in eating patterns- significant weight changes-  positive or negative  · Change in sleeping patterns- unable to sleep or sleeping all the  time   · Unwillingness or inability to communicate  · Depression  · Unusual sadness, discouragement and loneliness  · Talk of wanting to die  · Neglect of personal appearance   · Rebelliousness- reckless behavior  · Withdrawal from people/activities they love  · Confusion- inability to concentrate     If you or a loved one observes any of these behaviors or has concerns about self-harm, here's what you can do:  · Talk about it- your feelings and reasons for harming yourself  · Remove any means that you might use to hurt yourself (examples: pills, rope, extension cords, firearm)  · Get professional help from the community (Mental Health, Substance Abuse, psychological counseling)  · Do not be alone:Call your Safe Contact- someone whom you trust who will be there for you.  · Call your local CRISIS HOTLINE 679-4360 or 381-294-4820  · Call your local Children's Mobile Crisis Response Team [place] (055) 571-4856 or www.[employer]  · Call the toll free National Suicide Prevention Hotlines   · National Suicide Prevention Lifeline 555-640-GBRZ (2084)  · National Hope Line Network 800-SUICIDE (759-8155)

## 2022-04-03 NOTE — CARE PLAN
Problem: Care Map:  Day of Discharge Optimal Outcome for the Heart Failure Patient  Goal: Day of Discharge:  Optimal Care of the heart failure patient  Outcome: Progressing   The patient is Stable - Low risk of patient condition declining or worsening    Shift Goals  Clinical Goals: I&Os  Patient Goals: Home  Family Goals: N/A    Progress made toward(s) clinical / shift goals:  heart failure education progressing, patient knows to take meds and daily weights and record in HF book    Patient is not progressing towards the following goals: N/A

## 2022-04-03 NOTE — PROGRESS NOTES
Pt dc'd in stable and satisfactory condition. IV and monitor removed; monitor room notified. Pt left unit via walking with friend. Personal belongings with pt when leaving unit. Pt given discharge instructions prior to leaving unit including where to  prescriptions and when to follow-up; verbalizes understanding. Copy of discharge instructions with pt and in the chart.    Reinforced the importance of returning to  meds from [facility]. Patient and friend both expressed understanding.

## 2022-04-03 NOTE — PROGRESS NOTES
Bedside report received from Marilyn Nurse Apprentice and KEY Jose. Patient is alert and oriented x  4, room air, SR on the monitor. POC discussed. Fall precautions are in place. Call light is in reach.

## 2022-04-03 NOTE — DISCHARGE SUMMARY
Discharge Summary    CHIEF COMPLAINT ON ADMISSION  Chief Complaint   Patient presents with   • Shortness of Breath   • Cough   • Weakness       Reason for Admission  SOB     Admission Date  3/29/2022    CODE STATUS  Full Code    HPI & HOSPITAL COURSE  66 yr old male with a PMH of DM, HTN, alcohol abuse, methamphetamine abuse who presented with SOB and was treated for acute CHF exacerbation with bipap and IV diuresis. 2D echo revealed LVEF 35%.  Cardiology was consulted and the patient underwent cardiac catheterization that revealed severe obstructive two-vessel coronary artery disease with chronic total occlusions of the mid right coronary artery and the ostial second obtuse marginal branch.  He was then evaluated by CT surgery but was considered not to be a candidate for CABG.  Interventional cardiology and CT surgery both recommended optimizing medical management for now.  He is to follow-up with cardiology as outpatient to consider  in the future.  He was started on aspirin, Plavix high intensity statin, Toprol and Aldactone.  He will be continued on losartan.  He was instructed to undergo a BMP in 5 days and follow-up with cardiology and PCP in clinic.  He was counseled on methamphetamine cessation.        Therefore, he is discharged in fair and stable condition to home with close outpatient follow-up.    The patient met 2-midnight criteria for an inpatient stay at the time of discharge.    Discharge Date  4/3/2022    FOLLOW UP ITEMS POST DISCHARGE  As above    DISCHARGE DIAGNOSES  Principal Problem:    HFrEF (heart failure with reduced ejection fraction) ([facility]) POA: Yes      Overview: F  Active Problems:    NSTEMI (non-ST elevated myocardial infarction) ([facility]) POA: Yes    Alcohol dependency ([facility]) POA: Yes    CHF (congestive heart failure), NYHA class I, acute on chronic, diastolic ([facility]) POA: Yes    Methamphetamine abuse ([facility]) POA: Yes    CAD (coronary artery disease) POA: Unknown  Resolved Problems:     Pneumonia POA: Yes    Hyperglycemia POA: Yes    Sepsis ([facility]) POA: Yes    Acute respiratory failure with hypoxia ([facility]) POA: Yes      FOLLOW UP  Future Appointments   Date Time Provider Department Center   4/5/2022  2:00 PM ALY Ferrera [facility] None   4/29/2022  1:00 PM Vinh Arevalo M.D. [facility] None     [facility]  [address] [facility] [address]  [phone]  Go on 4/6/2022  Please go as a walk in appointment on 4/6/22 at 9 am to receive immediate follow up care on your heart failure diagnosis and to receive a referral for a cardiologist. Thank you.    Macarena Bailey M.D.  [address] [facility] [address]  [facility] [address]  [phone]    Schedule an appointment as soon as possible for a visit in 1 week        MEDICATIONS ON DISCHARGE     Medication List      START taking these medications      Instructions   aspirin 81 MG EC tablet  Start taking on: April 4, 2022   Take 1 Tablet by mouth every day.  Dose: 81 mg     atorvastatin 80 MG tablet  Commonly known as: LIPITOR   Take 1 Tablet by mouth every evening.  Dose: 80 mg     carvedilol 6.25 MG Tabs  Commonly known as: COREG   Take 1 Tablet by mouth 2 times a day with meals.  Dose: 6.25 mg     clopidogrel 75 MG Tabs  Start taking on: April 4, 2022  Commonly known as: PLAVIX   Take 1 Tablet by mouth every day.  Dose: 75 mg     furosemide 40 MG Tabs  Start taking on: April 4, 2022  Commonly known as: LASIX   Take 1 Tablet by mouth every day.  Dose: 40 mg     spironolactone 25 MG Tabs  Start taking on: April 4, 2022  Commonly known as: ALDACTONE   Take 0.5 Tablets by mouth every day.  Dose: 12.5 mg        CONTINUE taking these medications      Instructions   cyanocobalamin 1000 MCG/ML Soln  Commonly known as: VITAMIN B-12   Inject 1,000 mcg into the shoulder, thigh, or buttocks every day.  Dose: 1,000 mcg     Empagliflozin 25 MG Tabs   Take 25 mg by mouth every day.  Dose: 25 mg     ferrous gluconate 324 (38 Fe)  MG Tabs  Commonly known as: FERGON   Take 324 mg by mouth every morning with breakfast.  Dose: 324 mg     losartan 100 MG Tabs  Commonly known as: COZAAR   Take 100 mg by mouth every day.  Dose: 100 mg     metformin 1000 MG tablet  Commonly known as: GLUCOPHAGE   Take 1,000 mg by mouth 2 times a day with meals.  Dose: 1,000 mg     pioglitazone 45 MG Tabs  Commonly known as: ACTOS   Take 45 mg by mouth every day.  Dose: 45 mg     simvastatin 40 MG Tabs  Commonly known as: ZOCOR   Take 40 mg by mouth every evening.  Dose: 40 mg     traZODone 50 MG Tabs  Commonly known as: DESYREL   Take 50 mg by mouth every evening.  Dose: 50 mg            Allergies  No Known Allergies    DIET  Orders Placed This Encounter   Procedures   • Diet Order Diet: Cardiac     Standing Status:   Standing     Number of Occurrences:   1     Order Specific Question:   Diet:     Answer:   Cardiac [6]       ACTIVITY  As tolerated.  Weight bearing as tolerated    CONSULTATIONS  Cardiology Dr. Macarena Bailey  CT surgery Dr. Jerome Figueroa    PROCEDURES  CORONARY ANGIOGRAPHY:  1. The left main coronary artery is a short, patent vessel that bifurcates into the left anterior descending and left circumflex coronary arteries.  2. The left anterior descending coronary artery is a large, transapical vessel with proximal 20% disease, a long section of mid 30% disease, focal mid-distal 40-50% disease, and distal luminal irregularities.  It supplies a moderate first diagonal branch with mild disease.  3. The left circumflex coronary artery is a large, nondominant vessel with proximal 20% disease followed by mid luminal irregularities.  The ongoing AV groove vessel is small after the bifurcation of a large, occluded second obtuse marginal branch and supplies a small distal third obtuse marginal branch.  The first obtuse marginal branch is a moderate to large vessel with ostial 50% disease followed by luminal irregularities.  The second obtuse marginal  branch is a large vessel that is chronically occluded at the ostium and refills by left to left collaterals.  4. The right coronary artery is a moderate, dominant vessel with proximal 50% disease followed by a mid chronic total occlusion.  The distal vessel refills by left to right collaterals and appears to have a large posterior descending coronary artery with luminal irregularities and a small posterolateral branch.     IMPRESSION:  1. Severe obstructive two-vessel coronary artery disease with chronic total occlusions of the mid right coronary artery and the ostial second obtuse marginal branch.  2. Mildly elevated left heart filling pressures.     RECOMMENDATIONS:  1. Return to floor with continued care per primary service.  2. TR band release per protocol.  3. Evaluation by cardiothoracic surgery for coronary artery bypass grafting and assessment of likelihood that a LIMA-LAD graft will remain patent.  4. Continue optimal medical management of ischemic cardiomyopathy and severe coronary artery disease.      LABORATORY  Lab Results   Component Value Date    SODIUM 141 04/02/2022    POTASSIUM 3.8 04/02/2022    CHLORIDE 105 04/02/2022    CO2 24 04/02/2022    GLUCOSE 100 (H) 04/02/2022    BUN 26 (H) 04/02/2022    CREATININE 0.88 04/02/2022        Lab Results   Component Value Date    WBC 5.8 04/03/2022    HEMOGLOBIN 12.7 (L) 04/03/2022    HEMATOCRIT 38.7 (L) 04/03/2022    PLATELETCT 285 04/03/2022        Total time of the discharge process exceeds 42 minutes.

## 2022-04-05 ENCOUNTER — OFFICE VISIT (OUTPATIENT)
Dept: CARDIOLOGY | Facility: MEDICAL CENTER | Age: 67
End: 2022-04-05
Payer: COMMERCIAL

## 2022-04-05 VITALS
OXYGEN SATURATION: 97 % | SYSTOLIC BLOOD PRESSURE: 102 MMHG | DIASTOLIC BLOOD PRESSURE: 52 MMHG | WEIGHT: 180 LBS | RESPIRATION RATE: 16 BRPM | HEIGHT: 66 IN | HEART RATE: 72 BPM | BODY MASS INDEX: 28.93 KG/M2

## 2022-04-05 DIAGNOSIS — I50.20 ACC/AHA STAGE C SYSTOLIC HEART FAILURE (HCC): ICD-10-CM

## 2022-04-05 DIAGNOSIS — F10.20 UNCOMPLICATED ALCOHOL DEPENDENCE (HCC): ICD-10-CM

## 2022-04-05 DIAGNOSIS — I50.9 HEART FAILURE, NYHA CLASS 2 (HCC): ICD-10-CM

## 2022-04-05 DIAGNOSIS — F15.10 METHAMPHETAMINE ABUSE (HCC): ICD-10-CM

## 2022-04-05 DIAGNOSIS — E11.65 UNCONTROLLED TYPE 2 DIABETES MELLITUS WITH HYPERGLYCEMIA (HCC): ICD-10-CM

## 2022-04-05 DIAGNOSIS — R06.02 SOB (SHORTNESS OF BREATH): ICD-10-CM

## 2022-04-05 DIAGNOSIS — I25.10 CORONARY ARTERY DISEASE INVOLVING NATIVE CORONARY ARTERY OF NATIVE HEART WITHOUT ANGINA PECTORIS: ICD-10-CM

## 2022-04-05 DIAGNOSIS — I25.5 ISCHEMIC CARDIOMYOPATHY: ICD-10-CM

## 2022-04-05 PROCEDURE — 94618 PULMONARY STRESS TESTING: CPT | Performed by: NURSE PRACTITIONER

## 2022-04-05 PROCEDURE — 99214 OFFICE O/P EST MOD 30 MIN: CPT | Mod: 25 | Performed by: NURSE PRACTITIONER

## 2022-04-05 ASSESSMENT — ENCOUNTER SYMPTOMS
FLANK PAIN: 1
ORTHOPNEA: 0
PND: 0
DIZZINESS: 0
COUGH: 0
ABDOMINAL PAIN: 0
MYALGIAS: 0
PALPITATIONS: 0
SHORTNESS OF BREATH: 0
FEVER: 0
CLAUDICATION: 0

## 2022-04-05 ASSESSMENT — FIBROSIS 4 INDEX: FIB4 SCORE: 2.23

## 2022-04-05 NOTE — PROGRESS NOTES
Chief Complaint   Patient presents with   • Congestive Heart Failure     F/V DX: CHF (congestive heart failure), NYHA class I, acute on chronic, diastolic (HCC)   • Coronary Artery Disease   • MI (Non ST Segment Elevation MI)       Subjective     Tucker Frederick is a 66 y.o. male who presents today for follow-up on his heart failure after his recent hospitalization.    New patient of the office.  Patient had a recent hospitalization from 3/29/2022 through 4/3/2022 for shortness of breath.  Patient was admitted for acute heart failure exacerbation.  He was diuresed and started on BiPAP.  Echocardiogram revealed an LVEF of 35%.  Cardiology was consulted and recommended a cardiac catheterization which revealed severe obstructive two-vessel CAD with  of the mid RCA and the ostial second obtuse marginal branch.  CT surgery was consulted and determined patient was not a candidate for CABG.  Interventional cardiology was consulted and recommended optimizing medical therapy before considering a  procedure in the future.  Patient was discharged home on GDMT and recommended on meth cessation.    Patient reports feeling fairly well today, except for having symptoms of kidney stones.  He does have a history of kidney stones.  He otherwise denies chest pain, palpitations, orthopnea, PND, edema, shortness of breath or dizziness/lightheadedness.  He reports he did have abdominal distention, but that has resolved.  He does mention having some congestion.    He reports he did weigh himself this morning and his weight is at 179 pounds.    Patient currently does not have any insurance coverage and [facility]    Patient reports he has quit using meth since his admission and he also mentions he has not been drinking any alcohol.  He was drinking 8-12 beers per day prior to his admission.  He does have a history of cocaine use at which she quit in 2010.  He quit smoking in 2010.    Initial 6 minute walk  test, patient was able to complete 344 m during his 6 minute walk test.  His O2 saturation at baseline was 97% and at the end of the test, the O2 saturation was 97%.  He reported level 4 of dyspnea on Chris scale.  Patient was able to walk for 6 minutes.    MLWHF score 41    Additonally, patient has the following medical problems:    -Diabetes    -Hypertension    -His father  of a heart attack at age 55    Past Medical History:   Diagnosis Date   • CHF (congestive heart failure) (HCC)    • Diabetes (HCC)    • Hyperlipidemia    • Hypertension    • Pulmonary edema      History reviewed. No pertinent surgical history.  Family History   Problem Relation Age of Onset   • Heart Disease Father          of MI at 55   • Heart Attack Father    • Lung Cancer Father    • Diabetes Mother    • Cancer Mother         unknown source, metastasize   • Multiple Sclerosis Sister      Social History     Socioeconomic History   • Marital status: Single     Spouse name: Not on file   • Number of children: Not on file   • Years of education: Not on file   • Highest education level: Not on file   Occupational History   • Not on file   Tobacco Use   • Smoking status: Former Smoker     Packs/day: 1.00     Years: 30.00     Pack years: 30.00     Types: Cigarettes     Quit date:      Years since quittin.2   • Smokeless tobacco: Never Used   Vaping Use   • Vaping Use: Never used   Substance and Sexual Activity   • Alcohol use: Yes     Comment: 8-12 beers daily,  till 3/29/2022   • Drug use: Not Currently     Types: Cocaine, Methamphetamines     Comment: Quit Cocaine , last Meth use 3/28/2022   • Sexual activity: Not on file   Other Topics Concern   • Not on file   Social History Narrative   • Not on file     Social Determinants of Health     Financial Resource Strain: Not on file   Food Insecurity: Not on file   Transportation Needs: Not on file   Physical Activity: Not on file   Stress: Not on file   Social Connections:  Not on file   Intimate Partner Violence: Not on file   Housing Stability: Not on file     No Known Allergies  Outpatient Encounter Medications as of 4/5/2022   Medication Sig Dispense Refill   • Multiple Vitamin (MULTIVITAMIN ADULT PO) Take  by mouth.     • aspirin 81 MG EC tablet Take 1 Tablet by mouth every day. 30 Tablet 0   • atorvastatin (LIPITOR) 80 MG tablet Take 1 Tablet by mouth every evening. 30 Tablet 0   • carvedilol (COREG) 6.25 MG Tab Take 1 Tablet by mouth 2 times a day with meals. 60 Tablet 0   • clopidogrel (PLAVIX) 75 MG Tab Take 1 Tablet by mouth every day. 30 Tablet 0   • furosemide (LASIX) 40 MG Tab Take 1 Tablet by mouth every day. 30 Tablet 0   • spironolactone (ALDACTONE) 25 MG Tab Take 0.5 Tablet by mouth every day. 30 Tablet 3   • cyanocobalamin (VITAMIN B-12) 1000 MCG/ML Solution Inject 1,000 mcg into the shoulder, thigh, or buttocks every day.     • Empagliflozin 25 MG Tab Take 25 mg by mouth every day.     • ferrous gluconate (FERGON) 324 (38 Fe) MG Tab Take 324 mg by mouth every morning with breakfast.     • losartan (COZAAR) 100 MG Tab Take 100 mg by mouth every day.     • pioglitazone (ACTOS) 45 MG Tab Take 45 mg by mouth every day.     • traZODone (DESYREL) 50 MG Tab Take 50 mg by mouth every evening.     • metformin (GLUCOPHAGE) 1000 MG tablet Take 1,000 mg by mouth 2 times a day with meals.     • [DISCONTINUED] simvastatin (ZOCOR) 40 MG Tab Take 40 mg by mouth every evening.       No facility-administered encounter medications on file as of 4/5/2022.     Review of Systems   Constitutional: Negative for fever and malaise/fatigue.   HENT: Positive for congestion.    Respiratory: Negative for cough and shortness of breath.    Cardiovascular: Negative for chest pain, palpitations, orthopnea, claudication, leg swelling and PND.   Gastrointestinal: Negative for abdominal pain.   Genitourinary: Positive for flank pain and hematuria.   Musculoskeletal: Negative for myalgias.  "  Neurological: Negative for dizziness.   All other systems reviewed and are negative.             Objective     /52 (BP Location: Left arm, Patient Position: Sitting, BP Cuff Size: Adult)   Pulse 72   Resp 16   Ht 1.676 m (5' 6\")   Wt 81.6 kg (180 lb)   SpO2 97%   BMI 29.05 kg/m²     Physical Exam  Vitals reviewed.   Constitutional:       Appearance: He is well-developed.   HENT:      Head: Normocephalic and atraumatic.   Eyes:      Pupils: Pupils are equal, round, and reactive to light.   Neck:      Vascular: No JVD.   Cardiovascular:      Rate and Rhythm: Normal rate and regular rhythm.      Heart sounds: Normal heart sounds.   Pulmonary:      Effort: Pulmonary effort is normal. No respiratory distress.      Breath sounds: Normal breath sounds. No wheezing or rales.   Abdominal:      General: Bowel sounds are normal.      Palpations: Abdomen is soft.   Musculoskeletal:         General: Normal range of motion.      Cervical back: Normal range of motion and neck supple.      Right lower leg: No edema.      Left lower leg: No edema.   Skin:     General: Skin is warm and dry.   Neurological:      General: No focal deficit present.      Mental Status: He is alert and oriented to person, place, and time.   Psychiatric:         Behavior: Behavior normal.       Lab Results   Component Value Date/Time    CHOLSTRLTOT 159 03/31/2022 05:30 AM    LDL 64 03/31/2022 05:30 AM    HDL 59 03/31/2022 05:30 AM    TRIGLYCERIDE 178 (H) 03/31/2022 05:30 AM       Lab Results   Component Value Date/Time    SODIUM 141 04/02/2022 02:21 AM    POTASSIUM 3.8 04/02/2022 02:21 AM    CHLORIDE 105 04/02/2022 02:21 AM    CO2 24 04/02/2022 02:21 AM    GLUCOSE 100 (H) 04/02/2022 02:21 AM    BUN 26 (H) 04/02/2022 02:21 AM    CREATININE 0.88 04/02/2022 02:21 AM     Lab Results   Component Value Date/Time    ALKPHOSPHAT 87 03/30/2022 05:45 AM    ASTSGOT 84 (H) 03/30/2022 05:45 AM    ALTSGPT 74 (H) 03/30/2022 05:45 AM    TBILIRUBIN 0.3 " 03/30/2022 05:45 AM      Transthoracic Echo Report 3/30/2022   Normal left ventricular chamber size.  Moderately reduced left ventricular systolic function.  The left ventricular ejection fraction is visually estimated to be 30-  35%.  Global hypokinesis with severe hypokinesis proximal inferrior posterior   wall, .  Probably mildly dilated right venricular with reduced right ventricular   systolic function and hypokinetic right ventricular free wall.  No valvular abnormalities.   No prior study is available for comparison.     Coronary angiogram 4/1/2022  HEMODYNAMICS:   1. Aortic pressure: 89/57 mmHg  2. Pre A-wave pressure: 20 mmHg  3. No significant aortic gradient on pullback     CORONARY ANGIOGRAPHY:  1. The left main coronary artery is a short, patent vessel that bifurcates into the left anterior descending and left circumflex coronary arteries.  2. The left anterior descending coronary artery is a large, transapical vessel with proximal 20% disease, a long section of mid 30% disease, focal mid-distal 40-50% disease, and distal luminal irregularities.  It supplies a moderate first diagonal branch with mild disease.  3. The left circumflex coronary artery is a large, nondominant vessel with proximal 20% disease followed by mid luminal irregularities.  The ongoing AV groove vessel is small after the bifurcation of a large, occluded second obtuse marginal branch and supplies a small distal third obtuse marginal branch.  The first obtuse marginal branch is a moderate to large vessel with ostial 50% disease followed by luminal irregularities.  The second obtuse marginal branch is a large vessel that is chronically occluded at the ostium and refills by left to left collaterals.  4. The right coronary artery is a moderate, dominant vessel with proximal 50% disease followed by a mid chronic total occlusion.  The distal vessel refills by left to right collaterals and appears to have a large posterior descending  coronary artery with luminal irregularities and a small posterolateral branch.     IMPRESSION:  1. Severe obstructive two-vessel coronary artery disease with chronic total occlusions of the mid right coronary artery and the ostial second obtuse marginal branch.  2. Mildly elevated left heart filling pressures.     RECOMMENDATIONS:  1. Return to floor with continued care per primary service.  2. TR band release per protocol.  3. Evaluation by cardiothoracic surgery for coronary artery bypass grafting and assessment of likelihood that a LIMA-LAD graft will remain patent.  4. Continue optimal medical management of ischemic cardiomyopathy and severe coronary artery disease.             Assessment & Plan     1. SOB (shortness of breath)     2. ACC/AHA stage C systolic heart failure (HCC)     3. Heart failure, NYHA class 2 (HCC)     4. Ischemic cardiomyopathy     5. Coronary artery disease involving native coronary artery of native heart without angina pectoris     6. Methamphetamine abuse (HCC)     7. Uncomplicated alcohol dependence (HCC)     8. Uncontrolled type 2 diabetes mellitus with hyperglycemia (HCC)         Medical Decision Making: Today's Assessment/Status/Plan:         Patient reports he does not have insurance coverage, but does state [facility].  [facility].  Patient encouraged to get back in with her clinic if he is able to get authorization through the [facility].    HFrEF, Stage C, Class 2, LVEF 35%: Based on physical examination findings, patient is euvolemic. No JVD, lungs are clear to auscultation, no pitting edema in bilateral lower extremities, no ascites.  -Heart failure due to ischemic versus substance use cardiomyopathy  -ACE-I/ARB/ARNI: Continue losartan 100 mg daily  -Evidence Based Beta-blocker: Continue carvedilol 6.25 mg twice a day  -Aldosterone Antagonist: Continue spironolactone 12.5 mg daily  -Diuretic: Continue furosemide 40 mg daily  -SGLT2  inhibitor: Continue empagliflozin 25 mg daily  -Labs: No labs due at this time  -Repeat Echo in 3 months, if LVEF not >35%, then will discuss/consider ICD for primary Prevention  -Reinforced s/sx of worsening heart failure with patient and weight monitoring. Pt verbalizes understanding. Pt to call office or RTC if present.    -PUMP line number [phone] (PUMP) reviewed with patient  -Heart Failure Education: pt to be contacted by HF nurse for further education and to make sure patient [facility]  -Advanced care planning: No advanced directive and POLST   -Patient not referred to pharmacotherapy as he establishing with [facility]    CAD,  of RCA,  of OM, 50% stenosis of mid LAD:  -[facility]  -Not a CABG candidate per CTS  -Recommend if able to refer back to Dr. Arevalo for  procedure once patient optimized on medical therapy  -Continue clopidogrel 75 mg daily  -Continue aspirin 81 mg daily  -Continue atorvastatin 80 mg daily  -Patient on SGLT2 inhibitor for CV risk reduction  -Continue smoking cessation, patient reports he has not smoked since 2010    Meth use:  -Patient continues to abstain from use    Alcohol use:  -Encourage patient to abstain from alcohol use    Hyperlipidemia:  -Last LDL 64 on 3/31/2022  -Continue atorvastatin 80 mg daily    Diabetes:  -Uncontrolled, last A1c 9.3 on 3/31/2022  -[facility] with [facility]    FU in clinic as needed needed.  Again, patient plans on [facility] with [facility], if able he will get authorization to come back to our clinic.     Patient verbalizes understanding and agrees with the plan of care.     PLEASE NOTE: This Note was created using voice recognition Software. I have made every reasonable attempt to correct obvious errors, but I expect that there are errors of grammar and possibly content that I did not discover before finalizing the note

## 2022-04-08 ENCOUNTER — TELEPHONE (OUTPATIENT)
Dept: CARDIOLOGY | Facility: MEDICAL CENTER | Age: 67
End: 2022-04-08
Payer: COMMERCIAL

## 2022-04-08 ASSESSMENT — NEW YORK HEART ASSOCIATION (NYHA) CLASSIFICATION: NYHA FUNCTIONAL CLASS: CLASS II

## 2022-04-08 NOTE — TELEPHONE ENCOUNTER
Patient called PUMP line yesterday evening with a low BP of 82/59 and was having dizziness, on returning his call his BP is now normal/ high at 136/72, looking back patient had carvedilol doses too closely together, he is feeling better today, we discussed his heart failure, medications, activity and sodium intake. [facility]   Education Narrative  Reviewed anatomy and physiology of heart failure with patient and patient friend on the phone. Went over heart failure worksheet and patient's individual HF diagnosis, EF, risk factors, general medication classes and indications, as well as personal goals.  Goals: Patient's primary goal is to continue limiting his alcohol intake      Discussed daily weights, sodium restriction, worsening signs and symptoms to report to physician, heart medications, and importance of adherence to medication regimen. Emphasized recommendation from AHA/AAHFN to keep daily sodium intake between 1500mg-2000mg.      Reviewed dietary handouts, advanced care planning classes, and advance directive planning handout. Discussed dietary considerations and reviewed Seven Day Heart Healthy Meal Plan by [employer].     Invited patient and family members/friends to HF support group and encouraged patient to call Heart Failure clinic during normal business hours with any questions.  Heart Failure program card with number given to patient.           Patient states full understanding of all information given.                      System Assessment  NYHA Functional Class Assessment: Class II  ACC/AHA HF Stage: C    Smoking Hx  Have you Ever Smoked: Yes  Have you Smoked in the Last 12 Mos: No     Confirm Quit Date: 01/01/10       Alcohol Hx  Do you Drink?: Yes                            Illicit Drug Hx  Illicit Drug History: Yes    Social Hx  Social History: Lives with Other  Level of Support: Good  Advance Directives: None    Education  Symptoms: Verbalizes  understanding  Weighing: Verbalizes Understanding  Weight Gain Response: Verbalizes Understanding   Recording Data: Verbalizes understanding  Teach Back Failures: Teach Back Successful  Compliance: Patient is Compliant       Medications  Medication Reconciliation : Complete  Medication Counseling Provided: Verbalizes Understanding  Able to Accurately Identify Medication Indications: Some  Medication Discrepancies: None  Is Patient on an Evidence Based Beta Blocker: Yes  Is Patient on ACE-1 or ARB: Yes    Dietary Assessment  Food Labels: Verbalizes Understanding  Foods High in Sodium: Verbalizes Understanding  Daily Sodium Intake: Verbalizes Understanding  Diet: Verbalizes Understanding  Food Preparation: Verbalizes Understanding  Eating Out Plan: Verbalizes understanding  Healthier Options: Verbalizes Understanding

## 2022-04-11 ENCOUNTER — TELEPHONE (OUTPATIENT)
Dept: CARDIOLOGY | Facility: MEDICAL CENTER | Age: 67
End: 2022-04-11
Payer: COMMERCIAL

## 2022-04-11 ENCOUNTER — PATIENT MESSAGE (OUTPATIENT)
Dept: CARDIOLOGY | Facility: MEDICAL CENTER | Age: 67
End: 2022-04-11
Payer: COMMERCIAL

## 2022-04-12 NOTE — TELEPHONE ENCOUNTER
----- Message from Vinh Arevalo M.D. sent at 4/10/2022 11:28 AM PDT -----  Regarding: RE: WILLIAMS Patele,    Can you get him to see me after [facility] visit?    Thanks  ----- Message -----  From: Macarena Bailey M.D.  Sent: 4/2/2022   1:45 PM PDT  To: Vinh Arevalo M.D.  Subject: FYI                                              Just FYI,  Surgery did turndown patient.  Will be sent home with medical therapy.  Close follow-up with [facility] to look at his diuretics and kidney function.  Will have close follow-up with you to see if you want to contemplate  and also keep an eye on his EF given his risk for VT.  He has an ugly baseline ECG.  L

## 2022-04-12 NOTE — TELEPHONE ENCOUNTER
Attempted to call pt, unable to reach.  Left voicemail to return this call at their earliest convenience.    MyChart message sent requesting more information regarding MD clarification, awaiting pt response.

## 2022-04-19 NOTE — PATIENT COMMUNICATION
Follow Up Visit  Ramez Taylor Ass't  You 7 hours ago (10:13 AM)     Pt called regarding an appt with [facility]. Pt is currently scheduled with [facility] for 4-. I am not showing any sooner availability with [facility]. Pt wants to know if it is ok to wait until the 29th or if he needs to be seen sooner. Please call Pt back at [phone].     Thank you    Routing comment      Upon chart review pt [facility] scheduled 4/29.  Clarification relayed to MD for advise.

## 2022-04-19 NOTE — PROGRESS NOTES
The 29th should be fine unless he is having unstable symptoms of some kind. He saw [facility]onia already post-discharge for HF.

## 2022-04-29 ENCOUNTER — TELEPHONE (OUTPATIENT)
Dept: CARDIOLOGY | Facility: MEDICAL CENTER | Age: 67
End: 2022-04-29

## 2022-04-29 ENCOUNTER — OFFICE VISIT (OUTPATIENT)
Dept: CARDIOLOGY | Facility: MEDICAL CENTER | Age: 67
End: 2022-04-29
Payer: COMMERCIAL

## 2022-04-29 VITALS
HEIGHT: 66 IN | WEIGHT: 178 LBS | BODY MASS INDEX: 28.61 KG/M2 | HEART RATE: 74 BPM | SYSTOLIC BLOOD PRESSURE: 124 MMHG | DIASTOLIC BLOOD PRESSURE: 70 MMHG | OXYGEN SATURATION: 96 % | RESPIRATION RATE: 16 BRPM

## 2022-04-29 DIAGNOSIS — I25.84 CORONARY ARTERY DISEASE DUE TO CALCIFIED CORONARY LESION: ICD-10-CM

## 2022-04-29 DIAGNOSIS — I10 PRIMARY HYPERTENSION: ICD-10-CM

## 2022-04-29 DIAGNOSIS — I25.10 CORONARY ARTERY DISEASE DUE TO CALCIFIED CORONARY LESION: ICD-10-CM

## 2022-04-29 DIAGNOSIS — E78.2 MIXED HYPERLIPIDEMIA: ICD-10-CM

## 2022-04-29 DIAGNOSIS — F10.20 UNCOMPLICATED ALCOHOL DEPENDENCE (HCC): ICD-10-CM

## 2022-04-29 DIAGNOSIS — F15.10 METHAMPHETAMINE ABUSE (HCC): ICD-10-CM

## 2022-04-29 DIAGNOSIS — I25.5 ISCHEMIC CARDIOMYOPATHY: ICD-10-CM

## 2022-04-29 DIAGNOSIS — E11.65 UNCONTROLLED TYPE 2 DIABETES MELLITUS WITH HYPERGLYCEMIA (HCC): ICD-10-CM

## 2022-04-29 DIAGNOSIS — I50.20 HFREF (HEART FAILURE WITH REDUCED EJECTION FRACTION) (HCC): ICD-10-CM

## 2022-04-29 PROBLEM — I50.33 CHF (CONGESTIVE HEART FAILURE), NYHA CLASS I, ACUTE ON CHRONIC, DIASTOLIC (HCC): Status: RESOLVED | Noted: 2022-03-31 | Resolved: 2022-04-29

## 2022-04-29 PROBLEM — I21.4 NSTEMI (NON-ST ELEVATED MYOCARDIAL INFARCTION) (HCC): Status: RESOLVED | Noted: 2022-03-29 | Resolved: 2022-04-29

## 2022-04-29 PROCEDURE — 99215 OFFICE O/P EST HI 40 MIN: CPT | Performed by: INTERNAL MEDICINE

## 2022-04-29 RX ORDER — SACUBITRIL AND VALSARTAN 97; 103 MG/1; MG/1
1 TABLET, FILM COATED ORAL 2 TIMES DAILY
Qty: 180 TABLET | Refills: 3 | Status: SHIPPED | OUTPATIENT
Start: 2022-04-29 | End: 2023-03-03 | Stop reason: SDUPTHER

## 2022-04-29 ASSESSMENT — FIBROSIS 4 INDEX: FIB4 SCORE: 2.23

## 2022-04-29 NOTE — TELEPHONE ENCOUNTER
"Upon chart review per MD last OV note, \"We will further optimize his regimen by discontinuing losartan and starting Entresto.  -Discontinue losartan  -Start Entresto  twice daily\"    Medication list updated.    Called Elliot to review MD/PAC recommendations and s/w Hunter.  Hunter verbalizes understanding and states no other concerns or questions at this time.  She is appreciative of information given.    "

## 2022-04-29 NOTE — PATIENT INSTRUCTIONS
Medical Plan:  1. Discontinue losartan and aspirin  2. Start taking Entresto  mg twice daily  3. Start taking spironolactone 25 mg daily (full tablet)  4. Start taking a half-tablet of furosemide (20 mg dose) daily, if you gain weight or have increased shortness of breath, go back to full-tablet  5. [facility], [facility] results to [facility]  6. Follow up in 1 month    Heart Healthy Lifestyle Recommendations:  1. Perform at least 150 min of moderate-intensity or 75 min of vigorous-intensity aerobic exercise per week  2. Eliminate nearly all sodas and diet sodas from diet  3. Eliminate nearly all artifical sweeteners from diet  4. Eliminate nearly all processed meats from diet  5. Reduce intake of sugary foods, simple carbohydrates (including breads, tortillas, and other baked goods), fried foods, and ultra-processed foods (most foods out of a package)  6. Moderate intake of cheese, eggs, and fresh cuts of land animal meats  7. Focus on consuming a real food diet including fresh vegetables fruits, unsalted nuts, legumes, whole grains, healthy oils (olive, avocado, canola, flaxseed, grapeseed), and seafood (particularly fish)

## 2022-04-29 NOTE — PROGRESS NOTES
CARDIOLOGY CONSULTATION NOTE      Date of Visit: 4/29/2022    [facility]    Patient Name: Tucker Frederick    YOB: 1955  MRN: [id number]     Reason for Visit:   Post-hospital follow-up     Patient Story:   Tucker Frederick is a 66 year-old gentleman with a past medical history of heart failure with reduced ejection fraction, severe coronary artery disease ( of RCA and OM2), type 2 diabetes, hypertension, dyslipidemia, prior methamphetamine abuse, and alcohol abuse. Briefly, he was recently admitted to [facility] with worsening shortness of breath and abdominal distention.  He was found to have newly diagnosed left ventricular systolic dysfunction as well as a significantly elevated NT-proBNP and troponin.  He underwent coronary angiography to further evaluate his abnormalities, which demonstrated chronic occlusions of the mid RCA and ostial OM2.  He was evaluated by CT Surgery and was not felt to be a surgical candidate.  After diuresis and initiation of a neurohormonal regimen, he was discharged with Cardiology follow-up.    He presents today to establish care.  He states that he feels much better since his admission and has not had significant weight gain or worsening shortness of breath.  He does note that he intermittently is a little lightheaded, however.  He has also noticed some increased bruising on DAPT, but overall feels like he is tolerating his new medications.  He reports that he has stopped using amphetamines and states that he only use these heavily until 2019.  He has been concerned about his liver function and has cut back from 8-12 alcoholic drinks daily to about 3-5.     Medications and Allergies:     Current Outpatient Medications   Medication Sig Dispense Refill   • Cyanocobalamin (VITAMIN B 12 PO) Take  by mouth.     • Semaglutide (OZEMPIC, 0.25 OR 0.5 MG/DOSE, SC) Inject  under the skin.     • sacubitril-valsartan (ENTRESTO)  MG Tab tablet Take 1 Tablet by  mouth 2 times a day. 180 Tablet 3   • Multiple Vitamin (MULTIVITAMIN ADULT PO) Take  by mouth.     • aspirin 81 MG EC tablet Take 1 Tablet by mouth every day. 30 Tablet 0   • atorvastatin (LIPITOR) 80 MG tablet Take 1 Tablet by mouth every evening. 30 Tablet 0   • carvedilol (COREG) 6.25 MG Tab Take 1 Tablet by mouth 2 times a day with meals. 60 Tablet 0   • clopidogrel (PLAVIX) 75 MG Tab Take 1 Tablet by mouth every day. 30 Tablet 0   • furosemide (LASIX) 40 MG Tab Take 1 Tablet by mouth every day. 30 Tablet 0   • spironolactone (ALDACTONE) 25 MG Tab Take 0.5 Tablet by mouth every day. 30 Tablet 3   • Empagliflozin 25 MG Tab Take 25 mg by mouth every day.     • ferrous gluconate (FERGON) 324 (38 Fe) MG Tab Take 324 mg by mouth every morning with breakfast.     • losartan (COZAAR) 100 MG Tab Take 100 mg by mouth every day.     • traZODone (DESYREL) 50 MG Tab Take 50 mg by mouth every evening.     • metformin (GLUCOPHAGE) 1000 MG tablet Take 1,000 mg by mouth 2 times a day with meals.       No current facility-administered medications for this visit.     No Known Allergies     Medical Decision Making:   # Heart failure with reduced ejection fraction: This is likely multifactorial from ischemic disease as well as polysubstance abuse.  Fortunately, he appears relatively euvolemic today.  We will further optimize his regimen by discontinuing losartan and starting Entresto.  We will also have him start taking a full tablet of spironolactone and reduce his furosemide dose to 20 mg daily.  Given the significant changes, he will need very close follow-up to avoid serious toxicity/complications of his neurohormonal regimen.  We will also arrange for a cardiac MRI in about 2 months.  Depending on his symptoms, left ventricular systolic function, and viability,  PCI may be considered.  -Discontinue losartan  -Start Entresto 97-1 03 twice daily  -Increase spironolactone to 25 mg daily  -Decrease furosemide to 20 mg  "daily  -Continue carvedilol 6.25 mg twice daily  -Cardiac MRI in 2 months    # Severe coronary artery disease ( mid RCA,  ostial OM2): See above for cardiac MRI and revascularization plan.  His LDL is well controlled on moderate dose atorvastatin.  Given increased bruising, will discontinue aspirin.  -Continue furosemide 40 mg nightly  -Continue clopidogrel 75 mg daily    # Hypertension: Well-controlled today, see above for medical management.    # Mixed hyperlipidemia: See above for medical management.    # Type 2 diabetes: Defer glucose control to PCP.  He has already been started on an SGL-2 inhibitor and GLP-1 agonist    # History of polysubstance abuse: He reports stopping methamphetamine use and significantly decreasing his alcohol use.  He was instructed to work towards a goal alcohol intake of 1-2 drinks daily.    Care Time  A total of 45 minutes of care time spent on this patient encounter    Follow Up  1 month     Cardiac Studies and Procedures:   Echocardiography  TTE (3/30/2021)-reviewed  Normal left ventricular chamber size.  Moderately reduced left ventricular systolic function.  The left ventricular ejection fraction is visually estimated to be 30-35%.  Global hypokinesis with severe hypokinesis basal inferolateral wall.  Probably mildly dilated right venricular with reduced right ventricular systolic function and hypokinetic right ventricular free wall.  No valvular abnormalities.     Coronary Angiography/Cardiac Catheterization  CAG (4/1/2022)-independently interpreted  LM: Patent  LAD: Proximal 20%, mid 30%, mid-distal 50%  LCx: Proximal 20%, ostial OM1 50%, ostial  OM2  RCA: Proximal 50%, mid      Vital Signs:   /70 (BP Location: Left arm, Patient Position: Sitting)   Pulse 74   Resp 16   Ht 1.676 m (5' 6\")   Wt 80.7 kg (178 lb)   SpO2 96%    BP Readings from Last 4 Encounters:   04/29/22 124/70   04/05/22 102/52   04/03/22 137/76     Wt Readings from Last 4 Encounters: "   04/29/22 80.7 kg (178 lb)   04/05/22 81.6 kg (180 lb)   04/03/22 81.1 kg (178 lb 12.7 oz)     Body mass index is 28.73 kg/m².     Laboratories:   [facility] (4/15/2022)  Glucose 214  BUN 26  Cr 1.2  Na 133  K 4.4  GFR 67  HDL 37  LDL 57    Total 118    Lipids  Lab Results   Component Value Date/Time    LDL 64 03/31/2022 0530     (H) 03/30/2022 0545     Lab Results   Component Value Date/Time    HDL 59 03/31/2022 0530    HDL 71 03/30/2022 0545       Lab Results   Component Value Date/Time    TRIGLYCERIDE 178 (H) 03/31/2022 0530    TRIGLYCERIDE 177 (H) 03/30/2022 0545       Lab Results   Component Value Date/Time    CHOLSTRLTOT 159 03/31/2022 0530    CHOLSTRLTOT 207 (H) 03/30/2022 0545     Chemistries  Lab Results   Component Value Date/Time    CREATININE 0.88 04/02/2022 0221    CREATININE 0.80 04/01/2022 0059    CREATININE 0.97 03/31/2022 0530    CREATININE 0.86 03/30/2022 2200    CREATININE 0.95 03/30/2022 1032     Lab Results   Component Value Date/Time    BUN 26 (H) 04/02/2022 0221    BUN 27 (H) 04/01/2022 0059    BUN 27 (H) 03/31/2022 0530    BUN 23 (H) 03/30/2022 2200    BUN 24 (H) 03/30/2022 1032     Lab Results   Component Value Date/Time    POTASSIUM 3.8 04/02/2022 0221    POTASSIUM 3.6 04/01/2022 0059    POTASSIUM 3.8 03/31/2022 0530     Lab Results   Component Value Date/Time    SODIUM 141 04/02/2022 0221    SODIUM 141 04/01/2022 0059    SODIUM 141 03/31/2022 0530     Lab Results   Component Value Date/Time    GLUCOSE 100 (H) 04/02/2022 0221    GLUCOSE 96 04/01/2022 0059    GLUCOSE 153 (H) 03/31/2022 0530     Lab Results   Component Value Date/Time    ASTSGOT 84 (H) 03/30/2022 0545    ASTSGOT 82 (H) 03/29/2022 2000     Lab Results   Component Value Date/Time    ALTSGPT 74 (H) 03/30/2022 0545    ALTSGPT 80 (H) 03/29/2022 2000     Lab Results   Component Value Date/Time    ALKPHOSPHAT 87 03/30/2022 0545    ALKPHOSPHAT 94 03/29/2022 2000     Lab Results   Component Value Date/Time    HBA1C 9.3 (H)  03/31/2022 0530    HBA1C 9.3 (H) 03/30/2022 0545     Lab Results   Component Value Date/Time    NTPROBNP 1335 (H) 04/03/2022 0429    NTPROBNP 1466 (H) 04/02/2022 0221    NTPROBNP 2090 (H) 04/01/2022 0059     Lab Results   Component Value Date/Time    TROPONINT 943 (H) 03/30/2022 0235    TROPONINT 887 (H) 03/29/2022 2229    TROPONINT 768 (H) 03/29/2022 2000     Blood Counts  Lab Results   Component Value Date/Time    HEMOGLOBIN 12.7 (L) 04/03/2022 0429    HEMOGLOBIN 11.7 (L) 04/02/2022 0221    HEMOGLOBIN 12.3 (L) 04/01/2022 0059     Lab Results   Component Value Date/Time    PLATELETCT 285 04/03/2022 0429    PLATELETCT 224 04/02/2022 0221    PLATELETCT 229 04/01/2022 0059     Lab Results   Component Value Date/Time    WBC 5.8 04/03/2022 0429    WBC 5.4 04/02/2022 0221    WBC 8.2 04/01/2022 0059      Physical Examination:   General: Well-appearing, no acute distress  Eyes: Extraocular movements intact, anicteric  Neck: Supple, full range of motion, no jugular venous distension  Pulmonary: Normal respiratory effort, no distress  Cardiovascular: Regular rate and rhythm  Gastrointestinal: Slightly distended  Extremities: Warm and well perfused, no lower extremity edema  Neurological: Alert and oriented, no gross focal motor deficits     Past History:   Past Medical History  The patient's past medical history was reviewed.  See HPI and self-reported patient medical history form for pertinent medical history to consultation.    Past Social History  The patient's social history was reviewed.  See [facility] self-reported patient medical history form for pertinent social history to consultation.    Past Family History  The patient's family history was reviewed.  See [facility] self-reported patient medical history form for pertinent family history to consultation.    Review of Systems  A pertinent cardiac review of systems was performed and was otherwise unremarkable except as per HPI and self-reported patient medical history  form.        Vinh Arevalo MD, [facility]  Interventional Cardiology  [facility], [address]  [address] [address]  Phone: [phone]  Fax: [phone]

## 2022-04-29 NOTE — TELEPHONE ENCOUNTER
ISREAL Zarate from [facility] called in and wanted to see if ISREAL still wanted the pt to stop losartan and start the medication sacubitril-valsartan (ENTRESTO)  MG Tab tablet.    In addition, if the medication sacubitril-valsartan (ENTRESTO)  MG Tab tablet is to be started, Elliot from [facility], is needing clarification on the dosage of this medication please.    Best contact for Elliot at [facility]  PH: [phone]      Thank you,  Suki VALENTINE

## 2022-05-10 ENCOUNTER — PATIENT MESSAGE (OUTPATIENT)
Dept: CARDIOLOGY | Facility: MEDICAL CENTER | Age: 67
End: 2022-05-10
Payer: COMMERCIAL

## 2022-05-10 NOTE — PROGRESS NOTES
Labs look stable. Continue current doses of Entresto and spironolactone (can you confirm that he started Entresto?).    Can you see if he has been taking his home BP and HR. If home SBP >100 and HR >60, then have him increase his carvedilol to 12.5 mg bid.

## 2022-05-11 ENCOUNTER — PATIENT MESSAGE (OUTPATIENT)
Dept: CARDIOLOGY | Facility: MEDICAL CENTER | Age: 67
End: 2022-05-11
Payer: COMMERCIAL

## 2022-05-11 DIAGNOSIS — I50.33 CHF (CONGESTIVE HEART FAILURE), NYHA CLASS I, ACUTE ON CHRONIC, DIASTOLIC (HCC): ICD-10-CM

## 2022-05-12 RX ORDER — CARVEDILOL 12.5 MG/1
12.5 TABLET ORAL 2 TIMES DAILY WITH MEALS
Qty: 90 TABLET | Refills: 3 | Status: SHIPPED | OUTPATIENT
Start: 2022-05-12 | End: 2022-06-01 | Stop reason: SDUPTHER

## 2022-05-12 NOTE — PATIENT COMMUNICATION
Medication sent to preferred pharmacy as requested.    Replied to pt via [employer] regarding findings.

## 2022-05-18 ENCOUNTER — PATIENT MESSAGE (OUTPATIENT)
Dept: CARDIOLOGY | Facility: MEDICAL CENTER | Age: 67
End: 2022-05-18
Payer: COMMERCIAL

## 2022-06-01 ENCOUNTER — OFFICE VISIT (OUTPATIENT)
Dept: CARDIOLOGY | Facility: MEDICAL CENTER | Age: 67
End: 2022-06-01
Payer: COMMERCIAL

## 2022-06-01 VITALS
WEIGHT: 175 LBS | BODY MASS INDEX: 28.12 KG/M2 | SYSTOLIC BLOOD PRESSURE: 124 MMHG | DIASTOLIC BLOOD PRESSURE: 64 MMHG | HEIGHT: 66 IN | OXYGEN SATURATION: 95 % | HEART RATE: 74 BPM | RESPIRATION RATE: 16 BRPM

## 2022-06-01 DIAGNOSIS — I10 PRIMARY HYPERTENSION: ICD-10-CM

## 2022-06-01 DIAGNOSIS — I50.20 HFREF (HEART FAILURE WITH REDUCED EJECTION FRACTION) (HCC): ICD-10-CM

## 2022-06-01 DIAGNOSIS — I25.84 CORONARY ARTERY DISEASE DUE TO CALCIFIED CORONARY LESION: ICD-10-CM

## 2022-06-01 DIAGNOSIS — I25.10 CORONARY ARTERY DISEASE DUE TO CALCIFIED CORONARY LESION: ICD-10-CM

## 2022-06-01 DIAGNOSIS — E78.2 MIXED HYPERLIPIDEMIA: ICD-10-CM

## 2022-06-01 PROBLEM — E11.9 TYPE 2 DIABETES MELLITUS (HCC): Status: ACTIVE | Noted: 2022-06-01

## 2022-06-01 PROBLEM — E11.65 UNCONTROLLED TYPE 2 DIABETES MELLITUS WITH HYPERGLYCEMIA (HCC): Status: RESOLVED | Noted: 2022-04-05 | Resolved: 2022-06-01

## 2022-06-01 PROCEDURE — 99214 OFFICE O/P EST MOD 30 MIN: CPT | Performed by: INTERNAL MEDICINE

## 2022-06-01 RX ORDER — CARVEDILOL 25 MG/1
25 TABLET ORAL 2 TIMES DAILY WITH MEALS
Qty: 90 TABLET | Refills: 3 | Status: ON HOLD | OUTPATIENT
Start: 2022-06-01 | End: 2022-06-23

## 2022-06-01 RX ORDER — SPIRONOLACTONE 25 MG/1
25 TABLET ORAL DAILY
Qty: 90 TABLET | Refills: 3 | Status: SHIPPED | OUTPATIENT
Start: 2022-06-01 | End: 2022-06-01 | Stop reason: SDUPTHER

## 2022-06-01 RX ORDER — SPIRONOLACTONE 25 MG/1
25 TABLET ORAL DAILY
Qty: 90 TABLET | Refills: 3 | Status: SHIPPED | OUTPATIENT
Start: 2022-06-01 | End: 2022-07-13 | Stop reason: SDUPTHER

## 2022-06-01 RX ORDER — CLOPIDOGREL BISULFATE 75 MG/1
75 TABLET ORAL DAILY
Qty: 90 TABLET | Refills: 3 | Status: SHIPPED | OUTPATIENT
Start: 2022-06-01 | End: 2023-03-03 | Stop reason: SDUPTHER

## 2022-06-01 ASSESSMENT — FIBROSIS 4 INDEX: FIB4 SCORE: 2.23

## 2022-06-01 NOTE — PROGRESS NOTES
CARDIOLOGY CONSULTATION NOTE      Date of Visit: 6/1/2022    [facility]    Patient Name: Tucker Frederick    YOB: 1955  MRN: [id number]     Reason for Visit:   Follow-up     Patient Story:   Tucker Frederick is a 66 year-old gentleman with a past medical history of heart failure with reduced ejection fraction, severe coronary artery disease ( of RCA and OM2), type 2 diabetes, hypertension, dyslipidemia, prior methamphetamine abuse, and alcohol abuse. Briefly, he was recently admitted to [facility] with worsening shortness of breath and abdominal distention.  He was found to have newly diagnosed left ventricular systolic dysfunction as well as a significantly elevated NT-proBNP and troponin.  He underwent coronary angiography to further evaluate his abnormalities, which demonstrated chronic occlusions of the mid RCA and ostial OM2.  He was evaluated by CT Surgery and was not felt to be a surgical candidate.  After diuresis and initiation of a neurohormonal regimen, he was discharged with Cardiology follow-up.    At his initial clinic visit, he reported feeling much better with no concerning weight gain.  He denied recurrent methamphetamine use and reported that he had reduced his alcoholic drinks daily to about 3-5.  His Lasix dose was reduced and his spironolactone and carvedilol doses were increased.  He was also transitioned to Entresto with stable follow-up laboratories.     Interval History:   He presents for follow-up today and notes that he feels very well.  He has not noticed increased weight or lower extremity edema after reducing his Lasix dose.  His home blood pressure log shows systolic blood pressures generally in the 110s and heart rates generally in the 70s.     Medications and Allergies:     Current Outpatient Medications   Medication Sig Dispense Refill   • carvedilol (COREG) 12.5 MG Tab Take 1 Tablet by mouth 2 times a day with meals. 90 Tablet 3   • Cyanocobalamin  (VITAMIN B 12 PO) Take  by mouth.     • sacubitril-valsartan (ENTRESTO)  MG Tab tablet Take 1 Tablet by mouth 2 times a day. 180 Tablet 3   • Semaglutide,0.25 or 0.5MG/DOS, 2 MG/1.5ML Solution Pen-injector      • Multiple Vitamin (MULTIVITAMIN ADULT PO) Take  by mouth.     • atorvastatin (LIPITOR) 80 MG tablet Take 1 Tablet by mouth every evening. 30 Tablet 0   • clopidogrel (PLAVIX) 75 MG Tab Take 1 Tablet by mouth every day. 30 Tablet 0   • furosemide (LASIX) 40 MG Tab Take 1 Tablet by mouth every day. (Patient taking differently: Take 20 mg by mouth every day.) 30 Tablet 0   • spironolactone (ALDACTONE) 25 MG Tab Take 0.5 Tablet by mouth every day. (Patient taking differently: Take 25 mg by mouth every day.) 30 Tablet 3   • Empagliflozin 25 MG Tab Take 25 mg by mouth every day.     • ferrous gluconate (FERGON) 324 (38 Fe) MG Tab Take 324 mg by mouth every morning with breakfast.     • traZODone (DESYREL) 50 MG Tab Take 50 mg by mouth every evening.     • metformin (GLUCOPHAGE) 1000 MG tablet Take 1,000 mg by mouth 2 times a day with meals.     • Semaglutide (OZEMPIC, 0.25 OR 0.5 MG/DOSE, SC) Inject  under the skin.     • aspirin 81 MG EC tablet Take 1 Tablet by mouth every day. (Patient not taking: No sig reported) 30 Tablet 0     No current facility-administered medications for this visit.     No Known Allergies     Medical Decision Making:   # Heart failure with reduced ejection fraction: This is likely multifactorial from ischemic disease as well as polysubstance abuse.  He continues to appear euvolemic today.  He has a cardiac MRI pending in 1 month.  Depending on his symptoms, left ventricular systolic function, and viability,  PCI and/or ICD may be considered.  -Continue Entresto  twice daily  -Continue spironolactone to 25 mg daily  -Increase carvedilol to 25 mg twice daily  -Trial of discontinuing furosemide  -Cardiac MRI in 1 month    # Severe coronary artery disease ( mid RCA,   "ostial OM2): See above for cardiac MRI and revascularization plan.  His LDL is well controlled on moderate dose atorvastatin.  -Continue atorvastatin 40 mg nightly  -Continue clopidogrel 75 mg daily    # Hypertension: Well-controlled today, see above for medical management.    # Mixed hyperlipidemia: See above for medical management.    # Type 2 diabetes: Defer glucose control to PCP.  He has already been started on an SGL-2 inhibitor and a GLP-1 agonist    # History of polysubstance abuse: He reports stopping methamphetamine use and significantly decreasing his alcohol use.  He was previously instructed to work towards a goal alcohol intake of 1-2 drinks daily.    Follow Up  6 weeks     Cardiac Studies and Procedures:   Echocardiography  TTE (3/30/2021)  Normal left ventricular chamber size.  Moderately reduced left ventricular systolic function.  The left ventricular ejection fraction is visually estimated to be 30-35%.  Global hypokinesis with severe hypokinesis basal inferolateral wall.  Probably mildly dilated right venricular with reduced right ventricular systolic function and hypokinetic right ventricular free wall.  No valvular abnormalities.     Coronary Angiography/Cardiac Catheterization  CAG (4/1/2022)  LM: Patent  LAD: Proximal 20%, mid 30%, mid-distal 50%  LCx: Proximal 20%, ostial OM1 50%, ostial  OM2  RCA: Proximal 50%, mid      Vital Signs:   /64 (BP Location: Left arm, Patient Position: Sitting)   Pulse 74   Resp 16   Ht 1.676 m (5' 6\")   Wt 79.4 kg (175 lb)   SpO2 95%    BP Readings from Last 4 Encounters:   06/01/22 124/64   04/29/22 124/70   04/05/22 102/52   04/03/22 137/76     Wt Readings from Last 4 Encounters:   06/01/22 79.4 kg (175 lb)   04/29/22 80.7 kg (178 lb)   04/05/22 81.6 kg (180 lb)   04/03/22 81.1 kg (178 lb 12.7 oz)     Body mass index is 28.25 kg/m².     Laboratories:   [facility] (5/8/2022)  Glucose 169  BUN 16  Cr 1.1  Na 138  K 4.2     [facility] " (4/15/2022)  Glucose 214  BUN 26  Cr 1.2  Na 133  K 4.4  GFR 67  HDL 37  LDL 57    Total 118    Lipids  Lab Results   Component Value Date/Time    LDL 64 03/31/2022 0530     (H) 03/30/2022 0545     Lab Results   Component Value Date/Time    HDL 59 03/31/2022 0530    HDL 71 03/30/2022 0545       Lab Results   Component Value Date/Time    TRIGLYCERIDE 178 (H) 03/31/2022 0530    TRIGLYCERIDE 177 (H) 03/30/2022 0545       Lab Results   Component Value Date/Time    CHOLSTRLTOT 159 03/31/2022 0530    CHOLSTRLTOT 207 (H) 03/30/2022 0545     Chemistries  Lab Results   Component Value Date/Time    CREATININE 0.88 04/02/2022 0221    CREATININE 0.80 04/01/2022 0059    CREATININE 0.97 03/31/2022 0530    CREATININE 0.86 03/30/2022 2200    CREATININE 0.95 03/30/2022 1032     Lab Results   Component Value Date/Time    BUN 26 (H) 04/02/2022 0221    BUN 27 (H) 04/01/2022 0059    BUN 27 (H) 03/31/2022 0530    BUN 23 (H) 03/30/2022 2200    BUN 24 (H) 03/30/2022 1032     Lab Results   Component Value Date/Time    POTASSIUM 3.8 04/02/2022 0221    POTASSIUM 3.6 04/01/2022 0059    POTASSIUM 3.8 03/31/2022 0530     Lab Results   Component Value Date/Time    SODIUM 141 04/02/2022 0221    SODIUM 141 04/01/2022 0059    SODIUM 141 03/31/2022 0530     Lab Results   Component Value Date/Time    GLUCOSE 100 (H) 04/02/2022 0221    GLUCOSE 96 04/01/2022 0059    GLUCOSE 153 (H) 03/31/2022 0530     Lab Results   Component Value Date/Time    ASTSGOT 84 (H) 03/30/2022 0545    ASTSGOT 82 (H) 03/29/2022 2000     Lab Results   Component Value Date/Time    ALTSGPT 74 (H) 03/30/2022 0545    ALTSGPT 80 (H) 03/29/2022 2000     Lab Results   Component Value Date/Time    ALKPHOSPHAT 87 03/30/2022 0545    ALKPHOSPHAT 94 03/29/2022 2000     Lab Results   Component Value Date/Time    HBA1C 9.3 (H) 03/31/2022 0530    HBA1C 9.3 (H) 03/30/2022 0545     Lab Results   Component Value Date/Time    NTPROBNP 1335 (H) 04/03/2022 0429    NTPROBNP 1466 (H)  04/02/2022 0221    NTPROBNP 2090 (H) 04/01/2022 0059     Lab Results   Component Value Date/Time    TROPONINT 943 (H) 03/30/2022 0235    TROPONINT 887 (H) 03/29/2022 2229    TROPONINT 768 (H) 03/29/2022 2000     Blood Counts  Lab Results   Component Value Date/Time    HEMOGLOBIN 12.7 (L) 04/03/2022 0429    HEMOGLOBIN 11.7 (L) 04/02/2022 0221    HEMOGLOBIN 12.3 (L) 04/01/2022 0059     Lab Results   Component Value Date/Time    PLATELETCT 285 04/03/2022 0429    PLATELETCT 224 04/02/2022 0221    PLATELETCT 229 04/01/2022 0059     Lab Results   Component Value Date/Time    WBC 5.8 04/03/2022 0429    WBC 5.4 04/02/2022 0221    WBC 8.2 04/01/2022 0059      Physical Examination:   General: Well-appearing, no acute distress  Eyes: Extraocular movements intact, anicteric  Neck: Supple, full range of motion, no jugular venous distension  Pulmonary: Normal respiratory effort, no distress  Cardiovascular: Regular rate and rhythm  Gastrointestinal: Slightly distended  Extremities: Warm and well perfused, no lower extremity edema  Neurological: Alert and oriented, no gross focal motor deficits     Past History:   Past Medical History  The patient's past medical history was reviewed.  See HPI and self-reported patient medical history form for pertinent medical history to consultation.    Past Social History  The patient's social history was reviewed.  See \[facility]\"" self-reported patient medical history form for pertinent social history to consultation.    Past Family History  The patient's family history was reviewed.  See \[facility]\"" self-reported patient medical history form for pertinent family history to consultation.    Review of Systems  A pertinent cardiac review of systems was performed and was otherwise unremarkable except as per HPI and self-reported patient medical history form.        Vinh Arevalo MD, [facility]  Interventional Cardiology  [facility] V[facility] Medicine, [address]  [address]  [place] [address]  [place] [address]  Phone: [phone]  Fax: [phone]

## 2022-06-01 NOTE — PATIENT INSTRUCTIONS
Try stopping furosemide (Lasix), restart if you gain weight or have leg swelling  Increase carvedilol to 25 mg twice daily total dose (okay to take two 12.5 mg tablets)  Continue to follow HR and BP a few times weekly  Schedule cardiac MRI  Follow up after cardiac MRI

## 2022-06-19 ENCOUNTER — HOSPITAL ENCOUNTER (INPATIENT)
Facility: MEDICAL CENTER | Age: 67
LOS: 3 days | DRG: 896 | End: 2022-06-23
Attending: STUDENT IN AN ORGANIZED HEALTH CARE EDUCATION/TRAINING PROGRAM | Admitting: INTERNAL MEDICINE
Payer: COMMERCIAL

## 2022-06-19 ENCOUNTER — APPOINTMENT (OUTPATIENT)
Dept: RADIOLOGY | Facility: MEDICAL CENTER | Age: 67
DRG: 896 | End: 2022-06-19
Attending: STUDENT IN AN ORGANIZED HEALTH CARE EDUCATION/TRAINING PROGRAM
Payer: COMMERCIAL

## 2022-06-19 DIAGNOSIS — J81.0 ACUTE PULMONARY EDEMA (HCC): ICD-10-CM

## 2022-06-19 DIAGNOSIS — J96.01 ACUTE RESPIRATORY FAILURE WITH HYPOXIA (HCC): ICD-10-CM

## 2022-06-19 DIAGNOSIS — F15.10 METHAMPHETAMINE USE (HCC): ICD-10-CM

## 2022-06-19 DIAGNOSIS — I50.20 HFREF (HEART FAILURE WITH REDUCED EJECTION FRACTION) (HCC): ICD-10-CM

## 2022-06-19 DIAGNOSIS — I50.9 ACUTE ON CHRONIC CONGESTIVE HEART FAILURE, UNSPECIFIED HEART FAILURE TYPE (HCC): ICD-10-CM

## 2022-06-19 DIAGNOSIS — E87.5 ACUTE HYPERKALEMIA: ICD-10-CM

## 2022-06-19 LAB
ALBUMIN SERPL BCP-MCNC: 4.6 G/DL (ref 3.2–4.9)
ALBUMIN/GLOB SERPL: 1.5 G/DL
ALP SERPL-CCNC: 91 U/L (ref 30–99)
ALT SERPL-CCNC: 34 U/L (ref 2–50)
ANION GAP SERPL CALC-SCNC: 22 MMOL/L (ref 7–16)
AST SERPL-CCNC: 56 U/L (ref 12–45)
BASOPHILS # BLD AUTO: 0 % (ref 0–1.8)
BASOPHILS # BLD: 0 K/UL (ref 0–0.12)
BILIRUB SERPL-MCNC: 0.6 MG/DL (ref 0.1–1.5)
BUN SERPL-MCNC: 31 MG/DL (ref 8–22)
CALCIUM SERPL-MCNC: 9.1 MG/DL (ref 8.5–10.5)
CHLORIDE SERPL-SCNC: 97 MMOL/L (ref 96–112)
CO2 SERPL-SCNC: 17 MMOL/L (ref 20–33)
CREAT SERPL-MCNC: 1.63 MG/DL (ref 0.5–1.4)
EKG IMPRESSION: NORMAL
EOSINOPHIL # BLD AUTO: 0 K/UL (ref 0–0.51)
EOSINOPHIL NFR BLD: 0 % (ref 0–6.9)
ERYTHROCYTE [DISTWIDTH] IN BLOOD BY AUTOMATED COUNT: 44 FL (ref 35.9–50)
GFR SERPLBLD CREATININE-BSD FMLA CKD-EPI: 46 ML/MIN/1.73 M 2
GLOBULIN SER CALC-MCNC: 3.1 G/DL (ref 1.9–3.5)
GLUCOSE SERPL-MCNC: 381 MG/DL (ref 65–99)
HCT VFR BLD AUTO: 44.1 % (ref 42–52)
HGB BLD-MCNC: 14.8 G/DL (ref 14–18)
LYMPHOCYTES # BLD AUTO: 0.84 K/UL (ref 1–4.8)
LYMPHOCYTES NFR BLD: 4.4 % (ref 22–41)
MANUAL DIFF BLD: NORMAL
MCH RBC QN AUTO: 31.4 PG (ref 27–33)
MCHC RBC AUTO-ENTMCNC: 33.6 G/DL (ref 33.7–35.3)
MCV RBC AUTO: 93.6 FL (ref 81.4–97.8)
MONOCYTES # BLD AUTO: 0.83 K/UL (ref 0–0.85)
MONOCYTES NFR BLD AUTO: 4.3 % (ref 0–13.4)
MORPHOLOGY BLD-IMP: NORMAL
MYELOCYTES NFR BLD MANUAL: 0.9 %
NEUTROPHILS # BLD AUTO: 17.36 K/UL (ref 1.82–7.42)
NEUTROPHILS NFR BLD: 90.4 % (ref 44–72)
NRBC # BLD AUTO: 0 K/UL
NRBC BLD-RTO: 0 /100 WBC
NT-PROBNP SERPL IA-MCNC: ABNORMAL PG/ML (ref 0–125)
PLATELET # BLD AUTO: 531 K/UL (ref 164–446)
PLATELET BLD QL SMEAR: NORMAL
PMV BLD AUTO: 9.8 FL (ref 9–12.9)
POTASSIUM SERPL-SCNC: 6.2 MMOL/L (ref 3.6–5.5)
PROT SERPL-MCNC: 7.7 G/DL (ref 6–8.2)
RBC # BLD AUTO: 4.71 M/UL (ref 4.7–6.1)
RBC BLD AUTO: NORMAL
SODIUM SERPL-SCNC: 136 MMOL/L (ref 135–145)
TROPONIN T SERPL-MCNC: 389 NG/L (ref 6–19)
WBC # BLD AUTO: 19.2 K/UL (ref 4.8–10.8)

## 2022-06-19 PROCEDURE — 85007 BL SMEAR W/DIFF WBC COUNT: CPT

## 2022-06-19 PROCEDURE — 80053 COMPREHEN METABOLIC PANEL: CPT

## 2022-06-19 PROCEDURE — 36415 COLL VENOUS BLD VENIPUNCTURE: CPT

## 2022-06-19 PROCEDURE — 93005 ELECTROCARDIOGRAM TRACING: CPT

## 2022-06-19 PROCEDURE — 700102 HCHG RX REV CODE 250 W/ 637 OVERRIDE(OP): Performed by: STUDENT IN AN ORGANIZED HEALTH CARE EDUCATION/TRAINING PROGRAM

## 2022-06-19 PROCEDURE — 94660 CPAP INITIATION&MGMT: CPT

## 2022-06-19 PROCEDURE — 84484 ASSAY OF TROPONIN QUANT: CPT

## 2022-06-19 PROCEDURE — 85025 COMPLETE CBC W/AUTO DIFF WBC: CPT

## 2022-06-19 PROCEDURE — 83880 ASSAY OF NATRIURETIC PEPTIDE: CPT

## 2022-06-19 PROCEDURE — A9270 NON-COVERED ITEM OR SERVICE: HCPCS | Performed by: STUDENT IN AN ORGANIZED HEALTH CARE EDUCATION/TRAINING PROGRAM

## 2022-06-19 PROCEDURE — 93005 ELECTROCARDIOGRAM TRACING: CPT | Performed by: STUDENT IN AN ORGANIZED HEALTH CARE EDUCATION/TRAINING PROGRAM

## 2022-06-19 PROCEDURE — 83036 HEMOGLOBIN GLYCOSYLATED A1C: CPT

## 2022-06-19 PROCEDURE — 99285 EMERGENCY DEPT VISIT HI MDM: CPT

## 2022-06-19 PROCEDURE — C9803 HOPD COVID-19 SPEC COLLECT: HCPCS | Performed by: STUDENT IN AN ORGANIZED HEALTH CARE EDUCATION/TRAINING PROGRAM

## 2022-06-19 RX ORDER — ONDANSETRON 2 MG/ML
8 INJECTION INTRAMUSCULAR; INTRAVENOUS ONCE
Status: COMPLETED | OUTPATIENT
Start: 2022-06-20 | End: 2022-06-20

## 2022-06-19 RX ORDER — DEXTROSE MONOHYDRATE 25 G/50ML
25 INJECTION, SOLUTION INTRAVENOUS ONCE
Status: COMPLETED | OUTPATIENT
Start: 2022-06-19 | End: 2022-06-20

## 2022-06-19 RX ORDER — ASPIRIN 81 MG/1
81 TABLET, CHEWABLE ORAL ONCE
Status: COMPLETED | OUTPATIENT
Start: 2022-06-19 | End: 2022-06-19

## 2022-06-19 RX ORDER — CALCIUM GLUCONATE 20 MG/ML
2 INJECTION, SOLUTION INTRAVENOUS ONCE
Status: COMPLETED | OUTPATIENT
Start: 2022-06-19 | End: 2022-06-20

## 2022-06-19 RX ADMIN — ASPIRIN 81 MG 81 MG: 81 TABLET ORAL at 22:54

## 2022-06-19 ASSESSMENT — ENCOUNTER SYMPTOMS
ABDOMINAL PAIN: 0
DIZZINESS: 1
DOUBLE VISION: 0
NAUSEA: 1
CHILLS: 0
HEADACHES: 0
VOMITING: 0
COUGH: 0
FEVER: 0
SHORTNESS OF BREATH: 1
BLURRED VISION: 0
SORE THROAT: 0
FALLS: 0
DIAPHORESIS: 1
LOSS OF CONSCIOUSNESS: 0
NECK PAIN: 0

## 2022-06-19 ASSESSMENT — PULMONARY FUNCTION TESTS: EPAP_CMH2O: 8

## 2022-06-19 ASSESSMENT — FIBROSIS 4 INDEX: FIB4 SCORE: 2.23

## 2022-06-20 PROBLEM — I21.4 NSTEMI (NON-ST ELEVATED MYOCARDIAL INFARCTION) (HCC): Status: ACTIVE | Noted: 2022-06-20

## 2022-06-20 PROBLEM — E87.5 HYPERKALEMIA: Status: ACTIVE | Noted: 2022-06-20

## 2022-06-20 PROBLEM — D72.829 LEUKOCYTOSIS: Status: ACTIVE | Noted: 2022-06-20

## 2022-06-20 PROBLEM — N17.9 ACUTE RENAL FAILURE (ARF) (HCC): Status: ACTIVE | Noted: 2022-06-20

## 2022-06-20 LAB
AMPHET UR QL SCN: POSITIVE
APPEARANCE UR: CLEAR
APTT PPP: 24.5 SEC (ref 24.7–36)
BACTERIA #/AREA URNS HPF: NEGATIVE /HPF
BARBITURATES UR QL SCN: NEGATIVE
BENZODIAZ UR QL SCN: NEGATIVE
BILIRUB UR QL STRIP.AUTO: NEGATIVE
BZE UR QL SCN: NEGATIVE
CANNABINOIDS UR QL SCN: NEGATIVE
COLOR UR: YELLOW
EPI CELLS #/AREA URNS HPF: NEGATIVE /HPF
EST. AVERAGE GLUCOSE BLD GHB EST-MCNC: 186 MG/DL
GLUCOSE BLD STRIP.AUTO-MCNC: 165 MG/DL (ref 65–99)
GLUCOSE BLD STRIP.AUTO-MCNC: 198 MG/DL (ref 65–99)
GLUCOSE BLD STRIP.AUTO-MCNC: 203 MG/DL (ref 65–99)
GLUCOSE BLD STRIP.AUTO-MCNC: 216 MG/DL (ref 65–99)
GLUCOSE BLD STRIP.AUTO-MCNC: 328 MG/DL (ref 65–99)
GLUCOSE BLD STRIP.AUTO-MCNC: 446 MG/DL (ref 65–99)
GLUCOSE UR STRIP.AUTO-MCNC: >=1000 MG/DL
HBA1C MFR BLD: 8.1 % (ref 4–5.6)
HYALINE CASTS #/AREA URNS LPF: ABNORMAL /LPF
INR PPP: 1.06 (ref 0.87–1.13)
KETONES UR STRIP.AUTO-MCNC: ABNORMAL MG/DL
LEUKOCYTE ESTERASE UR QL STRIP.AUTO: NEGATIVE
MAGNESIUM SERPL-MCNC: 2.1 MG/DL (ref 1.5–2.5)
METHADONE UR QL SCN: NEGATIVE
MICRO URNS: ABNORMAL
NITRITE UR QL STRIP.AUTO: NEGATIVE
OPIATES UR QL SCN: NEGATIVE
OXYCODONE UR QL SCN: NEGATIVE
PCP UR QL SCN: NEGATIVE
PH UR STRIP.AUTO: 5 [PH] (ref 5–8)
PHOSPHATE SERPL-MCNC: 4.9 MG/DL (ref 2.5–4.5)
POTASSIUM SERPL-SCNC: 5.7 MMOL/L (ref 3.6–5.5)
PROPOXYPH UR QL SCN: NEGATIVE
PROT UR QL STRIP: NEGATIVE MG/DL
PROTHROMBIN TIME: 13.5 SEC (ref 12–14.6)
RBC # URNS HPF: ABNORMAL /HPF
RBC UR QL AUTO: ABNORMAL
SP GR UR STRIP.AUTO: 1.02
TROPONIN T SERPL-MCNC: 402 NG/L (ref 6–19)
TROPONIN T SERPL-MCNC: 406 NG/L (ref 6–19)
TROPONIN T SERPL-MCNC: 407 NG/L (ref 6–19)
UFH PPP CHRO-ACNC: 0.16 IU/ML
UFH PPP CHRO-ACNC: <0.1 IU/ML
UROBILINOGEN UR STRIP.AUTO-MCNC: 0.2 MG/DL
WBC #/AREA URNS HPF: ABNORMAL /HPF

## 2022-06-20 PROCEDURE — 84100 ASSAY OF PHOSPHORUS: CPT

## 2022-06-20 PROCEDURE — 85520 HEPARIN ASSAY: CPT

## 2022-06-20 PROCEDURE — 700102 HCHG RX REV CODE 250 W/ 637 OVERRIDE(OP): Performed by: INTERNAL MEDICINE

## 2022-06-20 PROCEDURE — A9270 NON-COVERED ITEM OR SERVICE: HCPCS | Performed by: INTERNAL MEDICINE

## 2022-06-20 PROCEDURE — 82962 GLUCOSE BLOOD TEST: CPT | Mod: 91

## 2022-06-20 PROCEDURE — 96375 TX/PRO/DX INJ NEW DRUG ADDON: CPT

## 2022-06-20 PROCEDURE — 96367 TX/PROPH/DG ADDL SEQ IV INF: CPT

## 2022-06-20 PROCEDURE — 96376 TX/PRO/DX INJ SAME DRUG ADON: CPT

## 2022-06-20 PROCEDURE — 84132 ASSAY OF SERUM POTASSIUM: CPT

## 2022-06-20 PROCEDURE — 84484 ASSAY OF TROPONIN QUANT: CPT | Mod: 91

## 2022-06-20 PROCEDURE — 96372 THER/PROPH/DIAG INJ SC/IM: CPT

## 2022-06-20 PROCEDURE — 96365 THER/PROPH/DIAG IV INF INIT: CPT

## 2022-06-20 PROCEDURE — 81001 URINALYSIS AUTO W/SCOPE: CPT

## 2022-06-20 PROCEDURE — 71045 X-RAY EXAM CHEST 1 VIEW: CPT

## 2022-06-20 PROCEDURE — 80307 DRUG TEST PRSMV CHEM ANLYZR: CPT

## 2022-06-20 PROCEDURE — 700111 HCHG RX REV CODE 636 W/ 250 OVERRIDE (IP): Performed by: INTERNAL MEDICINE

## 2022-06-20 PROCEDURE — 36415 COLL VENOUS BLD VENIPUNCTURE: CPT

## 2022-06-20 PROCEDURE — 770020 HCHG ROOM/CARE - TELE (206)

## 2022-06-20 PROCEDURE — 85730 THROMBOPLASTIN TIME PARTIAL: CPT

## 2022-06-20 PROCEDURE — 700111 HCHG RX REV CODE 636 W/ 250 OVERRIDE (IP): Performed by: STUDENT IN AN ORGANIZED HEALTH CARE EDUCATION/TRAINING PROGRAM

## 2022-06-20 PROCEDURE — 85610 PROTHROMBIN TIME: CPT

## 2022-06-20 PROCEDURE — 83735 ASSAY OF MAGNESIUM: CPT

## 2022-06-20 PROCEDURE — 700101 HCHG RX REV CODE 250: Performed by: STUDENT IN AN ORGANIZED HEALTH CARE EDUCATION/TRAINING PROGRAM

## 2022-06-20 PROCEDURE — 96366 THER/PROPH/DIAG IV INF ADDON: CPT

## 2022-06-20 PROCEDURE — 99223 1ST HOSP IP/OBS HIGH 75: CPT | Performed by: INTERNAL MEDICINE

## 2022-06-20 RX ORDER — LORAZEPAM 2 MG/1
2 TABLET ORAL
Status: DISCONTINUED | OUTPATIENT
Start: 2022-06-20 | End: 2022-06-22

## 2022-06-20 RX ORDER — HYDROMORPHONE HYDROCHLORIDE 1 MG/ML
0.25 INJECTION, SOLUTION INTRAMUSCULAR; INTRAVENOUS; SUBCUTANEOUS
Status: DISCONTINUED | OUTPATIENT
Start: 2022-06-20 | End: 2022-06-23 | Stop reason: HOSPADM

## 2022-06-20 RX ORDER — POLYETHYLENE GLYCOL 3350 17 G/17G
1 POWDER, FOR SOLUTION ORAL
Status: DISCONTINUED | OUTPATIENT
Start: 2022-06-20 | End: 2022-06-23 | Stop reason: HOSPADM

## 2022-06-20 RX ORDER — LORAZEPAM 2 MG/ML
1.5 INJECTION INTRAMUSCULAR
Status: DISCONTINUED | OUTPATIENT
Start: 2022-06-20 | End: 2022-06-22

## 2022-06-20 RX ORDER — LORAZEPAM 2 MG/ML
0.5 INJECTION INTRAMUSCULAR EVERY 4 HOURS PRN
Status: DISCONTINUED | OUTPATIENT
Start: 2022-06-20 | End: 2022-06-22

## 2022-06-20 RX ORDER — CARVEDILOL 25 MG/1
25 TABLET ORAL 2 TIMES DAILY WITH MEALS
Status: DISCONTINUED | OUTPATIENT
Start: 2022-06-20 | End: 2022-06-20

## 2022-06-20 RX ORDER — OXYCODONE HYDROCHLORIDE 5 MG/1
2.5 TABLET ORAL
Status: DISCONTINUED | OUTPATIENT
Start: 2022-06-20 | End: 2022-06-23 | Stop reason: HOSPADM

## 2022-06-20 RX ORDER — HYDRALAZINE HYDROCHLORIDE 20 MG/ML
10 INJECTION INTRAMUSCULAR; INTRAVENOUS EVERY 4 HOURS PRN
Status: DISCONTINUED | OUTPATIENT
Start: 2022-06-20 | End: 2022-06-23 | Stop reason: HOSPADM

## 2022-06-20 RX ORDER — GAUZE BANDAGE 2" X 2"
100 BANDAGE TOPICAL DAILY
Status: COMPLETED | OUTPATIENT
Start: 2022-06-20 | End: 2022-06-23

## 2022-06-20 RX ORDER — LORAZEPAM 2 MG/ML
2 INJECTION INTRAMUSCULAR
Status: DISCONTINUED | OUTPATIENT
Start: 2022-06-20 | End: 2022-06-22

## 2022-06-20 RX ORDER — FUROSEMIDE 10 MG/ML
40 INJECTION INTRAMUSCULAR; INTRAVENOUS
Status: DISCONTINUED | OUTPATIENT
Start: 2022-06-20 | End: 2022-06-22

## 2022-06-20 RX ORDER — LORAZEPAM 2 MG/1
4 TABLET ORAL
Status: DISCONTINUED | OUTPATIENT
Start: 2022-06-20 | End: 2022-06-22

## 2022-06-20 RX ORDER — HEPARIN SODIUM 5000 [USP'U]/100ML
0-30 INJECTION, SOLUTION INTRAVENOUS CONTINUOUS
Status: DISCONTINUED | OUTPATIENT
Start: 2022-06-20 | End: 2022-06-20

## 2022-06-20 RX ORDER — DEXTROSE MONOHYDRATE 25 G/50ML
25 INJECTION, SOLUTION INTRAVENOUS ONCE
Status: DISCONTINUED | OUTPATIENT
Start: 2022-06-20 | End: 2022-06-20

## 2022-06-20 RX ORDER — LORAZEPAM 2 MG/ML
1 INJECTION INTRAMUSCULAR
Status: DISCONTINUED | OUTPATIENT
Start: 2022-06-20 | End: 2022-06-22

## 2022-06-20 RX ORDER — LORAZEPAM 0.5 MG/1
0.5 TABLET ORAL EVERY 4 HOURS PRN
Status: DISCONTINUED | OUTPATIENT
Start: 2022-06-20 | End: 2022-06-22

## 2022-06-20 RX ORDER — ENOXAPARIN SODIUM 100 MG/ML
40 INJECTION SUBCUTANEOUS DAILY
Status: DISCONTINUED | OUTPATIENT
Start: 2022-06-20 | End: 2022-06-20

## 2022-06-20 RX ORDER — OXYCODONE HYDROCHLORIDE 5 MG/1
5 TABLET ORAL
Status: DISCONTINUED | OUTPATIENT
Start: 2022-06-20 | End: 2022-06-23 | Stop reason: HOSPADM

## 2022-06-20 RX ORDER — AMOXICILLIN 250 MG
2 CAPSULE ORAL 2 TIMES DAILY
Status: DISCONTINUED | OUTPATIENT
Start: 2022-06-20 | End: 2022-06-23 | Stop reason: HOSPADM

## 2022-06-20 RX ORDER — FUROSEMIDE 10 MG/ML
40 INJECTION INTRAMUSCULAR; INTRAVENOUS ONCE
Status: COMPLETED | OUTPATIENT
Start: 2022-06-20 | End: 2022-06-20

## 2022-06-20 RX ORDER — DEXTROSE MONOHYDRATE 25 G/50ML
25 INJECTION, SOLUTION INTRAVENOUS
Status: DISCONTINUED | OUTPATIENT
Start: 2022-06-20 | End: 2022-06-23 | Stop reason: HOSPADM

## 2022-06-20 RX ORDER — LORAZEPAM 1 MG/1
1 TABLET ORAL EVERY 4 HOURS PRN
Status: DISCONTINUED | OUTPATIENT
Start: 2022-06-20 | End: 2022-06-22

## 2022-06-20 RX ORDER — CLOPIDOGREL BISULFATE 75 MG/1
75 TABLET ORAL DAILY
Status: DISCONTINUED | OUTPATIENT
Start: 2022-06-20 | End: 2022-06-23 | Stop reason: HOSPADM

## 2022-06-20 RX ORDER — ATORVASTATIN CALCIUM 80 MG/1
80 TABLET, FILM COATED ORAL EVERY EVENING
Status: DISCONTINUED | OUTPATIENT
Start: 2022-06-20 | End: 2022-06-23 | Stop reason: HOSPADM

## 2022-06-20 RX ORDER — BISACODYL 10 MG
10 SUPPOSITORY, RECTAL RECTAL
Status: DISCONTINUED | OUTPATIENT
Start: 2022-06-20 | End: 2022-06-23 | Stop reason: HOSPADM

## 2022-06-20 RX ORDER — FOLIC ACID 1 MG/1
1 TABLET ORAL DAILY
Status: COMPLETED | OUTPATIENT
Start: 2022-06-20 | End: 2022-06-23

## 2022-06-20 RX ORDER — ACETAMINOPHEN 325 MG/1
650 TABLET ORAL EVERY 6 HOURS PRN
Status: DISCONTINUED | OUTPATIENT
Start: 2022-06-20 | End: 2022-06-23 | Stop reason: HOSPADM

## 2022-06-20 RX ADMIN — THERA TABS 1 TABLET: TAB at 05:18

## 2022-06-20 RX ADMIN — ONDANSETRON HYDROCHLORIDE 8 MG: 2 SOLUTION INTRAMUSCULAR; INTRAVENOUS at 00:01

## 2022-06-20 RX ADMIN — INSULIN HUMAN 3 UNITS: 100 INJECTION, SOLUTION PARENTERAL at 13:24

## 2022-06-20 RX ADMIN — FUROSEMIDE 40 MG: 10 INJECTION, SOLUTION INTRAMUSCULAR; INTRAVENOUS at 17:11

## 2022-06-20 RX ADMIN — INSULIN HUMAN 4 UNITS: 100 INJECTION, SOLUTION PARENTERAL at 00:14

## 2022-06-20 RX ADMIN — SODIUM BICARBONATE 50 MEQ: 84 INJECTION, SOLUTION INTRAVENOUS at 02:06

## 2022-06-20 RX ADMIN — INSULIN HUMAN 2 UNITS: 100 INJECTION, SOLUTION PARENTERAL at 21:50

## 2022-06-20 RX ADMIN — CLOPIDOGREL BISULFATE 75 MG: 75 TABLET ORAL at 05:18

## 2022-06-20 RX ADMIN — INSULIN HUMAN 3 UNITS: 100 INJECTION, SOLUTION PARENTERAL at 08:21

## 2022-06-20 RX ADMIN — SACUBITRIL AND VALSARTAN 1 TABLET: 97; 103 TABLET, FILM COATED ORAL at 05:18

## 2022-06-20 RX ADMIN — DEXTROSE MONOHYDRATE 25 G: 25 INJECTION, SOLUTION INTRAVENOUS at 00:15

## 2022-06-20 RX ADMIN — CALCIUM GLUCONATE 2 G: 20 INJECTION, SOLUTION INTRAVENOUS at 00:15

## 2022-06-20 RX ADMIN — FUROSEMIDE 40 MG: 10 INJECTION, SOLUTION INTRAMUSCULAR; INTRAVENOUS at 02:06

## 2022-06-20 RX ADMIN — FUROSEMIDE 40 MG: 10 INJECTION, SOLUTION INTRAMUSCULAR; INTRAVENOUS at 10:03

## 2022-06-20 RX ADMIN — INSULIN HUMAN 2 UNITS: 100 INJECTION, SOLUTION PARENTERAL at 17:11

## 2022-06-20 RX ADMIN — ATORVASTATIN CALCIUM 80 MG: 80 TABLET, FILM COATED ORAL at 17:11

## 2022-06-20 RX ADMIN — Medication 100 MG: at 05:18

## 2022-06-20 RX ADMIN — FOLIC ACID 1 MG: 1 TABLET ORAL at 05:18

## 2022-06-20 RX ADMIN — HEPARIN SODIUM 12 UNITS/KG/HR: 5000 INJECTION, SOLUTION INTRAVENOUS at 03:12

## 2022-06-20 RX ADMIN — ACETAMINOPHEN 650 MG: 325 TABLET ORAL at 14:37

## 2022-06-20 ASSESSMENT — LIFESTYLE VARIABLES
PAROXYSMAL SWEATS: NO SWEAT VISIBLE
AVERAGE NUMBER OF DAYS PER WEEK YOU HAVE A DRINK CONTAINING ALCOHOL: 7
ANXIETY: MILDLY ANXIOUS
AGITATION: NORMAL ACTIVITY
ORIENTATION AND CLOUDING OF SENSORIUM: ORIENTED AND CAN DO SERIAL ADDITIONS
EVER FELT BAD OR GUILTY ABOUT YOUR DRINKING: NO
ORIENTATION AND CLOUDING OF SENSORIUM: ORIENTED AND CAN DO SERIAL ADDITIONS
ORIENTATION AND CLOUDING OF SENSORIUM: ORIENTED AND CAN DO SERIAL ADDITIONS
TREMOR: TREMOR NOT VISIBLE BUT CAN BE FELT, FINGERTIP TO FINGERTIP
TOTAL SCORE: 3
TOTAL SCORE: 2
TOTAL SCORE: 2
TREMOR: NO TREMOR
HEADACHE, FULLNESS IN HEAD: NOT PRESENT
AUDITORY DISTURBANCES: NOT PRESENT
AGITATION: NORMAL ACTIVITY
TOTAL SCORE: 3
DOES PATIENT WANT TO TALK TO SOMEONE ABOUT QUITTING: NO
HEADACHE, FULLNESS IN HEAD: NOT PRESENT
CONSUMPTION TOTAL: POSITIVE
EVER HAD A DRINK FIRST THING IN THE MORNING TO STEADY YOUR NERVES TO GET RID OF A HANGOVER: YES
HEADACHE, FULLNESS IN HEAD: NOT PRESENT
PAROXYSMAL SWEATS: BARELY PERCEPTIBLE SWEATING. PALMS MOIST
TREMOR: TREMOR NOT VISIBLE BUT CAN BE FELT, FINGERTIP TO FINGERTIP
AGITATION: NORMAL ACTIVITY
VISUAL DISTURBANCES: NOT PRESENT
DOES PATIENT WANT TO STOP DRINKING: YES
TOTAL SCORE: 3
VISUAL DISTURBANCES: NOT PRESENT
ANXIETY: *
VISUAL DISTURBANCES: NOT PRESENT
AGITATION: NORMAL ACTIVITY
ON A TYPICAL DAY WHEN YOU DRINK ALCOHOL HOW MANY DRINKS DO YOU HAVE: 4
NAUSEA AND VOMITING: NO NAUSEA AND NO VOMITING
TREMOR: TREMOR NOT VISIBLE BUT CAN BE FELT, FINGERTIP TO FINGERTIP
TOTAL SCORE: 2
HAVE PEOPLE ANNOYED YOU BY CRITICIZING YOUR DRINKING: YES
ALCOHOL_USE: YES
HOW MANY TIMES IN THE PAST YEAR HAVE YOU HAD 5 OR MORE DRINKS IN A DAY: 300
HAVE YOU EVER FELT YOU SHOULD CUT DOWN ON YOUR DRINKING: YES
PAROXYSMAL SWEATS: NO SWEAT VISIBLE
AUDITORY DISTURBANCES: NOT PRESENT
AUDITORY DISTURBANCES: NOT PRESENT
ANXIETY: MILDLY ANXIOUS
TOTAL SCORE: 4
HEADACHE, FULLNESS IN HEAD: NOT PRESENT
ORIENTATION AND CLOUDING OF SENSORIUM: ORIENTED AND CAN DO SERIAL ADDITIONS
NAUSEA AND VOMITING: NO NAUSEA AND NO VOMITING
VISUAL DISTURBANCES: NOT PRESENT
NAUSEA AND VOMITING: NO NAUSEA AND NO VOMITING
PAROXYSMAL SWEATS: BARELY PERCEPTIBLE SWEATING. PALMS MOIST
ANXIETY: MILDLY ANXIOUS
NAUSEA AND VOMITING: NO NAUSEA AND NO VOMITING
AUDITORY DISTURBANCES: NOT PRESENT

## 2022-06-20 ASSESSMENT — PATIENT HEALTH QUESTIONNAIRE - PHQ9
1. LITTLE INTEREST OR PLEASURE IN DOING THINGS: NOT AT ALL
2. FEELING DOWN, DEPRESSED, IRRITABLE, OR HOPELESS: NOT AT ALL
SUM OF ALL RESPONSES TO PHQ9 QUESTIONS 1 AND 2: 0

## 2022-06-20 ASSESSMENT — COGNITIVE AND FUNCTIONAL STATUS - GENERAL
SUGGESTED CMS G CODE MODIFIER DAILY ACTIVITY: CH
DAILY ACTIVITIY SCORE: 24
SUGGESTED CMS G CODE MODIFIER MOBILITY: CH
MOBILITY SCORE: 24

## 2022-06-20 ASSESSMENT — ENCOUNTER SYMPTOMS
BRUISES/BLEEDS EASILY: 0
NECK PAIN: 0
PALPITATIONS: 0
NAUSEA: 0
INSOMNIA: 0
SHORTNESS OF BREATH: 1
WEIGHT LOSS: 0
STRIDOR: 0
DOUBLE VISION: 0
DEPRESSION: 0
HEADACHES: 0
HEMOPTYSIS: 0
FEVER: 0
ORTHOPNEA: 1
COUGH: 0
DIZZINESS: 0
VOMITING: 0
BLURRED VISION: 0
MYALGIAS: 0
SORE THROAT: 0

## 2022-06-20 ASSESSMENT — FIBROSIS 4 INDEX: FIB4 SCORE: 1.18

## 2022-06-20 ASSESSMENT — PAIN DESCRIPTION - PAIN TYPE
TYPE: ACUTE PAIN
TYPE: ACUTE PAIN

## 2022-06-20 NOTE — ASSESSMENT & PLAN NOTE
Likely secondary to crack cocaine versus cardiorenal.  Avoid nephrotoxic meds and doses,   follow-up urinalysis and repeat BMP  Improving  Will titrate down dose of lasix due to hypotension and clinical improvement  following

## 2022-06-20 NOTE — DISCHARGE PLANNING
Medical Social Work    Referral: Acute Medical Patient    Intervention: Pt is a 66 year old male brought in by [facility] from home for SOB.  Pt is Tucker Frederick (: 1955).  Per medics pt's wife was at home and will be coming in tomorrow.    Plan: SW will follow as needed.

## 2022-06-20 NOTE — ASSESSMENT & PLAN NOTE
Complicated by persistent substance abuse.  Optimize rate and pressure  Repeat echo shows that EF has actually improved to 40%

## 2022-06-20 NOTE — HEART FAILURE PROGRAM
Decompensated of known heart failure with reduced ejection fraction (HFrEF) in the setting of continued methamphetamine use.   EF: 35%  QRSD: 108 ms  EF <35% for greater than 3 months?: It will be on June 30, 2022. However, per Dr. George's note today, patient is not a candidate for any elective invasive cardiovascular procedures until he has demonstrated compliance with f/u and abstinence from amphetamine use.    Discharge Planning  • Residence: local  • Insurance: VA and Medicare  • Referral to disease management program specializing in heart failure care- requested that schedulers notify me if patient cannot be scheduled at the Heart Failure Clinic  • Date of Last Discharge: 4/3/22  • LACE Risk Score: 69  • General Risk Score: 8  I placed an order for SW asking that they look into whether or not patient's insurance offers outpatient care coordination/case management services.  • Opted in for Meds to Beds? yes    Special Clinical Considerations Pertinent to HF    • Pneumococcal vaccine: 4/3/22  • Influenza vaccine: out of season  • Tobacco Status: quit 12 y/a per h/p  • Documentation of tobacco cessation counseling: n/a  • DM dx: yes on atorvastatin and insulin in house. Outpatient on atorvastatin, empagliflozin, and semaglutide   • Atrial arrhythmia dx: no    Nurses: Please document HF education in the education tab and populate the new and improved HF Care Plan. These tools will guide day to day care of the HF patient.    Providers: below are Guideline Directed Medical Therapy (GDMT) for HFrEF. If any cannot be prescribed by discharge, would you please note the clinical reason for each in your discharge summary? Thanks! Denise  • Evidence Based Beta Blocker (bisoprolol, carvedilol, or toprol xl), for EF of 40% or less  • HENNY - I, for EF of 40% or less ARNI is preferred If not cost prohibitive for patient  • SGLT2 inhibitor with proven ASCVD, HF, or DKD benefit Jardiance/empagliflozin): If not cost prohibitive  for patient  • Aldosterone antagonist, for EF of 35% or less, if applicable  • Anticoagulation for atrial arrhythmia, if applicable  • Glycemic control for DM + HF, if applicable  • Lipid lowering medication for DM + HF, if applicable  • Hydralazine Hydrochloride/Isosorbide Dinitrate. The combination of hydralazine and isosorbide- dinitrate is recommended to reduce morbidity and mortality for patients self-described  Americans with NYHA class III-IV HFrEF (EF 40% or less), receiving optimal therapy with ACE inhibitors and beta blockers, unless contraindicated (Class I, JIGNESH: A).  • Pneumococcal vaccine, if not previously received  • Influenza vaccine for current season, if not previously received  • Smoking cessation counseling documented, if applicable  • Device screening, if applicable  • Referral to disease management program specializing in heart failure care- requested that schedulers notify me if patient cannot be scheduled at the Heart Failure Clinic

## 2022-06-20 NOTE — ED NOTES
Patient resting on gurney in position of comfort. Oxymask remains in place. Heparin drip infusing without difficulty. Patient denies any needs at this time. NAD noted.

## 2022-06-20 NOTE — ASSESSMENT & PLAN NOTE
Type II NSTEMI secondary to crack cocaine abuse.  Beta-blocker held.    Aspirin, atorvastatin, nitrates   follow-up serial troponin, EKG and telemetry monitoring  Cardiology consulted, did not recommend intervention due to ongoing methamphetamine abuse

## 2022-06-20 NOTE — CARE PLAN
The patient is Watcher - Medium risk of patient condition declining or worsening    Shift Goals  Clinical Goals: Pt will be hemodynamically stable.  Patient Goals: Rest, comfort  Family Goals: THOM    Progress made toward(s) clinical / shift goals:    Problem: Knowledge Deficit - Standard  Goal: Patient and family/care givers will demonstrate understanding of plan of care, disease process/condition, diagnostic tests and medications  Outcome: Progressing       Patient is not progressing towards the following goals:

## 2022-06-20 NOTE — ED TRIAGE NOTES
"BIBA for SOB. Increased SOB starting today at 1000. Per EMS Pt was 75% on 5L at home (friends O2). EMS gave 40 mg lasix, 0.5\" nitro, Pt also took his own 40mg of lasix. Pt arrived on CPAP.  "

## 2022-06-20 NOTE — PROGRESS NOTES
Cardiology review note:     I was called by Dr. De La Torre regarding this 66-year-old male outpatient of Dr. Vinh Arevalo in cardiology with mixed ischemic and methamphetamine induced cardiomyopathy diabetes hypertension and alcoholism who presents with recurrent methamphetamine/crack cocaine intoxication and acutely decompensated heart failure.  Incidental troponin elevation related to heart failure without evidence of acute coronary syndrome.  Recommend compliance with medical therapy diuresis until euvolemic and absence of alcohol and ongoing methamphetamine abuse.  He is not a candidate for any elective invasive cardiovascular procedures (there were considerations for  interventions with his primary cardiologist) until he has demonstrated compliance with follow-up and abstinence from methamphetamine.     At this time it was deemed no formal in person cardiology consultation was necessary, however if this changes due to changes in patient condition or abnormal test results, please consider formal cardiovascular consultation.    Electronically Signed by:    Aj George MD, FACC, [facility]  Division of Interventional Cardiology  [facility]    6/20/2022  10:37 AM

## 2022-06-20 NOTE — H&P
"Hospital Medicine History & Physical Note    Date of Service  6/20/2022    Primary Care Physician  No primary care provider on file.        Code Status  Full Code    Chief Complaint  Chief Complaint   Patient presents with   • Shortness of Breath     BIBA for SOB. Increased SOB starting today at 1000. Per EMS Pt was 75% on 5L at home. EMS gave 40 mg lasix, 0.5\" nitro, Pt also took his own 40mg of lasix. Pt arrived on CPAP.       History of Presenting Illness  Tucker Frederick is a 66 y.o. male with past medical history of methamphetamine induced cardiomyopathy, diabetes, hypertension and alcohol dependence who presented 6/19/2022 with shortness of breath occurring after smoking crack cocaine.  He reports an acute onset of lightheadedness and collapse.  Prompting a call to EMS.  He is found to have oxygen saturation of 70% and placed on BiPAP.    In the emergency department he is found to have a troponin of 389, a BNP of 1700, potassium 6.2 without peaked T waves, creatinine 1.6, white blood cell count 19.  He receives insulin and glucose, supplemental oxygen, Nitropaste and referred to the hospitalist for admission.  At bedside he is without chest pain or shortness of breath, speaking in full sentences.    I discussed the plan of care with patient and bedside RN.    Review of Systems  Review of Systems   Constitutional: Negative for fever, malaise/fatigue and weight loss.   HENT: Negative for sore throat and tinnitus.    Eyes: Negative for blurred vision and double vision.   Respiratory: Negative for cough, hemoptysis and stridor.    Cardiovascular: Negative for chest pain and palpitations.   Gastrointestinal: Negative for nausea and vomiting.   Genitourinary: Negative for dysuria and urgency.   Musculoskeletal: Negative for myalgias and neck pain.   Skin: Negative for itching and rash.   Neurological: Negative for dizziness and headaches.   Endo/Heme/Allergies: Does not bruise/bleed easily. "   Psychiatric/Behavioral: Negative for depression. The patient does not have insomnia.        Past Medical History   has a past medical history of CHF (congestive heart failure) (HCC), Diabetes (HCC), Hyperlipidemia, Hypertension, and Pulmonary edema.    Surgical History   has no past surgical history on file.     Family History  family history includes Cancer in his mother; Diabetes in his mother; Heart Attack in his father; Heart Disease in his father; Lung Cancer in his father; Multiple Sclerosis in his sister.   Family history reviewed with patient. There is no family history that is pertinent to the chief complaint.     Social History   reports that he quit smoking about 12 years ago. His smoking use included cigarettes. He has a 30.00 pack-year smoking history. He has never used smokeless tobacco. He reports current alcohol use. He reports previous drug use. Drugs: Cocaine and Methamphetamines.    Allergies  No Known Allergies    Medications  Prior to Admission Medications   Prescriptions Last Dose Informant Patient Reported? Taking?   Cyanocobalamin (VITAMIN B 12 PO) 6/19/2022 at AM Patient Yes No   Sig: Take 1 Tablet by mouth every day.   Empagliflozin 25 MG Tab 6/19/2022 at AM Patient Yes No   Sig: Take 25 mg by mouth every day.   Multiple Vitamin (MULTIVITAMIN ADULT PO) 6/19/2022 at AM Patient Yes No   Sig: Take  by mouth.   Semaglutide,0.25 or 0.5MG/DOS, 2 MG/1.5ML Solution Pen-injector 6/13/2022 at [facility] Patient Yes No   atorvastatin (LIPITOR) 80 MG tablet 6/18/2022 at PM Patient No No   Sig: Take 1 Tablet by mouth every evening.   carvedilol (COREG) 25 MG Tab 6/19/2022 at PM Patient No No   Sig: Take 1 Tablet by mouth 2 times a day with meals.   clopidogrel (PLAVIX) 75 MG Tab 6/19/2022 at PM Patient No No   Sig: Take 1 Tablet by mouth every day.   ferrous gluconate (FERGON) 324 (38 Fe) MG Tab 6/19/2022 at AM Patient Yes No   Sig: Take 324 mg by mouth every morning with breakfast.   metformin (GLUCOPHAGE)  1000 MG tablet 6/19/2022 at AM Patient Yes No   Sig: Take 1,000 mg by mouth 2 times a day with meals.   sacubitril-valsartan (ENTRESTO)  MG Tab tablet 6/19/2022 at AM Patient No No   Sig: Take 1 Tablet by mouth 2 times a day.   spironolactone (ALDACTONE) 25 MG Tab 6/19/2022 at AM Patient No No   Sig: Take 1 Tablet by mouth every day.   traZODone (DESYREL) 100 MG Tab 6/18/2022 at PM Patient Yes No   Sig: Take 100 mg by mouth every evening.      Facility-Administered Medications: None       Physical Exam  Temp:  [36.6 °C (97.8 °F)] 36.6 °C (97.8 °F)  Pulse:  [109-119] 109  Resp:  [20-64] 22  BP: (109-129)/(75-88) 128/88  SpO2:  [93 %-98 %] 93 %  Blood Pressure : 128/88   Temperature: 36.6 °C (97.8 °F)   Pulse: (!) 109   Respiration: (!) 22   Pulse Oximetry: 93 %       Physical Exam  Vitals and nursing note reviewed.   Constitutional:       General: He is in acute distress.      Appearance: Normal appearance. He is normal weight. He is not toxic-appearing.   HENT:      Head: Normocephalic and atraumatic.      Nose: Nose normal. No congestion or rhinorrhea.      Mouth/Throat:      Mouth: Mucous membranes are moist.      Pharynx: Oropharynx is clear.   Eyes:      Extraocular Movements: Extraocular movements intact.      Conjunctiva/sclera: Conjunctivae normal.      Pupils: Pupils are equal, round, and reactive to light.   Neck:      Vascular: No carotid bruit.   Cardiovascular:      Rate and Rhythm: Regular rhythm. Tachycardia present.      Pulses: Normal pulses.      Heart sounds: Murmur heard.     Gallop present.   Pulmonary:      Effort: No respiratory distress.      Breath sounds: Rales present. No wheezing.   Abdominal:      General: Abdomen is flat. Bowel sounds are normal. There is no distension.      Palpations: Abdomen is soft. There is no mass.      Tenderness: There is no abdominal tenderness.      Hernia: No hernia is present.   Musculoskeletal:         General: No tenderness or signs of injury.       Cervical back: Normal range of motion and neck supple. No muscular tenderness.   Lymphadenopathy:      Cervical: No cervical adenopathy.   Skin:     Capillary Refill: Capillary refill takes less than 2 seconds.      Coloration: Skin is not jaundiced or pale.      Findings: No bruising.   Neurological:      General: No focal deficit present.      Mental Status: He is alert and oriented to person, place, and time. Mental status is at baseline.      Cranial Nerves: No cranial nerve deficit.      Motor: No weakness.      Coordination: Coordination normal.   Psychiatric:         Mood and Affect: Mood normal.         Thought Content: Thought content normal.         Judgment: Judgment normal.         Laboratory:  Recent Labs     06/19/22 2245   WBC 19.2*   RBC 4.71   HEMOGLOBIN 14.8   HEMATOCRIT 44.1   MCV 93.6   MCH 31.4   MCHC 33.6*   RDW 44.0   PLATELETCT 531*   MPV 9.8     Recent Labs     06/19/22  2245   SODIUM 136   POTASSIUM 6.2*   CHLORIDE 97   CO2 17*   GLUCOSE 381*   BUN 31*   CREATININE 1.63*   CALCIUM 9.1     Recent Labs     06/19/22  2245   ALTSGPT 34   ASTSGOT 56*   ALKPHOSPHAT 91   TBILIRUBIN 0.6   GLUCOSE 381*         Recent Labs     06/19/22  2245   NTPROBNP 51737*         Recent Labs     06/19/22  2245   TROPONINT 389*       Imaging:  DX-CHEST-LIMITED (1 VIEW)   Final Result      1.  Enlarged cardiac silhouette with probable changes of vascular congestion/edema.   2.  Hazy left lower lobe opacity could represent developing pneumonitis.          X-Ray:  I have personally reviewed the images and compared with prior images.    Assessment/Plan:  Justification for Admission Status  I anticipate this patient will require at least two midnights for appropriate medical management, necessitating inpatient admission because Acute congestive heart failure complicated by elevated troponin and substance abuse    * NSTEMI (non-ST elevated myocardial infarction) (HCC)- (present on admission)  Assessment & Plan  Likely  demand mismatch secondary to crack cocaine.  Beta-blocker held.    Aspirin, heparin, atorvastatin, nitrates   follow-up serial troponin, EKG and telemetry monitoring    Leukocytosis  Assessment & Plan  Without fever, likely stress response of crack cocaine.  Follow-up CBC    Acute renal failure (ARF) (HCC)  Assessment & Plan  Likely secondary to crack cocaine.  Avoid nephrotoxic meds and doses,   follow-up urinalysis and repeat BMP    Hyperkalemia  Assessment & Plan  Without peaked T waves, hyperkalemia order set deployed.  Follow-up EKG    Type 2 diabetes mellitus (HCC)- (present on admission)  Assessment & Plan  Regular insulin sliding scale before every meal as needed, follow-up A1c    Mixed ischemic and toxic cardiomyopathy- (present on admission)  Assessment & Plan  Complicated by persistent substance abuse.  Optimize rate and pressure, follow-up echocardiogram    Alcohol dependency (HCC)- (present on admission)  Assessment & Plan  [facility] protocol      VTE prophylaxis: SCDs/TEDs

## 2022-06-20 NOTE — ED NOTES
In to check on patient. Patient sitting up in bed. Alert and oriented. Patient's urinal emptied. 500 ml of urine noted. Patient provided with ice chips. Patient denies any further needs at this time.

## 2022-06-20 NOTE — PROGRESS NOTES
Patient up to unit from ED. Patient is A&Ox4, no complaints of pain at this time, VSS, no signs of distress. Tele monitor placed and verified by monitor room, all questions and concerns answered, call light in reach, will continue to monitor.

## 2022-06-20 NOTE — PROGRESS NOTES
4 Eyes Skin Assessment Completed by KEY Dubon and KEY Coates.    Head WDL  Ears WDL  Nose WDL  Mouth WDL  Neck WDL  Breast/Chest WDL  Shoulder Blades WDL  Spine WDL  (R) Arm/Elbow/Hand Abrasion  (L) Arm/Elbow/Hand Abrasion  Abdomen WDL  Groin WDL  Scrotum/Coccyx/Buttocks WDL  (R) Leg Abrasion  (L) Leg Abrasion  (R) Heel/Foot/Toe WDL  (L) Heel/Foot/Toe WDL    -Skin is intact, no areas of concern. Abrasion to right and left hand and right and left lower extremities.      Devices In Places Tele Box, Blood Pressure Cuff and Pulse Ox      Interventions In Place Pressure Redistribution Mattress    Possible Skin Injury No    Pictures Uploaded Into Epic N/A  Wound Consult Placed N/A  RN Wound Prevention Protocol Ordered No

## 2022-06-20 NOTE — ED NOTES
Patient's significant other Cornelia called. Asked patient if it was okay to speak to her. Patient provided verbal consent. Provided update to Cornelia.

## 2022-06-20 NOTE — ED NOTES
Patient up to bedside to use urinal. 750 ml of urine voided. Patient assisted back into bed. Denies any further needs.

## 2022-06-20 NOTE — PROGRESS NOTES
Hospital Medicine Daily Progress Note    Date of Service  6/20/2022    Chief Complaint  Tucker Frederick is a 66 y.o. male admitted 6/19/2022 with shortness of breath    Hospital Course  66-year-old male with a past medical history of CAD with  of RCA and OM 2, CHF with systolic dysfunction and EF 30%, diabetes mellitus type 2, hypertension, hyperlipidemia, methamphetamine abuse who presented with shortness of breath.  Patient's stated he recently started smoking crack cocaine again.  He was evaluated by his cardiologist Dr. Vinh Arevalo in clinic on 6/1/2022 and is being considered for PCI of his  and ICD placement.  He was previously seen by CT surgery and was considered not a candidate for CABG.    Interval Problem Update  Patient continues to report dyspnea. He is currently on 4L of O2 via NC. He denies any active CP. His CXR shows some vascular congestion and B[place] at [address]. His trop has been high at 407>406. I have started him on IV lasix 40 mg BID. I have consulted cardiology.     I have discussed this patient's plan of care and discharge plan at IDT rounds today with Case Management, Nursing, Nursing leadership, and other members of the IDT team.    Consultants/Specialty  cardiology    Code Status  Full Code    Disposition  Patient is not medically cleared for discharge.   Anticipate discharge to to home with close outpatient follow-up.  I have placed the appropriate orders for post-discharge needs.    Review of Systems  Review of Systems   Respiratory: Positive for shortness of breath.    Cardiovascular: Positive for orthopnea and leg swelling. Negative for chest pain.        Physical Exam  Temp:  [36.6 °C (97.8 °F)] 36.6 °C (97.8 °F)  Pulse:  [103-119] 104  Resp:  [17-64] 20  BP: (102-137)/(58-88) 125/73  SpO2:  [92 %-98 %] 96 %    Physical Exam  Vitals and nursing note reviewed.   Constitutional:       General: He is not in acute distress.     Appearance: Normal appearance.   HENT:       Head: Normocephalic and atraumatic.      Nose: Nose normal.      Mouth/Throat:      Mouth: Mucous membranes are moist.   Eyes:      Extraocular Movements: Extraocular movements intact.      Conjunctiva/sclera: Conjunctivae normal.      Pupils: Pupils are equal, round, and reactive to light.   Cardiovascular:      Rate and Rhythm: Normal rate and regular rhythm.      Pulses: Normal pulses.      Heart sounds: Normal heart sounds.   Pulmonary:      Effort: No respiratory distress.      Breath sounds: Rales (bibasilar) present. No wheezing or rhonchi.   Abdominal:      General: Bowel sounds are normal. There is no distension.      Palpations: Abdomen is soft.      Tenderness: There is no abdominal tenderness.   Musculoskeletal:         General: No swelling or tenderness. Normal range of motion.      Cervical back: Normal range of motion and neck supple.   Lymphadenopathy:      Cervical: No cervical adenopathy.   Skin:     General: Skin is warm.      Coloration: Skin is not jaundiced.      Findings: No rash.   Neurological:      General: No focal deficit present.      Mental Status: He is alert and oriented to person, place, and time.      Cranial Nerves: No cranial nerve deficit.      Motor: No weakness.   Psychiatric:         Mood and Affect: Mood normal.         Behavior: Behavior normal.         Fluids    Intake/Output Summary (Last 24 hours) at 6/20/2022 0913  Last data filed at 6/20/2022 0739  Gross per 24 hour   Intake --   Output 1050 ml   Net -1050 ml       Laboratory  Recent Labs     06/19/22 2245   WBC 19.2*   RBC 4.71   HEMOGLOBIN 14.8   HEMATOCRIT 44.1   MCV 93.6   MCH 31.4   MCHC 33.6*   RDW 44.0   PLATELETCT 531*   MPV 9.8     Recent Labs     06/19/22  2245 06/20/22  0102   SODIUM 136  --    POTASSIUM 6.2* 5.7*   CHLORIDE 97  --    CO2 17*  --    GLUCOSE 381*  --    BUN 31*  --    CREATININE 1.63*  --    CALCIUM 9.1  --      Recent Labs     06/20/22  0209   APTT 24.5*   INR 1.06                Imaging  DX-CHEST-LIMITED (1 VIEW)   Final Result      1.  Enlarged cardiac silhouette with probable changes of vascular congestion/edema.   2.  Hazy left lower lobe opacity could represent developing pneumonitis.           Assessment/Plan  * NSTEMI (non-ST elevated myocardial infarction) ([facility])- (present on admission)  Assessment & Plan  Type II NSTEMI secondary to crack cocaine abuse.  Beta-blocker held.    Aspirin, atorvastatin, nitrates   follow-up serial troponin, EKG and telemetry monitoring  Cardiology consulted, did not recommend intervention due to ongoing methamphetamine abuse    Leukocytosis  Assessment & Plan  Without fever, likely stress response of crack cocaine.  Follow-up CBC    Acute renal failure (ARF) ([facility])- (present on admission)  Assessment & Plan  Likely secondary to crack cocaine versus cardiorenal.  Avoid nephrotoxic meds and doses,   follow-up urinalysis and repeat BMP  IV Lasix 20 mg twice daily  Monitor BMP    Hyperkalemia  Assessment & Plan  Without peaked T waves, hyperkalemia order set deployed.  Follow-up EKG  Repeat potassium shows improvement, continue to monitor  Avoid K supplements  Hold ARB and Aldactone at this time    Type 2 diabetes mellitus (HCC)- (present on admission)  Assessment & Plan  Regular insulin sliding scale before every meal as needed, follow-up A1c    Mixed ischemic and toxic cardiomyopathy- (present on admission)  Assessment & Plan  Complicated by persistent substance abuse.  Optimize rate and pressure, follow-up echocardiogram    Alcohol dependency (HCC)- (present on admission)  Assessment & Plan  [facility] protocol       VTE prophylaxis: heparin ppx    I have performed a physical exam and reviewed and updated ROS and Plan today (6/20/2022). In review of yesterday's note (6/19/2022), there are no changes except as documented above.

## 2022-06-20 NOTE — ED NOTES
Tech from Lab called with critical result of Trop 389 at 2325. Critical lab result read back to Tech.   Dr. Candelaria notified of critical lab result at 2325.  Critical lab result read back by Dr. Candelaria.

## 2022-06-20 NOTE — ED PROVIDER NOTES
ED Provider Note    Chief Complaint:   Shortness of breath    HPI:  Tucker Frederick is a very pleasant 67 yo M with PMH of HFrEF, DM, HTN, CAD who presents with acute onset shortness of breath starting at 4 PM this afternoon.  Patient has reportedly had worsening shortness of breath.  Patient denies chest pain.  Patient endorses lightheadedness, nausea, sweats.  Patient denies lower extremity swelling or tenderness to palpation.  Patient denies fevers or chills  No sore throat.    Review of Systems:  Review of Systems   Constitutional: Positive for diaphoresis. Negative for chills and fever.   HENT: Negative for congestion and sore throat.    Eyes: Negative for blurred vision and double vision.   Respiratory: Positive for shortness of breath. Negative for cough.    Cardiovascular: Negative for chest pain and leg swelling.   Gastrointestinal: Positive for nausea. Negative for abdominal pain and vomiting.   Genitourinary: Negative for dysuria and hematuria.   Musculoskeletal: Negative for falls and neck pain.   Skin: Negative for itching and rash.   Neurological: Positive for dizziness. Negative for loss of consciousness and headaches.       Family History:  Family History   Problem Relation Age of Onset   • Heart Disease Father          of MI at 55   • Heart Attack Father    • Lung Cancer Father    • Diabetes Mother    • Cancer Mother         unknown source, metastasize   • Multiple Sclerosis Sister        Past Medical History:   has a past medical history of CHF (congestive heart failure) (HCC), Diabetes (HCC), Hyperlipidemia, Hypertension, and Pulmonary edema.    Social History:  Social History     Tobacco Use   • Smoking status: Former Smoker     Packs/day: 1.00     Years: 30.00     Pack years: 30.00     Types: Cigarettes     Quit date:      Years since quittin.4   • Smokeless tobacco: Never Used   Vaping Use   • Vaping Use: Never used   Substance and Sexual Activity   • Alcohol use: Yes      "Comment: 2-5 beers daily   • Drug use: Not Currently     Types: Cocaine, Methamphetamines     Comment: Quit Cocaine 2010, last Meth use 3/28/2022   • Sexual activity: Not on file       Surgical History:  patient denies any surgical history    Allergies:  No Known Allergies    Physical Exam:  Vital Signs: /88   Pulse (!) 109   Temp 36.6 °C (97.8 °F) (Temporal)   Resp (!) 22   Ht 1.676 m (5' 6\")   Wt 77.6 kg (171 lb)   SpO2 93%   BMI 27.60 kg/m²   Physical Exam  Vitals and nursing note reviewed.   Constitutional:       Comments: Patient is lying in bed supine, increased work of breathing, in acute respiratory distress, noninvasive positive pressure ventilation in place    HENT:      Head: Normocephalic and atraumatic.   Eyes:      Extraocular Movements: Extraocular movements intact.      Conjunctiva/sclera: Conjunctivae normal.      Pupils: Pupils are equal, round, and reactive to light.   Cardiovascular:      Pulses: Normal pulses.      Comments:   Pulmonary:      Effort: Pulmonary effort is normal. No respiratory distress.      Breath sounds: Examination of the right-lower field reveals rales. Examination of the left-lower field reveals rales. Rales present.   Abdominal:      Palpations: Abdomen is soft.      Tenderness: There is no abdominal tenderness.      Comments: + distention   Musculoskeletal:         General: No swelling. Normal range of motion.      Cervical back: Normal range of motion. No rigidity.      Right lower leg: No edema.      Left lower leg: No edema.   Skin:     General: Skin is warm and dry.      Capillary Refill: Capillary refill takes less than 2 seconds.   Neurological:      Mental Status: He is alert.         Medical records reviewed for continuity of care.     Results for orders placed or performed during the hospital encounter of 06/19/22   CBC WITH DIFFERENTIAL   Result Value Ref Range    WBC 19.2 (H) 4.8 - 10.8 K/uL    RBC 4.71 4.70 - 6.10 M/uL    Hemoglobin 14.8 14.0 " - 18.0 g/dL    Hematocrit 44.1 42.0 - 52.0 %    MCV 93.6 81.4 - 97.8 fL    MCH 31.4 27.0 - 33.0 pg    MCHC 33.6 (L) 33.7 - 35.3 g/dL    RDW 44.0 35.9 - 50.0 fL    Platelet Count 531 (H) 164 - 446 K/uL    MPV 9.8 9.0 - 12.9 fL    Neutrophils-Polys 90.40 (H) 44.00 - 72.00 %    Lymphocytes 4.40 (L) 22.00 - 41.00 %    Monocytes 4.30 0.00 - 13.40 %    Eosinophils 0.00 0.00 - 6.90 %    Basophils 0.00 0.00 - 1.80 %    Nucleated RBC 0.00 /100 WBC    Neutrophils (Absolute) 17.36 (H) 1.82 - 7.42 K/uL    Lymphs (Absolute) 0.84 (L) 1.00 - 4.80 K/uL    Monos (Absolute) 0.83 0.00 - 0.85 K/uL    Eos (Absolute) 0.00 0.00 - 0.51 K/uL    Baso (Absolute) 0.00 0.00 - 0.12 K/uL    NRBC (Absolute) 0.00 K/uL   Comp Metabolic Panel   Result Value Ref Range    Sodium 136 135 - 145 mmol/L    Potassium 6.2 (H) 3.6 - 5.5 mmol/L    Chloride 97 96 - 112 mmol/L    Co2 17 (L) 20 - 33 mmol/L    Anion Gap 22.0 (H) 7.0 - 16.0    Glucose 381 (H) 65 - 99 mg/dL    Bun 31 (H) 8 - 22 mg/dL    Creatinine 1.63 (H) 0.50 - 1.40 mg/dL    Calcium 9.1 8.5 - 10.5 mg/dL    AST(SGOT) 56 (H) 12 - 45 U/L    ALT(SGPT) 34 2 - 50 U/L    Alkaline Phosphatase 91 30 - 99 U/L    Total Bilirubin 0.6 0.1 - 1.5 mg/dL    Albumin 4.6 3.2 - 4.9 g/dL    Total Protein 7.7 6.0 - 8.2 g/dL    Globulin 3.1 1.9 - 3.5 g/dL    A-G Ratio 1.5 g/dL   proBrain Natriuretic Peptide, NT   Result Value Ref Range    NT-proBNP 69195 (H) 0 - 125 pg/mL   TROPONIN   Result Value Ref Range    Troponin T 389 (H) 6 - 19 ng/L   DIFFERENTIAL MANUAL   Result Value Ref Range    Myelocytes 0.90 %    Manual Diff Status PERFORMED    PERIPHERAL SMEAR REVIEW   Result Value Ref Range    Peripheral Smear Review see below    PLATELET ESTIMATE   Result Value Ref Range    Plt Estimation Increased    MORPHOLOGY   Result Value Ref Range    RBC Morphology Normal    ESTIMATED GFR   Result Value Ref Range    GFR (CKD-EPI) 46 (A) >60 mL/min/1.73 m 2   EKG   Result Value Ref Range    Report       [facility]  [place] [facility]    Test Date:  2022  Pt Name:    RAUL RODRIGUEZ                 Department: ER  MRN:        [id number]                      Room:       RD 06  Gender:     Male                         Technician: [id number]  :        1955                   Requested By:ER TRIAGE PROTOCOL  Order #:    [id number]                    Reading MD: Robby Candelaria MD    Measurements  Intervals                                Axis  Rate:       116                          P:          62  ND:         152                          QRS:        37  QRSD:       108                          T:          45  QT:         340  QTc:        473    Interpretive Statements  SINUS TACHYCARDIA  J-point elevation in lead V3 but improved from previous EKG  INCOMPLETE LEFT BUNDLE BRANCH BLOCK  No RUT, TWI, STD  Electronically Signed On 2022 22:54:24 PDT by Robby Candelaria MD     POCT glucose device results   Result Value Ref Range    POC Glucose, Blood 328 (H) 65 - 99 mg/dL   POCT glucose device results   Result Value Ref Range    POC Glucose, Blood 446 (HH) 65 - 99 mg/dL       Radiology:  DX-CHEST-LIMITED (1 VIEW)   Final Result      1.  Enlarged cardiac silhouette with probable changes of vascular congestion/edema.   2.  Hazy left lower lobe opacity could represent developing pneumonitis.           MDM:  Patient is in acute respiratory distress, I do support continuation of NIPPV, Nitropaste.  I do also support adding 81 mg to the patient's 3 baby aspirin's he already took this evening.  Patient has received 40 mg IV Lasix and 40 mg oral Lasix, patient is diuretic naïve so I do believe this is an appropriate dose for the moment.  CBC to evaluate for acute anemia and leukocytosis.  CMP to evaluate for acute electrolyte abnormality, acute kidney injury, acute liver failure or dysfunction.  Troponin to evaluate for ACS, EKG demonstrates no acute ischemic changes.  Patient is likely in heart failure exacerbation at this time.   Chest x-ray to evaluate for acute cardiopulmonary process such as pneumonia, pulmonary edema, pneumothorax.  Disposition pending work-up.    Electronically signed by: Robby Candelaria M.D., 6/19/2022, 10:49 PM    Troponin is below the patient's baseline, EKG less concerning compared to previous for ACS.  Patient does appear to be in acute heart failure exacerbation given elevated BNP, pulmonary edema, respiratory distress, hypoxic respiratory failure.  Patient has done well with noninvasive positive pressure ventilation and has been weaned off and is now on nasal cannula.  Dr. Dale of [facility] has graciously accepted the patient to his service for admission.    This dictation has been created using voice recognition software. I am continuously working with the software to minimize the number of voice recognition errors and I have made every attempt to manually correct the errors within my dictation. However errors  related to this voice recognition software may still exist and should be interpreted within the appropriate context.     Electronically signed by: Robby Candelaria M.D., 6/20/2022 1:19 AM      Critical Care    I spent 45 minutes of critical care time with this patient. This does not include time spent on separately reported billable procedures.   This includes pulse ox interpretation, medical record review when available, interpretation of labs and imaging, specialist consultation, and hemodynamic monitoring.      Disposition:  Hospital medicine floor    Final Impression:  1. Acute on chronic congestive heart failure, unspecified heart failure type (HCC)    2. Acute respiratory failure with hypoxia (HCC)    3. Acute pulmonary edema (HCC)    4. Methamphetamine use (HCC)    5. Acute hyperkalemia

## 2022-06-21 ENCOUNTER — PATIENT OUTREACH (OUTPATIENT)
Dept: SCHEDULING | Facility: IMAGING CENTER | Age: 67
End: 2022-06-21
Payer: COMMERCIAL

## 2022-06-21 ENCOUNTER — APPOINTMENT (OUTPATIENT)
Dept: CARDIOLOGY | Facility: MEDICAL CENTER | Age: 67
DRG: 896 | End: 2022-06-21
Attending: HOSPITALIST
Payer: COMMERCIAL

## 2022-06-21 LAB
ANION GAP SERPL CALC-SCNC: 15 MMOL/L (ref 7–16)
BASOPHILS # BLD AUTO: 0.4 % (ref 0–1.8)
BASOPHILS # BLD: 0.05 K/UL (ref 0–0.12)
BUN SERPL-MCNC: 34 MG/DL (ref 8–22)
CALCIUM SERPL-MCNC: 9.3 MG/DL (ref 8.5–10.5)
CHLORIDE SERPL-SCNC: 97 MMOL/L (ref 96–112)
CHOLEST SERPL-MCNC: 167 MG/DL (ref 100–199)
CO2 SERPL-SCNC: 27 MMOL/L (ref 20–33)
CREAT SERPL-MCNC: 1.45 MG/DL (ref 0.5–1.4)
EOSINOPHIL # BLD AUTO: 0.07 K/UL (ref 0–0.51)
EOSINOPHIL NFR BLD: 0.6 % (ref 0–6.9)
ERYTHROCYTE [DISTWIDTH] IN BLOOD BY AUTOMATED COUNT: 44.1 FL (ref 35.9–50)
GFR SERPLBLD CREATININE-BSD FMLA CKD-EPI: 53 ML/MIN/1.73 M 2
GLUCOSE BLD STRIP.AUTO-MCNC: 187 MG/DL (ref 65–99)
GLUCOSE BLD STRIP.AUTO-MCNC: 216 MG/DL (ref 65–99)
GLUCOSE BLD STRIP.AUTO-MCNC: 239 MG/DL (ref 65–99)
GLUCOSE SERPL-MCNC: 157 MG/DL (ref 65–99)
HCT VFR BLD AUTO: 40 % (ref 42–52)
HDLC SERPL-MCNC: 46 MG/DL
HGB BLD-MCNC: 13.4 G/DL (ref 14–18)
IMM GRANULOCYTES # BLD AUTO: 0.06 K/UL (ref 0–0.11)
IMM GRANULOCYTES NFR BLD AUTO: 0.5 % (ref 0–0.9)
LDLC SERPL CALC-MCNC: 80 MG/DL
LV EJECT FRACT  99904: 35
LV EJECT FRACT  99904: 45
LV EJECT FRACT MOD 2C 99903: 42.28
LV EJECT FRACT MOD 2C 99903: 49.95
LV EJECT FRACT MOD 4C 99902: 36.15
LV EJECT FRACT MOD 4C 99902: 53.14
LV EJECT FRACT MOD BP 99901: 36.78
LV EJECT FRACT MOD BP 99901: 49.45
LYMPHOCYTES # BLD AUTO: 1.44 K/UL (ref 1–4.8)
LYMPHOCYTES NFR BLD: 11.3 % (ref 22–41)
MAGNESIUM SERPL-MCNC: 2.2 MG/DL (ref 1.5–2.5)
MCH RBC QN AUTO: 30.9 PG (ref 27–33)
MCHC RBC AUTO-ENTMCNC: 33.5 G/DL (ref 33.7–35.3)
MCV RBC AUTO: 92.2 FL (ref 81.4–97.8)
MONOCYTES # BLD AUTO: 1.16 K/UL (ref 0–0.85)
MONOCYTES NFR BLD AUTO: 9.1 % (ref 0–13.4)
NEUTROPHILS # BLD AUTO: 9.91 K/UL (ref 1.82–7.42)
NEUTROPHILS NFR BLD: 78.1 % (ref 44–72)
NRBC # BLD AUTO: 0 K/UL
NRBC BLD-RTO: 0 /100 WBC
NT-PROBNP SERPL IA-MCNC: 8049 PG/ML (ref 0–125)
PLATELET # BLD AUTO: 385 K/UL (ref 164–446)
PMV BLD AUTO: 10 FL (ref 9–12.9)
POTASSIUM SERPL-SCNC: 4.1 MMOL/L (ref 3.6–5.5)
RBC # BLD AUTO: 4.34 M/UL (ref 4.7–6.1)
SODIUM SERPL-SCNC: 139 MMOL/L (ref 135–145)
TRIGL SERPL-MCNC: 205 MG/DL (ref 0–149)
WBC # BLD AUTO: 12.7 K/UL (ref 4.8–10.8)

## 2022-06-21 PROCEDURE — A9270 NON-COVERED ITEM OR SERVICE: HCPCS | Performed by: INTERNAL MEDICINE

## 2022-06-21 PROCEDURE — 700111 HCHG RX REV CODE 636 W/ 250 OVERRIDE (IP): Performed by: INTERNAL MEDICINE

## 2022-06-21 PROCEDURE — 700102 HCHG RX REV CODE 250 W/ 637 OVERRIDE(OP): Performed by: HOSPITALIST

## 2022-06-21 PROCEDURE — A9270 NON-COVERED ITEM OR SERVICE: HCPCS | Performed by: HOSPITALIST

## 2022-06-21 PROCEDURE — 80061 LIPID PANEL: CPT

## 2022-06-21 PROCEDURE — 93306 TTE W/DOPPLER COMPLETE: CPT

## 2022-06-21 PROCEDURE — 83880 ASSAY OF NATRIURETIC PEPTIDE: CPT

## 2022-06-21 PROCEDURE — 700102 HCHG RX REV CODE 250 W/ 637 OVERRIDE(OP): Performed by: INTERNAL MEDICINE

## 2022-06-21 PROCEDURE — 99233 SBSQ HOSP IP/OBS HIGH 50: CPT | Performed by: HOSPITALIST

## 2022-06-21 PROCEDURE — 85025 COMPLETE CBC W/AUTO DIFF WBC: CPT

## 2022-06-21 PROCEDURE — 700111 HCHG RX REV CODE 636 W/ 250 OVERRIDE (IP): Performed by: HOSPITALIST

## 2022-06-21 PROCEDURE — 80048 BASIC METABOLIC PNL TOTAL CA: CPT

## 2022-06-21 PROCEDURE — 82962 GLUCOSE BLOOD TEST: CPT | Mod: 91

## 2022-06-21 PROCEDURE — 770020 HCHG ROOM/CARE - TELE (206)

## 2022-06-21 PROCEDURE — 83735 ASSAY OF MAGNESIUM: CPT

## 2022-06-21 PROCEDURE — 36415 COLL VENOUS BLD VENIPUNCTURE: CPT

## 2022-06-21 PROCEDURE — 93306 TTE W/DOPPLER COMPLETE: CPT | Mod: 26 | Performed by: INTERNAL MEDICINE

## 2022-06-21 RX ORDER — DAPAGLIFLOZIN 10 MG/1
10 TABLET, FILM COATED ORAL DAILY
Status: DISCONTINUED | OUTPATIENT
Start: 2022-06-21 | End: 2022-06-23 | Stop reason: HOSPADM

## 2022-06-21 RX ORDER — CARVEDILOL 3.12 MG/1
3.12 TABLET ORAL 2 TIMES DAILY WITH MEALS
Status: DISCONTINUED | OUTPATIENT
Start: 2022-06-21 | End: 2022-06-22

## 2022-06-21 RX ORDER — ENOXAPARIN SODIUM 100 MG/ML
40 INJECTION SUBCUTANEOUS DAILY
Status: DISCONTINUED | OUTPATIENT
Start: 2022-06-21 | End: 2022-06-23 | Stop reason: HOSPADM

## 2022-06-21 RX ORDER — SPIRONOLACTONE 25 MG/1
25 TABLET ORAL DAILY
Status: DISCONTINUED | OUTPATIENT
Start: 2022-06-21 | End: 2022-06-23 | Stop reason: HOSPADM

## 2022-06-21 RX ADMIN — INSULIN HUMAN 3 UNITS: 100 INJECTION, SOLUTION PARENTERAL at 11:29

## 2022-06-21 RX ADMIN — CLOPIDOGREL BISULFATE 75 MG: 75 TABLET ORAL at 05:43

## 2022-06-21 RX ADMIN — INSULIN HUMAN 2 UNITS: 100 INJECTION, SOLUTION PARENTERAL at 07:50

## 2022-06-21 RX ADMIN — ENOXAPARIN SODIUM 40 MG: 40 INJECTION SUBCUTANEOUS at 18:22

## 2022-06-21 RX ADMIN — SACUBITRIL AND VALSARTAN 1 TABLET: 97; 103 TABLET, FILM COATED ORAL at 18:22

## 2022-06-21 RX ADMIN — SPIRONOLACTONE 25 MG: 25 TABLET, FILM COATED ORAL at 17:01

## 2022-06-21 RX ADMIN — FUROSEMIDE 40 MG: 10 INJECTION, SOLUTION INTRAMUSCULAR; INTRAVENOUS at 05:43

## 2022-06-21 RX ADMIN — INSULIN HUMAN 3 UNITS: 100 INJECTION, SOLUTION PARENTERAL at 21:55

## 2022-06-21 RX ADMIN — THERA TABS 1 TABLET: TAB at 05:43

## 2022-06-21 RX ADMIN — FUROSEMIDE 40 MG: 10 INJECTION, SOLUTION INTRAMUSCULAR; INTRAVENOUS at 17:00

## 2022-06-21 RX ADMIN — FOLIC ACID 1 MG: 1 TABLET ORAL at 05:43

## 2022-06-21 RX ADMIN — CARVEDILOL 3.12 MG: 3.12 TABLET, FILM COATED ORAL at 17:10

## 2022-06-21 RX ADMIN — ASPIRIN 81 MG: 81 TABLET, COATED ORAL at 05:43

## 2022-06-21 RX ADMIN — DAPAGLIFLOZIN 10 MG: 10 TABLET, FILM COATED ORAL at 17:01

## 2022-06-21 RX ADMIN — Medication 100 MG: at 05:43

## 2022-06-21 RX ADMIN — INSULIN HUMAN 3 UNITS: 100 INJECTION, SOLUTION PARENTERAL at 17:06

## 2022-06-21 RX ADMIN — ATORVASTATIN CALCIUM 80 MG: 80 TABLET, FILM COATED ORAL at 17:00

## 2022-06-21 ASSESSMENT — LIFESTYLE VARIABLES
AUDITORY DISTURBANCES: NOT PRESENT
TOTAL SCORE: 2
VISUAL DISTURBANCES: NOT PRESENT
HEADACHE, FULLNESS IN HEAD: NOT PRESENT
ORIENTATION AND CLOUDING OF SENSORIUM: ORIENTED AND CAN DO SERIAL ADDITIONS
VISUAL DISTURBANCES: NOT PRESENT
NAUSEA AND VOMITING: NO NAUSEA AND NO VOMITING
ORIENTATION AND CLOUDING OF SENSORIUM: ORIENTED AND CAN DO SERIAL ADDITIONS
AGITATION: NORMAL ACTIVITY
NAUSEA AND VOMITING: NO NAUSEA AND NO VOMITING
VISUAL DISTURBANCES: NOT PRESENT
ANXIETY: NO ANXIETY (AT EASE)
PAROXYSMAL SWEATS: NO SWEAT VISIBLE
HEADACHE, FULLNESS IN HEAD: NOT PRESENT
HEADACHE, FULLNESS IN HEAD: NOT PRESENT
ORIENTATION AND CLOUDING OF SENSORIUM: ORIENTED AND CAN DO SERIAL ADDITIONS
ANXIETY: NO ANXIETY (AT EASE)
AUDITORY DISTURBANCES: NOT PRESENT
VISUAL DISTURBANCES: NOT PRESENT
TOTAL SCORE: 1
AUDITORY DISTURBANCES: NOT PRESENT
NAUSEA AND VOMITING: NO NAUSEA AND NO VOMITING
PAROXYSMAL SWEATS: NO SWEAT VISIBLE
TOTAL SCORE: 1
ORIENTATION AND CLOUDING OF SENSORIUM: ORIENTED AND CAN DO SERIAL ADDITIONS
ORIENTATION AND CLOUDING OF SENSORIUM: ORIENTED AND CAN DO SERIAL ADDITIONS
AGITATION: NORMAL ACTIVITY
AGITATION: *
TREMOR: NO TREMOR
AGITATION: NORMAL ACTIVITY
AGITATION: NORMAL ACTIVITY
ANXIETY: NO ANXIETY (AT EASE)
NAUSEA AND VOMITING: NO NAUSEA AND NO VOMITING
NAUSEA AND VOMITING: NO NAUSEA AND NO VOMITING
TREMOR: TREMOR NOT VISIBLE BUT CAN BE FELT, FINGERTIP TO FINGERTIP
AUDITORY DISTURBANCES: NOT PRESENT
TREMOR: TREMOR NOT VISIBLE BUT CAN BE FELT, FINGERTIP TO FINGERTIP
HEADACHE, FULLNESS IN HEAD: NOT PRESENT
TOTAL SCORE: 1
TOTAL SCORE: 1
AUDITORY DISTURBANCES: NOT PRESENT
PAROXYSMAL SWEATS: NO SWEAT VISIBLE
TREMOR: TREMOR NOT VISIBLE BUT CAN BE FELT, FINGERTIP TO FINGERTIP
TREMOR: TREMOR NOT VISIBLE BUT CAN BE FELT, FINGERTIP TO FINGERTIP
ANXIETY: NO ANXIETY (AT EASE)
PAROXYSMAL SWEATS: NO SWEAT VISIBLE
SUBSTANCE_ABUSE: 0
HEADACHE, FULLNESS IN HEAD: NOT PRESENT
ANXIETY: NO ANXIETY (AT EASE)
VISUAL DISTURBANCES: NOT PRESENT
PAROXYSMAL SWEATS: NO SWEAT VISIBLE

## 2022-06-21 ASSESSMENT — ENCOUNTER SYMPTOMS
BLURRED VISION: 0
SORE THROAT: 0
FEVER: 0
DIAPHORESIS: 0
BRUISES/BLEEDS EASILY: 0
GASTROINTESTINAL NEGATIVE: 1
ORTHOPNEA: 1
PSYCHIATRIC NEGATIVE: 1
NEUROLOGICAL NEGATIVE: 1
VOMITING: 0
NAUSEA: 0
SHORTNESS OF BREATH: 1
COUGH: 0
HEADACHES: 0
ABDOMINAL PAIN: 0
DIZZINESS: 0
FOCAL WEAKNESS: 0
PALPITATIONS: 0
MYALGIAS: 0
WEAKNESS: 0
WEIGHT LOSS: 0
DEPRESSION: 0
EYES NEGATIVE: 1
CONSTITUTIONAL NEGATIVE: 1
MUSCULOSKELETAL NEGATIVE: 1
CHILLS: 0

## 2022-06-21 ASSESSMENT — FIBROSIS 4 INDEX: FIB4 SCORE: 1.62

## 2022-06-21 ASSESSMENT — PAIN DESCRIPTION - PAIN TYPE
TYPE: ACUTE PAIN
TYPE: ACUTE PAIN

## 2022-06-21 NOTE — CARE PLAN
The patient is Watcher - Medium risk of patient condition declining or worsening    Shift Goals  Clinical Goals: VS stable, labs stable, CIWA  Patient Goals: rest  Family Goals: meche    Progress made toward(s) clinical / shift goals:    Problem: Risk for Aspiration  Goal: Patient's risk for aspiration will be absent or decrease  Outcome: Progressing     Problem: Psychosocial  Goal: Patient's level of anxiety will decrease  Outcome: Progressing     Problem: Knowledge Deficit - Standard  Goal: Patient and family/care givers will demonstrate understanding of plan of care, disease process/condition, diagnostic tests and medications  Outcome: Progressing     Problem: Fall Risk  Goal: Patient will remain free from falls  Outcome: Progressing       Patient is not progressing towards the following goals:

## 2022-06-21 NOTE — DISCHARGE PLANNING
Case Management Discharge Planning    Admission Date: 6/19/2022  GMLOS: 3.8  ALOS: 1    6-Clicks ADL Score: 24  6-Clicks Mobility Score: 24      Anticipated Discharge Dispo: Discharge Disposition: Discharged to home/self care (01)    DME Needed: TBD - oxygen needs at discharge    Action(s) Taken: OTHER-chart review     Escalations Completed: None    Medically Clear: No    Next Steps: f/u with pt and medical team to discuss dc needs and barriers.    Barriers to Discharge: Medical clearance

## 2022-06-21 NOTE — CARE PLAN
The patient is Stable - Low risk of patient condition declining or worsening    Shift Goals  Clinical Goals: Pt will be hemodynamically stable  Patient Goals: Rest  Family Goals: THOM    Progress made toward(s) clinical / shift goals:    Problem: Seizure Precautions  Goal: Implementation of seizure precautions  Outcome: Progressing     Problem: Psychosocial  Goal: Patient's level of anxiety will decrease  Outcome: Progressing     Problem: Risk for Aspiration  Goal: Patient's risk for aspiration will be absent or decrease  Outcome: Progressing     Problem: Fall Risk  Goal: Patient will remain free from falls  Outcome: Progressing     Problem: Pain - Standard  Goal: Alleviation of pain or a reduction in pain to the patient’s comfort goal  Outcome: Progressing       Patient is not progressing towards the following goals:

## 2022-06-21 NOTE — PROGRESS NOTES
Report received from Serafin Garcia. Assumed pt care. Pt up to bathroom. Pt A&O x 4. Fall precautions in place, call light and belongings within reach, bed in lowest position. No signs of distress.

## 2022-06-21 NOTE — PROGRESS NOTES
Hospital Medicine Daily Progress Note    Date of Service  6/21/2022    Chief Complaint  Tucker Frederick is a 66 y.o. male admitted 6/19/2022 with shortness of breath    Hospital Course  Patient is a 66-year-old male with a past medical history of CAD with  of RCA and OM 2, CHF with systolic dysfunction and EF 30%, diabetes mellitus type 2, hypertension, hyperlipidemia, methamphetamine abuse who presented with shortness of breath.  Patient's stated he recently started smoking crack cocaine again.  He was evaluated by his cardiologist Dr. Vinh Arevalo in clinic on 6/1/2022 and is being considered for PCI of his  and ICD placement.  He was previously seen by CT surgery and was considered not a candidate for CABG.    Interval Problem Update  He is axox3, he states his sob is improving but some ongoing brooks. Oxygen requirements are now down to 3L. Cardiology was asked to evaluate patient but did not feel a consult was indicated due to patient's recent drug use. We discussed his drug and etoh abuse - he states he is highly motivated to quit - states he was sober for over 7 years and is going to rejoin AA. [facility]. He denies chest pain. ROS otherwise negative. Will resume BB as tolerated, resume aldactone. He declined dvt prophylaxis - preferring to ambulate tid which he is doing. ROS otherwise negative    I have discussed this patient's plan of care and discharge plan at IDT rounds today with Case Management, Nursing, Nursing leadership, and other members of the IDT team.    Consultants/Specialty  cardiology    Code Status  Full Code    Disposition  Patient is not medically cleared for discharge.   Anticipate discharge to to home with close outpatient follow-up.  I have placed the appropriate orders for post-discharge needs.    Review of Systems  Review of Systems   Constitutional: Negative.  Negative for chills, diaphoresis, fever, malaise/fatigue  and weight loss.   HENT: Negative.  Negative for sore throat.    Eyes: Negative.  Negative for blurred vision.   Respiratory: Positive for shortness of breath. Negative for cough.    Cardiovascular: Positive for orthopnea and leg swelling (improving). Negative for chest pain and palpitations.   Gastrointestinal: Negative.  Negative for abdominal pain, nausea and vomiting.   Genitourinary: Negative.  Negative for dysuria.   Musculoskeletal: Negative.  Negative for myalgias.   Skin: Negative.  Negative for itching and rash.   Neurological: Negative.  Negative for dizziness, focal weakness, weakness and headaches.   Endo/Heme/Allergies: Negative.  Does not bruise/bleed easily.   Psychiatric/Behavioral: Negative.  Negative for depression, substance abuse and suicidal ideas.   All other systems reviewed and are negative.       Physical Exam  Temp:  [36.1 °C (97 °F)-36.7 °C (98 °F)] 36.7 °C (98 °F)  Pulse:  [] 80  Resp:  [16-20] 16  BP: (100-125)/(61-71) 125/65  SpO2:  [92 %-96 %] 96 %    Physical Exam  Vitals and nursing note reviewed.   Constitutional:       General: He is not in acute distress.     Appearance: Normal appearance.   HENT:      Head: Normocephalic and atraumatic.      Nose: Nose normal.      Mouth/Throat:      Mouth: Mucous membranes are moist.   Eyes:      Extraocular Movements: Extraocular movements intact.      Conjunctiva/sclera: Conjunctivae normal.      Pupils: Pupils are equal, round, and reactive to light.   Cardiovascular:      Rate and Rhythm: Normal rate and regular rhythm.      Pulses: Normal pulses.      Heart sounds: Normal heart sounds.   Pulmonary:      Effort: No respiratory distress.      Breath sounds: Rales (bibasilar) present. No wheezing or rhonchi.   Abdominal:      General: Bowel sounds are normal. There is no distension.      Palpations: Abdomen is soft.      Tenderness: There is no abdominal tenderness.   Musculoskeletal:         General: No swelling or tenderness. Normal  range of motion.      Cervical back: Normal range of motion and neck supple.   Lymphadenopathy:      Cervical: No cervical adenopathy.   Skin:     General: Skin is warm.      Coloration: Skin is not jaundiced.      Findings: No rash.   Neurological:      General: No focal deficit present.      Mental Status: He is alert and oriented to person, place, and time.      Cranial Nerves: No cranial nerve deficit.      Motor: No weakness.   Psychiatric:         Mood and Affect: Mood normal.         Behavior: Behavior normal.         Fluids    Intake/Output Summary (Last 24 hours) at 6/21/2022 1606  Last data filed at 6/20/2022 2300  Gross per 24 hour   Intake 722 ml   Output 926 ml   Net -204 ml       Laboratory  Recent Labs     06/19/22 2245 06/21/22  0149   WBC 19.2* 12.7*   RBC 4.71 4.34*   HEMOGLOBIN 14.8 13.4*   HEMATOCRIT 44.1 40.0*   MCV 93.6 92.2   MCH 31.4 30.9   MCHC 33.6* 33.5*   RDW 44.0 44.1   PLATELETCT 531* 385   MPV 9.8 10.0     Recent Labs     06/19/22 2245 06/20/22  0102 06/21/22  0149   SODIUM 136  --  139   POTASSIUM 6.2* 5.7* 4.1   CHLORIDE 97  --  97   CO2 17*  --  27   GLUCOSE 381*  --  157*   BUN 31*  --  34*   CREATININE 1.63*  --  1.45*   CALCIUM 9.1  --  9.3     Recent Labs     06/20/22  0209   APTT 24.5*   INR 1.06         Recent Labs     06/21/22  0149   TRIGLYCERIDE 205*   HDL 46   LDL 80       Imaging  DX-CHEST-LIMITED (1 VIEW)   Final Result      1.  Enlarged cardiac silhouette with probable changes of vascular congestion/edema.   2.  Hazy left lower lobe opacity could represent developing pneumonitis.      EC-ECHOCARDIOGRAM COMPLETE W/O CONT    (Results Pending)        Assessment/Plan  * NSTEMI (non-ST elevated myocardial infarction) (HCC)- (present on admission)  Assessment & Plan  Type II NSTEMI secondary to crack cocaine abuse.  Beta-blocker held.    Aspirin, atorvastatin, nitrates   follow-up serial troponin, EKG and telemetry monitoring  Cardiology consulted, did not recommend  intervention due to ongoing methamphetamine abuse    Leukocytosis  Assessment & Plan  Likely stress reaction  Resolving  following    Acute renal failure (ARF) (HCC)- (present on admission)  Assessment & Plan  Likely secondary to crack cocaine versus cardiorenal.  Avoid nephrotoxic meds and doses,   follow-up urinalysis and repeat BMP  IV Lasix 20 mg twice daily  Monitor BMP  Improving  Continue to follow     Hyperkalemia  Assessment & Plan  Without peaked T waves, hyperkalemia order set deployed.  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  Optimize rate and pressure  Repeat echo pending    Alcohol dependency (HCC)- (present on admission)  Assessment & Plan  CIWA protocol  No s/o withdraw  Cessation discussed and recommended       VTE prophylaxis: heparin ppx    I have performed a physical exam and reviewed and updated ROS and Plan today (6/21/2022). In review of yesterday's note (6/20/2022), there are no changes except as documented above.

## 2022-06-22 LAB
ANION GAP SERPL CALC-SCNC: 16 MMOL/L (ref 7–16)
BASOPHILS # BLD AUTO: 0.6 % (ref 0–1.8)
BASOPHILS # BLD: 0.05 K/UL (ref 0–0.12)
BUN SERPL-MCNC: 45 MG/DL (ref 8–22)
CALCIUM SERPL-MCNC: 8.8 MG/DL (ref 8.5–10.5)
CHLORIDE SERPL-SCNC: 96 MMOL/L (ref 96–112)
CO2 SERPL-SCNC: 26 MMOL/L (ref 20–33)
CREAT SERPL-MCNC: 1.67 MG/DL (ref 0.5–1.4)
EOSINOPHIL # BLD AUTO: 0.13 K/UL (ref 0–0.51)
EOSINOPHIL NFR BLD: 1.5 % (ref 0–6.9)
ERYTHROCYTE [DISTWIDTH] IN BLOOD BY AUTOMATED COUNT: 43.7 FL (ref 35.9–50)
GFR SERPLBLD CREATININE-BSD FMLA CKD-EPI: 45 ML/MIN/1.73 M 2
GLUCOSE BLD STRIP.AUTO-MCNC: 183 MG/DL (ref 65–99)
GLUCOSE BLD STRIP.AUTO-MCNC: 191 MG/DL (ref 65–99)
GLUCOSE BLD STRIP.AUTO-MCNC: 211 MG/DL (ref 65–99)
GLUCOSE BLD STRIP.AUTO-MCNC: 215 MG/DL (ref 65–99)
GLUCOSE BLD STRIP.AUTO-MCNC: 259 MG/DL (ref 65–99)
GLUCOSE SERPL-MCNC: 161 MG/DL (ref 65–99)
HCT VFR BLD AUTO: 38.1 % (ref 42–52)
HGB BLD-MCNC: 12.6 G/DL (ref 14–18)
IMM GRANULOCYTES # BLD AUTO: 0.02 K/UL (ref 0–0.11)
IMM GRANULOCYTES NFR BLD AUTO: 0.2 % (ref 0–0.9)
LYMPHOCYTES # BLD AUTO: 1.7 K/UL (ref 1–4.8)
LYMPHOCYTES NFR BLD: 19.5 % (ref 22–41)
MAGNESIUM SERPL-MCNC: 2.4 MG/DL (ref 1.5–2.5)
MCH RBC QN AUTO: 30.4 PG (ref 27–33)
MCHC RBC AUTO-ENTMCNC: 33.1 G/DL (ref 33.7–35.3)
MCV RBC AUTO: 92 FL (ref 81.4–97.8)
MONOCYTES # BLD AUTO: 1.22 K/UL (ref 0–0.85)
MONOCYTES NFR BLD AUTO: 14 % (ref 0–13.4)
NEUTROPHILS # BLD AUTO: 5.58 K/UL (ref 1.82–7.42)
NEUTROPHILS NFR BLD: 64.2 % (ref 44–72)
NRBC # BLD AUTO: 0 K/UL
NRBC BLD-RTO: 0 /100 WBC
PLATELET # BLD AUTO: 320 K/UL (ref 164–446)
PMV BLD AUTO: 10.1 FL (ref 9–12.9)
POTASSIUM SERPL-SCNC: 3.7 MMOL/L (ref 3.6–5.5)
RBC # BLD AUTO: 4.14 M/UL (ref 4.7–6.1)
SODIUM SERPL-SCNC: 138 MMOL/L (ref 135–145)
WBC # BLD AUTO: 8.7 K/UL (ref 4.8–10.8)

## 2022-06-22 PROCEDURE — 700102 HCHG RX REV CODE 250 W/ 637 OVERRIDE(OP): Performed by: HOSPITALIST

## 2022-06-22 PROCEDURE — 700102 HCHG RX REV CODE 250 W/ 637 OVERRIDE(OP): Performed by: INTERNAL MEDICINE

## 2022-06-22 PROCEDURE — 80048 BASIC METABOLIC PNL TOTAL CA: CPT

## 2022-06-22 PROCEDURE — 82962 GLUCOSE BLOOD TEST: CPT | Mod: 91

## 2022-06-22 PROCEDURE — A9270 NON-COVERED ITEM OR SERVICE: HCPCS | Performed by: HOSPITALIST

## 2022-06-22 PROCEDURE — 700111 HCHG RX REV CODE 636 W/ 250 OVERRIDE (IP): Performed by: HOSPITALIST

## 2022-06-22 PROCEDURE — 83735 ASSAY OF MAGNESIUM: CPT

## 2022-06-22 PROCEDURE — 85025 COMPLETE CBC W/AUTO DIFF WBC: CPT

## 2022-06-22 PROCEDURE — 770020 HCHG ROOM/CARE - TELE (206)

## 2022-06-22 PROCEDURE — A9270 NON-COVERED ITEM OR SERVICE: HCPCS | Performed by: INTERNAL MEDICINE

## 2022-06-22 PROCEDURE — 36415 COLL VENOUS BLD VENIPUNCTURE: CPT

## 2022-06-22 PROCEDURE — 99233 SBSQ HOSP IP/OBS HIGH 50: CPT | Performed by: HOSPITALIST

## 2022-06-22 PROCEDURE — 700111 HCHG RX REV CODE 636 W/ 250 OVERRIDE (IP): Performed by: INTERNAL MEDICINE

## 2022-06-22 RX ORDER — FUROSEMIDE 10 MG/ML
20 INJECTION INTRAMUSCULAR; INTRAVENOUS
Status: DISCONTINUED | OUTPATIENT
Start: 2022-06-22 | End: 2022-06-22

## 2022-06-22 RX ORDER — FUROSEMIDE 20 MG/1
20 TABLET ORAL
Status: DISCONTINUED | OUTPATIENT
Start: 2022-06-22 | End: 2022-06-23 | Stop reason: HOSPADM

## 2022-06-22 RX ADMIN — ASPIRIN 81 MG: 81 TABLET, COATED ORAL at 06:09

## 2022-06-22 RX ADMIN — INSULIN HUMAN 5 UNITS: 100 INJECTION, SOLUTION PARENTERAL at 20:38

## 2022-06-22 RX ADMIN — THERA TABS 1 TABLET: TAB at 06:09

## 2022-06-22 RX ADMIN — ATORVASTATIN CALCIUM 80 MG: 80 TABLET, FILM COATED ORAL at 20:33

## 2022-06-22 RX ADMIN — ACETAMINOPHEN 650 MG: 325 TABLET ORAL at 23:20

## 2022-06-22 RX ADMIN — DAPAGLIFLOZIN 10 MG: 10 TABLET, FILM COATED ORAL at 06:13

## 2022-06-22 RX ADMIN — INSULIN HUMAN 2 UNITS: 100 INJECTION, SOLUTION PARENTERAL at 08:18

## 2022-06-22 RX ADMIN — FUROSEMIDE 40 MG: 10 INJECTION, SOLUTION INTRAMUSCULAR; INTRAVENOUS at 06:20

## 2022-06-22 RX ADMIN — CLOPIDOGREL BISULFATE 75 MG: 75 TABLET ORAL at 06:09

## 2022-06-22 RX ADMIN — INSULIN HUMAN 2 UNITS: 100 INJECTION, SOLUTION PARENTERAL at 17:16

## 2022-06-22 RX ADMIN — Medication 100 MG: at 06:09

## 2022-06-22 RX ADMIN — ENOXAPARIN SODIUM 40 MG: 40 INJECTION SUBCUTANEOUS at 17:20

## 2022-06-22 RX ADMIN — ACETAMINOPHEN 650 MG: 325 TABLET ORAL at 15:19

## 2022-06-22 RX ADMIN — INSULIN HUMAN 3 UNITS: 100 INJECTION, SOLUTION PARENTERAL at 11:34

## 2022-06-22 RX ADMIN — SACUBITRIL AND VALSARTAN 1 TABLET: 97; 103 TABLET, FILM COATED ORAL at 06:21

## 2022-06-22 RX ADMIN — SPIRONOLACTONE 25 MG: 25 TABLET, FILM COATED ORAL at 06:17

## 2022-06-22 RX ADMIN — SACUBITRIL AND VALSARTAN 1 TABLET: 97; 103 TABLET, FILM COATED ORAL at 17:17

## 2022-06-22 RX ADMIN — FOLIC ACID 1 MG: 1 TABLET ORAL at 06:09

## 2022-06-22 ASSESSMENT — LIFESTYLE VARIABLES
ANXIETY: NO ANXIETY (AT EASE)
AUDITORY DISTURBANCES: NOT PRESENT
TREMOR: NO TREMOR
NAUSEA AND VOMITING: NO NAUSEA AND NO VOMITING
AGITATION: *
HEADACHE, FULLNESS IN HEAD: NOT PRESENT
VISUAL DISTURBANCES: NOT PRESENT
PAROXYSMAL SWEATS: NO SWEAT VISIBLE
ORIENTATION AND CLOUDING OF SENSORIUM: ORIENTED AND CAN DO SERIAL ADDITIONS
SUBSTANCE_ABUSE: 0
TOTAL SCORE: 2

## 2022-06-22 ASSESSMENT — ENCOUNTER SYMPTOMS
NAUSEA: 0
NEUROLOGICAL NEGATIVE: 1
PALPITATIONS: 0
COUGH: 0
CHILLS: 0
DIZZINESS: 0
WEIGHT LOSS: 0
MYALGIAS: 0
PSYCHIATRIC NEGATIVE: 1
ABDOMINAL PAIN: 0
BRUISES/BLEEDS EASILY: 0
VOMITING: 0
DIAPHORESIS: 0
HEADACHES: 0
SORE THROAT: 0
EYES NEGATIVE: 1
CONSTITUTIONAL NEGATIVE: 1
GASTROINTESTINAL NEGATIVE: 1
DEPRESSION: 0
FEVER: 0
WEAKNESS: 0
BLURRED VISION: 0
ORTHOPNEA: 1
FOCAL WEAKNESS: 0
MUSCULOSKELETAL NEGATIVE: 1
SHORTNESS OF BREATH: 1

## 2022-06-22 NOTE — PROGRESS NOTES
Hospital Medicine Daily Progress Note    Date of Service  6/22/2022    Chief Complaint  Tucker Frederick is a 66 y.o. male admitted 6/19/2022 with shortness of breath    Hospital Course  Patient is a 66-year-old male with a past medical history of CAD with  of RCA and OM 2, CHF with systolic dysfunction and EF 30%, diabetes mellitus type 2, hypertension, hyperlipidemia, methamphetamine abuse who presented with shortness of breath.  Patient's stated he recently started smoking crack cocaine again.  He was evaluated by his cardiologist Dr. Vinh Arevalo in clinic on 6/1/2022 and is being considered for PCI of his  and ICD placement.  He was previously seen by CT surgery and was considered not a candidate for CABG.    Interval Problem Update  He remains axox3, sob much improved and now stable on 2L, no cough. Mild hypotension overnight and this am - he denies associated dizziness/ or associated sx - will stop coreg, will also check orthostatics. No chest pain. ROS otherwise negative, no s/o withdrawal. Mild decrease in h/h, no melena etc, suspect due to blood draws and hemodilution - following    I have discussed this patient's plan of care and discharge plan at IDT rounds today with Case Management, Nursing, Nursing leadership, and other members of the IDT team.    Consultants/Specialty  cardiology    Code Status  Full Code    Disposition  Patient is not medically cleared for discharge.   Anticipate discharge to to home with close outpatient follow-up.  I have placed the appropriate orders for post-discharge needs.    Review of Systems  Review of Systems   Constitutional: Negative.  Negative for chills, diaphoresis, fever, malaise/fatigue and weight loss.   HENT: Negative.  Negative for sore throat.    Eyes: Negative.  Negative for blurred vision.   Respiratory: Positive for shortness of breath. Negative for cough.    Cardiovascular: Positive for orthopnea and leg swelling (improving). Negative for chest pain  and palpitations.   Gastrointestinal: Negative.  Negative for abdominal pain, nausea and vomiting.   Genitourinary: Negative.  Negative for dysuria.   Musculoskeletal: Negative.  Negative for myalgias.   Skin: Negative.  Negative for itching and rash.   Neurological: Negative.  Negative for dizziness, focal weakness, weakness and headaches.   Endo/Heme/Allergies: Negative.  Does not bruise/bleed easily.   Psychiatric/Behavioral: Negative.  Negative for depression, substance abuse and suicidal ideas.   All other systems reviewed and are negative.       Physical Exam  Temp:  [36.1 °C (97 °F)-37.2 °C (99 °F)] 36.1 °C (97 °F)  Pulse:  [70-81] 71  Resp:  [16-18] 16  BP: ()/(44-64) 102/62  SpO2:  [92 %-96 %] 96 %    Physical Exam  Vitals and nursing note reviewed.   Constitutional:       General: He is not in acute distress.     Appearance: Normal appearance.   HENT:      Head: Normocephalic and atraumatic.      Nose: Nose normal.      Mouth/Throat:      Mouth: Mucous membranes are moist.   Eyes:      Extraocular Movements: Extraocular movements intact.      Conjunctiva/sclera: Conjunctivae normal.      Pupils: Pupils are equal, round, and reactive to light.   Cardiovascular:      Rate and Rhythm: Normal rate and regular rhythm.      Pulses: Normal pulses.      Heart sounds: Normal heart sounds.   Pulmonary:      Effort: No respiratory distress.      Breath sounds: No wheezing, rhonchi or rales (bibasilar).   Abdominal:      General: Bowel sounds are normal. There is no distension.      Palpations: Abdomen is soft.      Tenderness: There is no abdominal tenderness.   Musculoskeletal:         General: No swelling or tenderness. Normal range of motion.      Cervical back: Normal range of motion and neck supple.   Lymphadenopathy:      Cervical: No cervical adenopathy.   Skin:     General: Skin is warm.      Coloration: Skin is not jaundiced.      Findings: No rash.   Neurological:      General: No focal deficit  present.      Mental Status: He is alert and oriented to person, place, and time.      Cranial Nerves: No cranial nerve deficit.      Motor: No weakness.   Psychiatric:         Mood and Affect: Mood normal.         Behavior: Behavior normal.         Fluids    Intake/Output Summary (Last 24 hours) at 6/22/2022 1329  Last data filed at 6/22/2022 1120  Gross per 24 hour   Intake 300 ml   Output 800 ml   Net -500 ml       Laboratory  Recent Labs     06/19/22 2245 06/21/22  0149 06/22/22  0217   WBC 19.2* 12.7* 8.7   RBC 4.71 4.34* 4.14*   HEMOGLOBIN 14.8 13.4* 12.6*   HEMATOCRIT 44.1 40.0* 38.1*   MCV 93.6 92.2 92.0   MCH 31.4 30.9 30.4   MCHC 33.6* 33.5* 33.1*   RDW 44.0 44.1 43.7   PLATELETCT 531* 385 320   MPV 9.8 10.0 10.1     Recent Labs     06/19/22 2245 06/20/22  0102 06/21/22  0149 06/22/22  0217   SODIUM 136  --  139 138   POTASSIUM 6.2* 5.7* 4.1 3.7   CHLORIDE 97  --  97 96   CO2 17*  --  27 26   GLUCOSE 381*  --  157* 161*   BUN 31*  --  34* 45*   CREATININE 1.63*  --  1.45* 1.67*   CALCIUM 9.1  --  9.3 8.8     Recent Labs     06/20/22  0209   APTT 24.5*   INR 1.06         Recent Labs     06/21/22  0149   TRIGLYCERIDE 205*   HDL 46   LDL 80       Imaging  EC-ECHOCARDIOGRAM COMPLETE W/O CONT   Final Result      DX-CHEST-LIMITED (1 VIEW)   Final Result      1.  Enlarged cardiac silhouette with probable changes of vascular congestion/edema.   2.  Hazy left lower lobe opacity could represent developing pneumonitis.           Assessment/Plan  * NSTEMI (non-ST elevated myocardial infarction) (HCC)- (present on admission)  Assessment & Plan  Type II NSTEMI secondary to crack cocaine abuse.  Beta-blocker held.    Aspirin, atorvastatin, nitrates   follow-up serial troponin, EKG and telemetry monitoring  Cardiology consulted, did not recommend intervention due to ongoing methamphetamine abuse    Leukocytosis  Assessment & Plan  Likely stress reaction  Resolved      Acute renal failure (ARF) (HCC)- (present on  admission)  Assessment & Plan  Likely secondary to crack cocaine versus cardiorenal.  Avoid nephrotoxic meds and doses,   follow-up urinalysis and repeat BMP  Improving  Will titrate down dose of lasix due to hypotension and clinical improvement  following    Hyperkalemia  Assessment & Plan  Without peaked T waves, hyperkalemia order set deployed.  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  Optimize rate and pressure  Repeat echo shows that EF has actually improved to 40%    Alcohol dependency (HCC)- (present on admission)  Assessment & Plan  CIWA protocol  No s/o withdraw  Cessation discussed and recommended       VTE prophylaxis: heparin ppx    I have performed a physical exam and reviewed and updated ROS and Plan today (6/22/2022). In review of yesterday's note (6/21/2022), there are no changes except as documented above.

## 2022-06-22 NOTE — CARE PLAN
The patient is Watcher - Medium risk of patient condition declining or worsening    Shift Goals  Clinical Goals: Pt will be hemodynamically stable  Patient Goals: Rest  Family Goals: THOM    Progress made toward(s) clinical / shift goals:    Problem: Risk for Aspiration  Goal: Patient's risk for aspiration will be absent or decrease  Outcome: Progressing     Problem: Optimal Care for Alcohol Withdrawal  Goal: Optimal Care for the alcohol withdrawal patient  Outcome: Progressing     Problem: Knowledge Deficit - Standard  Goal: Patient and family/care givers will demonstrate understanding of plan of care, disease process/condition, diagnostic tests and medications  Outcome: Progressing     Problem: Fall Risk  Goal: Patient will remain free from falls  Outcome: Progressing     Problem: Pain - Standard  Goal: Alleviation of pain or a reduction in pain to the patient’s comfort goal  Outcome: Progressing       Patient is not progressing towards the following goals:

## 2022-06-22 NOTE — PROGRESS NOTES
Received care of pt from NOC RN this am. Pt is A+O x 4. Denies need for pain medication. Orthostatic VS done. Discussed pt's orthostatic VS's, pt's care with Hospitalist Dr. Fontanez.

## 2022-06-22 NOTE — PROGRESS NOTES
BP taken, BP low, see doc flowsheets. Pt questioned, pt asymptomatic. Communicated BP to hospitalist Dr. Fontanez.

## 2022-06-22 NOTE — DISCHARGE PLANNING
Care Transition Team Assessment    Spoke with the pt at bedside. Pt presents as AAO x 4. He is able to make his needs known. Pt confirmed his home address and phone number as per FS. Pt lives with a friend Cornelia Lam. Pt [facility] as [facility]. PLOF is independent with all ADLs. Pt states he will be able to arrange for DC transportation when is medically cleared. Pt reports he lives in a H with 2 RUT. Pt reports he has no assistive DMEs. States that he is hoping to get home O2 if not able to be weaned off O2 during this admission.   Pt reports that he is not service connected with [facility].   Pt confirmed that his insurance with [facility] and Medicare A.     Information Source  Orientation Level: Oriented X4  Information Given By: Patient  Informant's Name: Tucker Frederick  Who is responsible for making decisions for patient? : Patient    Readmission Evaluation  Is this a readmission?: No    Elopement Risk  Legal Hold: No  Ambulatory or Self Mobile in Wheelchair: Yes  Disoriented: No  Psychiatric Symptoms: None  History of Wandering: No  Elopement this Admit: No  Vocalizing Wanting to Leave: No  Displays Behaviors, Body Language Wanting to Leave: No-Not at Risk for Elopement  Elopement Risk: Not at Risk for Elopement    Interdisciplinary Discharge Planning  Patient or legal guardian wants to designate a caregiver: No    Discharge Preparedness  What is your plan after discharge?: Home with help  What are your discharge supports?: Other (comment)  Prior Functional Level: Ambulatory, Drives Self, Independent with Activities of Daily Living, Independent with Medication Management  Difficulity with ADLs: None  Difficulity with IADLs: None    Functional Assesment  Prior Functional Level: Ambulatory, Drives Self, Independent with Activities of Daily Living, Independent with Medication Management    Finances  Financial Barriers to Discharge: No  Prescription Coverage: Yes    Vision / Hearing  Impairment  Vision Impairment : No  Hearing Impairment : No         Advance Directive  Advance Directive?: None  Advance Directive offered?: AD Booklet given    Domestic Abuse  Have you ever been the victim of abuse or violence?: No  Physical Abuse or Sexual Abuse: No  Verbal Abuse or Emotional Abuse: No  Possible Abuse/Neglect Reported to:: Not Applicable    Psychological Assessment  History of Substance Abuse: Methamphetamine  History of Psychiatric Problems: No  Non-compliant with Treatment: No  Newly Diagnosed Illness: No    Discharge Risks or Barriers  Discharge risks or barriers?: No    Anticipated Discharge Information  Discharge Disposition: Discharged to home/self care (01)  Discharge Address: [address] [place] [id number]  Discharge Contact Phone Number: [phone]

## 2022-06-22 NOTE — DISCHARGE PLANNING
Case Management Discharge Planning    Admission Date: 6/19/2022  GMLOS: 4.1  ALOS: 2    6-Clicks ADL Score: 24  6-Clicks Mobility Score: 24      Anticipated Discharge Dispo: Discharge Disposition: Discharged to home/self care (01)  Discharge Address: [address]  [place] [address]  Discharge Contact Phone Number: [phone]    DME Needed: pending, may need home O2.     Action(s) Taken: Pt was discussed in IDR. Pt is not stable for DC today. Pt cont to be on 2LNC. Per MD, cont to wean off O2 and will re-eval for Home O2 tomorrow. Pt is with orthostatic hypotension.     Escalations Completed: None    Medically Clear: No    Next Steps: cont to f/u with med team for med clearance.     Barriers to Discharge: Medical clearance    Is the patient up for discharge tomorrow: No

## 2022-06-22 NOTE — ASSESSMENT & PLAN NOTE
See above  Hypotension this am, did not tolerated coreg so stopped  Decrease dose of lasix  Continue to follow

## 2022-06-23 VITALS
HEART RATE: 85 BPM | WEIGHT: 164.68 LBS | DIASTOLIC BLOOD PRESSURE: 57 MMHG | HEIGHT: 66 IN | BODY MASS INDEX: 26.47 KG/M2 | TEMPERATURE: 97.5 F | RESPIRATION RATE: 17 BRPM | OXYGEN SATURATION: 91 % | SYSTOLIC BLOOD PRESSURE: 87 MMHG

## 2022-06-23 PROBLEM — N17.9 ACUTE RENAL FAILURE (ARF) (HCC): Status: RESOLVED | Noted: 2022-06-20 | Resolved: 2022-06-23

## 2022-06-23 PROBLEM — E87.5 HYPERKALEMIA: Status: RESOLVED | Noted: 2022-06-20 | Resolved: 2022-06-23

## 2022-06-23 PROBLEM — D72.829 LEUKOCYTOSIS: Status: RESOLVED | Noted: 2022-06-20 | Resolved: 2022-06-23

## 2022-06-23 LAB
ANION GAP SERPL CALC-SCNC: 13 MMOL/L (ref 7–16)
BASOPHILS # BLD AUTO: 0.6 % (ref 0–1.8)
BASOPHILS # BLD: 0.04 K/UL (ref 0–0.12)
BUN SERPL-MCNC: 51 MG/DL (ref 8–22)
CALCIUM SERPL-MCNC: 8.8 MG/DL (ref 8.5–10.5)
CHLORIDE SERPL-SCNC: 99 MMOL/L (ref 96–112)
CO2 SERPL-SCNC: 25 MMOL/L (ref 20–33)
CREAT SERPL-MCNC: 1.52 MG/DL (ref 0.5–1.4)
EOSINOPHIL # BLD AUTO: 0.13 K/UL (ref 0–0.51)
EOSINOPHIL NFR BLD: 1.9 % (ref 0–6.9)
ERYTHROCYTE [DISTWIDTH] IN BLOOD BY AUTOMATED COUNT: 43.8 FL (ref 35.9–50)
GFR SERPLBLD CREATININE-BSD FMLA CKD-EPI: 50 ML/MIN/1.73 M 2
GLUCOSE BLD STRIP.AUTO-MCNC: 165 MG/DL (ref 65–99)
GLUCOSE SERPL-MCNC: 147 MG/DL (ref 65–99)
HCT VFR BLD AUTO: 37.5 % (ref 42–52)
HGB BLD-MCNC: 12.6 G/DL (ref 14–18)
IMM GRANULOCYTES # BLD AUTO: 0.02 K/UL (ref 0–0.11)
IMM GRANULOCYTES NFR BLD AUTO: 0.3 % (ref 0–0.9)
LYMPHOCYTES # BLD AUTO: 1.5 K/UL (ref 1–4.8)
LYMPHOCYTES NFR BLD: 22.4 % (ref 22–41)
MCH RBC QN AUTO: 31.3 PG (ref 27–33)
MCHC RBC AUTO-ENTMCNC: 33.6 G/DL (ref 33.7–35.3)
MCV RBC AUTO: 93.1 FL (ref 81.4–97.8)
MONOCYTES # BLD AUTO: 0.89 K/UL (ref 0–0.85)
MONOCYTES NFR BLD AUTO: 13.3 % (ref 0–13.4)
NEUTROPHILS # BLD AUTO: 4.12 K/UL (ref 1.82–7.42)
NEUTROPHILS NFR BLD: 61.5 % (ref 44–72)
NRBC # BLD AUTO: 0 K/UL
NRBC BLD-RTO: 0 /100 WBC
PLATELET # BLD AUTO: 321 K/UL (ref 164–446)
PMV BLD AUTO: 9.9 FL (ref 9–12.9)
POTASSIUM SERPL-SCNC: 3.6 MMOL/L (ref 3.6–5.5)
RBC # BLD AUTO: 4.03 M/UL (ref 4.7–6.1)
SODIUM SERPL-SCNC: 137 MMOL/L (ref 135–145)
WBC # BLD AUTO: 6.7 K/UL (ref 4.8–10.8)

## 2022-06-23 PROCEDURE — 99239 HOSP IP/OBS DSCHRG MGMT >30: CPT | Performed by: HOSPITALIST

## 2022-06-23 PROCEDURE — A9270 NON-COVERED ITEM OR SERVICE: HCPCS | Performed by: INTERNAL MEDICINE

## 2022-06-23 PROCEDURE — A9270 NON-COVERED ITEM OR SERVICE: HCPCS | Performed by: HOSPITALIST

## 2022-06-23 PROCEDURE — 82962 GLUCOSE BLOOD TEST: CPT

## 2022-06-23 PROCEDURE — 85025 COMPLETE CBC W/AUTO DIFF WBC: CPT

## 2022-06-23 PROCEDURE — 36415 COLL VENOUS BLD VENIPUNCTURE: CPT

## 2022-06-23 PROCEDURE — 700102 HCHG RX REV CODE 250 W/ 637 OVERRIDE(OP): Performed by: INTERNAL MEDICINE

## 2022-06-23 PROCEDURE — 80048 BASIC METABOLIC PNL TOTAL CA: CPT

## 2022-06-23 PROCEDURE — 700102 HCHG RX REV CODE 250 W/ 637 OVERRIDE(OP): Performed by: HOSPITALIST

## 2022-06-23 RX ORDER — FUROSEMIDE 20 MG/1
20 TABLET ORAL DAILY
Qty: 30 TABLET | Refills: 0 | Status: SHIPPED | OUTPATIENT
Start: 2022-06-23 | End: 2022-07-12 | Stop reason: SDUPTHER

## 2022-06-23 RX ADMIN — DAPAGLIFLOZIN 10 MG: 10 TABLET, FILM COATED ORAL at 06:49

## 2022-06-23 RX ADMIN — THERA TABS 1 TABLET: TAB at 05:39

## 2022-06-23 RX ADMIN — FOLIC ACID 1 MG: 1 TABLET ORAL at 05:39

## 2022-06-23 RX ADMIN — Medication 100 MG: at 05:39

## 2022-06-23 RX ADMIN — CLOPIDOGREL BISULFATE 75 MG: 75 TABLET ORAL at 05:39

## 2022-06-23 RX ADMIN — INSULIN HUMAN 2 UNITS: 100 INJECTION, SOLUTION PARENTERAL at 08:05

## 2022-06-23 RX ADMIN — ASPIRIN 81 MG: 81 TABLET, COATED ORAL at 05:39

## 2022-06-23 ASSESSMENT — FIBROSIS 4 INDEX: FIB4 SCORE: 1.94

## 2022-06-23 ASSESSMENT — PAIN DESCRIPTION - PAIN TYPE: TYPE: ACUTE PAIN

## 2022-06-23 NOTE — PROGRESS NOTES
Pt discharged to home with relative. Discharge instructions reviewed, and signed. IV and tele monitor removed. Pt escorted out with DEBRA Crum.

## 2022-06-23 NOTE — PROGRESS NOTES
Report received from Rn. Assumed pt care. Pt is resting in bed on RA. Pt A&O x 4. Fall precautions in place, call light and belongings within reach, bed in lowest position. No signs of distress.

## 2022-06-23 NOTE — CARE PLAN
The patient is Stable - Low risk of patient condition declining or worsening    Shift Goals  Clinical Goals: monitor orthostatic bp  Patient Goals: rest  Family Goals: meche    Progress made toward(s) clinical / shift goals:      Patient is not progressing towards the following goals:      Problem: Pain - Standard  Goal: Alleviation of pain or a reduction in pain to the patient’s comfort goal  Outcome: Not Progressing  Note: Patient has some pain/ discomfort in his back. Patient medicated per MAR.

## 2022-06-23 NOTE — DISCHARGE INSTRUCTIONS
Discharge Instructions    Discharged to home by car with relative. Discharged via wheelchair, hospital escort: Yes.  Special equipment needed: Not Applicable    Be sure to schedule a follow-up appointment with your primary care doctor or any specialists as instructed.     Discharge Plan:        I understand that a diet low in cholesterol, fat, and sodium is recommended for good health. Unless I have been given specific instructions below for another diet, I accept this instruction as my diet prescription.   Other diet: 1200 ml fluid restriction, low sodium    Special Instructions:   HF Patient Discharge Instructions  Monitor your weight daily, and maintain a weight chart, to track your weight changes.   Activity as tolerated, unless your Doctor has ordered otherwise. Other activity order: as tolerated.  Follow a low fat, low cholesterol, low salt diet unless instructed otherwise by your Doctor. Read the labels on the back of food products and track your intake of fat, cholesterol and salt.   Fluid Restriction Yes. If a Fluid Restriction has been ordered by your Doctor, measure fluids with a measuring cup to ensure that you are not exceeding the restriction.   No smoking.  Oxygen No. If your Doctor has ordered that you wear Oxygen at home, it is important to wear it as ordered.  Did you receive an explanation from staff on the importance of taking each of your medications and why it is necessary to keep taking them unless your doctor says to stop? Yes  Were all of your questions answered about how to manage your heart failure and what to do if you have increased signs and symptoms after you go home? Yes  Do you feel like your heart failure care team involved you in the care treatment plan and allowed you to make decisions regarding your care while in the hospital and addressed any discharge needs you might have? Yes    See the educational handout provided at discharge for more information on monitoring your daily  weight, activity and diet. This also explains more about Heart Failure, symptoms of a flare-up and some of the tests that you have undergone.     Warning Signs of a Flare-Up include:  Swelling in the ankles or lower legs.  Shortness of breath, while at rest, or while doing normal activities.   Shortness of breath at night when in bed, or coughing in bed.   Requiring more pillows to sleep at night, or needing to sit up at night to sleep.  Feeling weak, dizzy or fatigued.     When to call your Doctor:  Call [facility] seven days a week from 8:00 a.m. to 8:00 p.m. for medical questions [phone].  Call your Primary Care Physician or Cardiologist if:   You experience any pain radiating to your jaw or neck.  You have any difficulty breathing.  You experience weight gain of 3 lbs in a day or 5 lbs in a week.   You feel any palpitations or irregular heartbeats.  You become dizzy or lose consciousness.   If you have had an angiogram or had a pacemaker or AICD placed, and experience:  Bleeding, drainage or swelling at the surgical / puncture site.  Fever greater than 100.0 F  Shock from internal defibrillator.  Cool and / or numb extremities.    Is patient discharged on Warfarin / Coumadin?   No     Depression / Suicide Risk    As you are discharged from this [facility], it is important to learn how to keep safe from harming yourself.    Recognize the warning signs:  Abrupt changes in personality, positive or negative- including increase in energy   Giving away possessions  Change in eating patterns- significant weight changes-  positive or negative  Change in sleeping patterns- unable to sleep or sleeping all the time   Unwillingness or inability to communicate  Depression  Unusual sadness, discouragement and loneliness  Talk of wanting to die  Neglect of personal appearance   Rebelliousness- reckless behavior  Withdrawal from people/activities they love  Confusion- inability to  concentrate     If you or a loved one observes any of these behaviors or has concerns about self-harm, here's what you can do:  Talk about it- your feelings and reasons for harming yourself  Remove any means that you might use to hurt yourself (examples: pills, rope, extension cords, firearm)  Get professional help from the community (Mental Health, Substance Abuse, psychological counseling)  Do not be alone:Call your Safe Contact- someone whom you trust who will be there for you.  Call your local CRISIS HOTLINE 725-7540 or 325-335-7285  Call your local Children's Mobile Crisis Response Team [place] (247) 632-8069 or www.Medigram  Call the toll free National Suicide Prevention Hotlines   National Suicide Prevention Lifeline 774-952-QNNH (3568)  National Hope Line Network 800-SUICIDE (460-2723)

## 2022-06-23 NOTE — DISCHARGE SUMMARY
"Discharge Summary    CHIEF COMPLAINT ON ADMISSION  Chief Complaint   Patient presents with   • Shortness of Breath     BIBA for SOB. Increased SOB starting today at 1000. Per EMS Pt was 75% on 5L at home. EMS gave 40 mg lasix, 0.5\" nitro, Pt also took his own 40mg of lasix. Pt arrived on CPAP.       Reason for Admission  EMS     Admission Date  6/19/2022    CODE STATUS  Full Code    HPI & HOSPITAL COURSE  Patient is a 66-year-old male with a past medical history of CAD with  of RCA and OM 2, CHF with systolic dysfunction and EF 30%, diabetes mellitus type 2, hypertension, hyperlipidemia, methamphetamine abuse who presented with shortness of breath.  Patient's stated he recently started smoking crack cocaine again.  He was evaluated by his cardiologist Dr. Vinh Arevalo in clinic on 6/1/2022 and is being considered for PCI of his  and ICD placement.  He was previously seen by CT surgery and was considered not a candidate for CABG.  Here he was found to have an acute NSTEMI. Cardiology was consulted and did not recommend intervention until he can maintain sobriety. He also had acute on chronic mixed CHF exacerbation with volume overload. He responded well to diuresis and his acute hypoxic respiratory failure (that was related to pulmonary edema) resolved. Unfortunately, patient could not tolerate his beta blocker. His previous dose of coreg 25 was initially stopped due to hypotension, and I attempted to re introduce a low dose of coreg at 3.125 but he developed symptomatic hypotension on this dose. We discussed the importance of home blood pressure monitoring and [facility] and the importance of restarting coreg when tolerated.  We also had extensive conversations about cessation and strategies of illicit drug use, alcohol and nicotine use. [facility] to [facility] and is motivated to achieve sobriety.  His acute renal failure was likely related to " his crack cocaine use and secondary vasoconstriction, this has improved and he agrees to close outpatient follow up of this as well.   At time of discharge, he denies chest pain, sob and he is stable on room air. [facility] and agrees to return to the ER if needed.     Therefore, he is discharged in fair and stable condition to home with close outpatient follow-up.    The patient met 2-midnight criteria for an inpatient stay at the time of discharge.    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 [facility] None     [place] [id number]  Go on 6/27/2022  Please go to your follow up appointment on Monday June 27, 2022 on 2:00pm.      MEDICATIONS ON DISCHARGE     Medication List      START taking these medications      Instructions   furosemide 20 MG Tabs  Commonly known as: LASIX   Doctor's comments: Hold sbp less 105  Take 1 Tablet by mouth every day.  Dose: 20 mg        CONTINUE taking these medications      Instructions   atorvastatin 80 MG tablet  Commonly known as: LIPITOR   Take 1 Tablet by mouth every evening.  Dose: 80 mg     clopidogrel 75 MG Tabs  Commonly known as: PLAVIX   Take 1 Tablet by mouth every day.  Dose: 75 mg     Empagliflozin 25 MG Tabs   Take 25 mg by mouth every day.  Dose: 25  mg     Entresto  MG Tabs tablet  Generic drug: sacubitril-valsartan   Take 1 Tablet by mouth 2 times a day.  Dose: 1 Tablet     ferrous gluconate 324 (38 Fe) MG Tabs  Commonly known as: FERGON   Take 324 mg by mouth every morning with breakfast.  Dose: 324 mg     metformin 1000 MG tablet  Commonly known as: GLUCOPHAGE   Take 1,000 mg by mouth 2 times a day with meals.  Dose: 1,000 mg     MULTIVITAMIN ADULT PO   Take  by mouth.     Semaglutide(0.25 or 0.5MG/DOS) 2 MG/1.5ML Sopn      spironolactone 25 MG Tabs  Commonly known as: ALDACTONE   Take 1 Tablet by mouth every day.  Dose: 25 mg     traZODone 100 MG Tabs  Commonly known as: DESYREL   Take 100 mg by mouth every evening.  Dose: 100 mg     VITAMIN B 12 PO   Take 1 Tablet by mouth every day.  Dose: 1 Tablet        STOP taking these medications    carvedilol 25 MG Tabs  Commonly known as: COREG            Allergies  No Known Allergies    DIET  Orders Placed This Encounter   Procedures   • Diet Order Diet: 2 Gram Sodium; Second Modifier: (optional): Consistent CHO (Diabetic); Fluid modifications: (optional): 1200 ml Fluid Restriction     Standing Status:   Standing     Number of Occurrences:   1     Order Specific Question:   Diet:     Answer:   2 Gram Sodium [7]     Order Specific Question:   Second Modifier: (optional)     Answer:   Consistent CHO (Diabetic) [4]     Order Specific Question:   Fluid modifications: (optional)     Answer:   1200 ml Fluid Restriction [8]       ACTIVITY  As tolerated.  Weight bearing as tolerated    CONSULTATIONS  [facility]    PROCEDURES  EC-ECHOCARDIOGRAM COMPLETE W/O CONT   Final Result      DX-CHEST-LIMITED (1 VIEW)   Final Result      1.  Enlarged cardiac silhouette with probable changes of vascular congestion/edema.   2.  Hazy left lower lobe opacity could represent developing pneumonitis.            LABORATORY  Lab Results   Component Value Date    SODIUM 137 06/23/2022    POTASSIUM 3.6 06/23/2022    CHLORIDE 99  06/23/2022    CO2 25 06/23/2022    GLUCOSE 147 (H) 06/23/2022    BUN 51 (H) 06/23/2022    CREATININE 1.52 (H) 06/23/2022        Lab Results   Component Value Date    WBC 6.7 06/23/2022    HEMOGLOBIN 12.6 (L) 06/23/2022    HEMATOCRIT 37.5 (L) 06/23/2022    PLATELETCT 321 06/23/2022        Total time of the discharge process exceeds 45 minutes.

## 2022-06-23 NOTE — PROGRESS NOTES
Assumed care this pm from day shift RN. Patient sitting up in the bed with no signs of distress. Patient AxO 4, O2 @ 3 L thru oxy mask, VSS. Call bell and personal items within reach, bed locked in low position. Bed exit alarm on. Hourly rounding in place. Tele box in place, monitor room notified.

## 2022-06-23 NOTE — CARE PLAN
The patient is Stable - Low risk of patient condition declining or worsening    Shift Goals  Clinical Goals: treatment for CHF  Patient Goals: pt education, fulfill pt needs.  Family Goals: meche    Progress made toward(s) clinical / shift goals:  receiving TX for CHF, pt's education in progress. CIWA = O. BP improved, see doc flow sheets.       Problem: Care Map:  Day 3 Optimal Outcome for the Heart Failure Patient  Goal: Day 3:  Optimal Care of the heart failure patient  Outcome: Progressing     Problem: Knowledge Deficit - Standard  Goal: Patient and family/care givers will demonstrate understanding of plan of care, disease process/condition, diagnostic tests and medications  Outcome: Progressing     Problem: Lifestyle Changes  Goal: Patient's ability to identify lifestyle changes and available resources to help reduce recurrence of condition will improve  Outcome: Progressing     Problem: Psychosocial  Goal: Patient's level of anxiety will decrease  Outcome: Progressing     Problem: Risk for Aspiration  Goal: Patient's risk for aspiration will be absent or decrease  Outcome: Progressing     Problem: Fall Risk  Goal: Patient will remain free from falls  Outcome: Progressing     Problem: Pain - Standard  Goal: Alleviation of pain or a reduction in pain to the patient’s comfort goal  Outcome: Progressing

## 2022-06-29 ENCOUNTER — PATIENT MESSAGE (OUTPATIENT)
Dept: CARDIOLOGY | Facility: MEDICAL CENTER | Age: 67
End: 2022-06-29
Payer: COMMERCIAL

## 2022-06-30 ENCOUNTER — HOSPITAL ENCOUNTER (OUTPATIENT)
Dept: RADIOLOGY | Facility: MEDICAL CENTER | Age: 67
End: 2022-06-30
Attending: INTERNAL MEDICINE
Payer: COMMERCIAL

## 2022-06-30 DIAGNOSIS — I25.5 ISCHEMIC CARDIOMYOPATHY: ICD-10-CM

## 2022-06-30 PROCEDURE — 700117 HCHG RX CONTRAST REV CODE 255: Performed by: INTERNAL MEDICINE

## 2022-06-30 PROCEDURE — 75561 CARDIAC MRI FOR MORPH W/DYE: CPT

## 2022-06-30 PROCEDURE — A9577 INJ MULTIHANCE: HCPCS | Performed by: INTERNAL MEDICINE

## 2022-06-30 RX ADMIN — GADOBENATE DIMEGLUMINE 16 ML: 529 INJECTION, SOLUTION INTRAVENOUS at 14:56

## 2022-06-30 NOTE — PROGRESS NOTES
No, I would not do any of this unless you are having burdensome symptoms such as lightheadedness.    If asymptomatic, it is completely normal to have BPs in the 90/50s and your medications should not be stopped or reduced in dose simply based on this BP measurement.    If you are having intolerable symptoms, then it is okay to reduce your doses.    Spironolactone generally does not lower BP in patients with heart failure and I would continue this. If you are not having any concerning symptoms then you should also not stop or reduce your carvedilol dose.    If you are not having swelling or weight gain, it is okay to hold off furosemide, but you should restart this if you start to gain weight.

## 2022-07-04 ENCOUNTER — PATIENT MESSAGE (OUTPATIENT)
Dept: CARDIOLOGY | Facility: MEDICAL CENTER | Age: 67
End: 2022-07-04
Payer: COMMERCIAL

## 2022-07-05 ENCOUNTER — PATIENT MESSAGE (OUTPATIENT)
Dept: CARDIOLOGY | Facility: MEDICAL CENTER | Age: 67
End: 2022-07-05
Payer: COMMERCIAL

## 2022-07-05 NOTE — TELEPHONE ENCOUNTER
----- Message from Vinh Arevalo M.D. sent at 7/2/2022 12:39 PM PDT -----  MRI showed some scarring in heart, but overall is consistent with viability. Can you have him come see me Aug 1? Okay to try to squeeze him in. Also, I would like him to start seeing HF clinic as well if he is having issues with taking his meds due to low BP.

## 2022-07-06 NOTE — PATIENT COMMUNICATION
[employer] message sent to pt regarding MD recommendations.    Awaiting pt response.    FYI To MD ISREAL pt to see MANA 7/12, thank you

## 2022-07-06 NOTE — PROGRESS NOTES
Perfect. He needs to establish long-term care with HF clinic. I still need to see him to discuss possible  PCI in August, though.

## 2022-07-11 ASSESSMENT — ENCOUNTER SYMPTOMS
FEVER: 0
PND: 0
ORTHOPNEA: 0
ABDOMINAL PAIN: 0
PALPITATIONS: 0
COUGH: 0
DIZZINESS: 0
CLAUDICATION: 0
MYALGIAS: 0
SHORTNESS OF BREATH: 0

## 2022-07-11 NOTE — PROGRESS NOTES
Chief Complaint   Patient presents with   • Congestive Heart Failure     F/ V DX: Congestive Heart Failure New    • Shortness of Breath     F/V DX: SOB (shortness of breath)        Subjective     Tucker Frederick is a 66 y.o. male who presents today for follow-up on his heart failure after second HF hospitalization on 6/19/2022 after cocaine abuse recurrence. During Patient's initial hospitalization, cardiac catheterization which revealed severe obstructive two-vessel CAD with  of the mid RCA and the ostial second obtuse marginal branch.  CT surgery was consulted and determined patient was not a candidate for CABG. Dr. Arevalo has been following patient closely for potential  procedure.  Patient was last seen by Dr. Arevalo on 6/1/2022.      Today, patient feels well, denies chest pain, shortness of breath, palpitations, dizziness/lightheadedness, orthopnea, PND or Edema.  Patient reports he has not been taking his carvedilol since he has been on vacation last 2 weeks.  Patient also reports he continues to drink 1-3 beers daily we discussed the importance of complete cessation.  Patient reports his weight at home is 164 pounds which has been stable since recent hospital discharge.  Patient reports blood pressure at home has been  systolically; patient denies symptoms with blood pressure in the 90s.  6-minute walk test documented per below compared to April shows increased to 50 m, and [place] quality life scoring improved.    Based on physical examination findings, patient is euvolemic. No JVD, lungs are clear to auscultation, no pitting edema in bilateral lower extremities, no ascites. Dry weight is 164 lbs.    Initial 6 minute walk test (4/2022), patient was able to complete 344 m during his 6 minute walk test.  His O2 saturation at baseline was 97% and at the end of the test, the O2 saturation was 97%.  He reported level 4 of dyspnea on Chris scale.  Patient was able to walk for 6 minutes.    MLWHF score  41    At 6 minute walk test re-evaluation (2022), patient was able to complete 390 m during 6 minute walk test.  O2 saturation at baseline was 97% and at the end of the test, the O2 saturation was 96%. patient reported level 0 of dyspnea on Chris scale.  Patient was able to walk for 6 minutes.  MLWHF 2    Additonally, patient has the following medical problems:    -Diabetes    -Hypertension    -His father  of a heart attack at age 55    Past Medical History:   Diagnosis Date   • CHF (congestive heart failure) (HCC)    • Diabetes (HCC)    • Hyperlipidemia    • Hypertension    • Pulmonary edema      History reviewed. No pertinent surgical history.  Family History   Problem Relation Age of Onset   • Heart Disease Father          of MI at 55   • Heart Attack Father    • Lung Cancer Father    • Diabetes Mother    • Cancer Mother         unknown source, metastasize   • Multiple Sclerosis Sister      Social History     Socioeconomic History   • Marital status: Single     Spouse name: Not on file   • Number of children: Not on file   • Years of education: Not on file   • Highest education level: Not on file   Occupational History   • Not on file   Tobacco Use   • Smoking status: Former Smoker     Packs/day: 1.00     Years: 30.00     Pack years: 30.00     Types: Cigarettes     Quit date:      Years since quittin.5   • Smokeless tobacco: Never Used   Vaping Use   • Vaping Use: Never used   Substance and Sexual Activity   • Alcohol use: Yes     Comment: 2-5 beers daily   • Drug use: Not Currently     Types: Cocaine, Methamphetamines     Comment: Quit Cocaine , last Meth use 3/28/2022   • Sexual activity: Not on file   Other Topics Concern   • Not on file   Social History Narrative   • Not on file     Social Determinants of Health     Financial Resource Strain: Not on file   Food Insecurity: Not on file   Transportation Needs: Not on file   Physical Activity: Not on file   Stress: Not on file   Social  Connections: Not on file   Intimate Partner Violence: Not on file   Housing Stability: Not on file     No Known Allergies  Outpatient Encounter Medications as of 7/12/2022   Medication Sig Dispense Refill   • furosemide (LASIX) 20 MG Tab Take 1 Tablet by mouth 1 time a day as needed (as needed for weight gain greater than 3 pounds in 1 day or 5 pounds in 1 week.). 30 Tablet 5   • clopidogrel (PLAVIX) 75 MG Tab Take 1 Tablet by mouth every day. 90 Tablet 3   • spironolactone (ALDACTONE) 25 MG Tab Take 1 Tablet by mouth every day. 90 Tablet 3   • Cyanocobalamin (VITAMIN B 12 PO) Take 1 Tablet by mouth every day.     • sacubitril-valsartan (ENTRESTO)  MG Tab tablet Take 1 Tablet by mouth 2 times a day. 180 Tablet 3   • Semaglutide,0.25 or 0.5MG/DOS, 2 MG/1.5ML Solution Pen-injector      • Multiple Vitamin (MULTIVITAMIN ADULT PO) Take  by mouth.     • atorvastatin (LIPITOR) 80 MG tablet Take 1 Tablet by mouth every evening. 30 Tablet 0   • Empagliflozin 25 MG Tab Take 25 mg by mouth every day.     • ferrous gluconate (FERGON) 324 (38 Fe) MG Tab Take 324 mg by mouth every morning with breakfast.     • traZODone (DESYREL) 100 MG Tab Take 100 mg by mouth every evening.     • metformin (GLUCOPHAGE) 1000 MG tablet Take 1,000 mg by mouth 2 times a day with meals.     • [DISCONTINUED] carvedilol (COREG) 25 MG Tab Take 25 mg by mouth 2 times a day with meals.     • [DISCONTINUED] furosemide (LASIX) 20 MG Tab Take 1 Tablet by mouth every day. Hold if SBP <105 30 Tablet 0     No facility-administered encounter medications on file as of 7/12/2022.     Review of Systems   Constitutional: Negative for fever, malaise/fatigue and weight loss.   Eyes: Negative for blurred vision.   Respiratory: Negative for cough and shortness of breath.    Cardiovascular: Negative for chest pain, palpitations, orthopnea, claudication, leg swelling and PND.   Gastrointestinal: Negative for abdominal pain, blood in stool, nausea and vomiting.  "  Genitourinary: Negative for dysuria, frequency and hematuria.   Musculoskeletal: Negative for falls and myalgias.   Neurological: Negative for dizziness, tingling and loss of consciousness.   Endo/Heme/Allergies: Does not bruise/bleed easily.              Objective     /60 (BP Location: Left arm, Patient Position: Sitting, BP Cuff Size: Adult)   Pulse 72   Resp 15   Ht 1.676 m (5' 6\")   Wt 75.8 kg (167 lb)   SpO2 97%   BMI 26.95 kg/m²     Physical Exam  Vitals reviewed.   Constitutional:       Appearance: He is well-developed.   HENT:      Head: Normocephalic and atraumatic.   Eyes:      Pupils: Pupils are equal, round, and reactive to light.   Neck:      Vascular: No JVD.   Cardiovascular:      Rate and Rhythm: Normal rate and regular rhythm.      Heart sounds: Normal heart sounds. No murmur heard.    No friction rub. No gallop.   Pulmonary:      Effort: Pulmonary effort is normal. No respiratory distress.      Breath sounds: Normal breath sounds.   Abdominal:      General: Bowel sounds are normal. There is no distension.      Palpations: Abdomen is soft.   Skin:     General: Skin is warm and dry.      Findings: No erythema.   Neurological:      Mental Status: He is alert and oriented to person, place, and time.   Psychiatric:         Behavior: Behavior normal.       Lab Results   Component Value Date/Time    CHOLSTRLTOT 167 06/21/2022 01:49 AM    LDL 80 06/21/2022 01:49 AM    HDL 46 06/21/2022 01:49 AM    TRIGLYCERIDE 205 (H) 06/21/2022 01:49 AM       Lab Results   Component Value Date/Time    SODIUM 137 06/23/2022 03:23 AM    POTASSIUM 3.6 06/23/2022 03:23 AM    CHLORIDE 99 06/23/2022 03:23 AM    CO2 25 06/23/2022 03:23 AM    GLUCOSE 147 (H) 06/23/2022 03:23 AM    BUN 51 (H) 06/23/2022 03:23 AM    CREATININE 1.52 (H) 06/23/2022 03:23 AM     Lab Results   Component Value Date/Time    ALKPHOSPHAT 91 06/19/2022 10:45 PM    ASTSGOT 56 (H) 06/19/2022 10:45 PM    ALTSGPT 34 06/19/2022 10:45 PM    " TBILIRUBIN 0.6 06/19/2022 10:45 PM      Transthoracic Echo Report 3/30/2022   Normal left ventricular chamber size.  Moderately reduced left ventricular systolic function.  The left ventricular ejection fraction is visually estimated to be 30-  35%.  Global hypokinesis with severe hypokinesis proximal inferrior posterior   wall, .  Probably mildly dilated right venricular with reduced right ventricular   systolic function and hypokinetic right ventricular free wall.  No valvular abnormalities.   No prior study is available for comparison.     Coronary angiogram 4/1/2022  HEMODYNAMICS:   1. Aortic pressure: 89/57 mmHg  2. Pre A-wave pressure: 20 mmHg  3. No significant aortic gradient on pullback     CORONARY ANGIOGRAPHY:  1. The left main coronary artery is a short, patent vessel that bifurcates into the left anterior descending and left circumflex coronary arteries.  2. The left anterior descending coronary artery is a large, transapical vessel with proximal 20% disease, a long section of mid 30% disease, focal mid-distal 40-50% disease, and distal luminal irregularities.  It supplies a moderate first diagonal branch with mild disease.  3. The left circumflex coronary artery is a large, nondominant vessel with proximal 20% disease followed by mid luminal irregularities.  The ongoing AV groove vessel is small after the bifurcation of a large, occluded second obtuse marginal branch and supplies a small distal third obtuse marginal branch.  The first obtuse marginal branch is a moderate to large vessel with ostial 50% disease followed by luminal irregularities.  The second obtuse marginal branch is a large vessel that is chronically occluded at the ostium and refills by left to left collaterals.  4. The right coronary artery is a moderate, dominant vessel with proximal 50% disease followed by a mid chronic total occlusion.  The distal vessel refills by left to right collaterals and appears to have a large posterior  descending coronary artery with luminal irregularities and a small posterolateral branch.     IMPRESSION:  1. Severe obstructive two-vessel coronary artery disease with chronic total occlusions of the mid right coronary artery and the ostial second obtuse marginal branch.  2. Mildly elevated left heart filling pressures.     RECOMMENDATIONS:  1. Return to floor with continued care per primary service.  2. TR band release per protocol.  3. Evaluation by cardiothoracic surgery for coronary artery bypass grafting and assessment of likelihood that a LIMA-LAD graft will remain patent.  4. Continue optimal medical management of ischemic cardiomyopathy and severe coronary artery disease.    Transthoracic echocardiogram (6/21/2022):  Moderately reduced left ventricular systolic function.  The left ventricular ejection fraction is visually estimated to be 40-  45%.  Hypokinesis posterior wall.  The right ventricle is normal in size and systolic function.  No valvular abnormalities.   Compared to the images of the prior study 03/30/2022, there has been   improvement of left ventricular function (LVEF from 30-35% to 40-45%),   right ventricular function and inferior wall motion with continued   hypokineis of the posterior wall.    Cardiac MRI (6/30/2022):  1.  Subendocardial enhancement involving the inferior and inferolateral myocardium involving slightly less than 50% of the thickness of the thinned myocardial wall extending from the base to the apical segments. Subendocardial enhancement of the lateral   wall in the cardiac apex, less than 50% of the wall thickness.     2.  Mildly diminished ejection fraction with hypokinesis of the inferior and inferolateral myocardium. Calculated ejection fraction of 51%.         Assessment & Plan     1. Mixed ischemic and toxic cardiomyopathy  furosemide (LASIX) 20 MG Tab    EC-ECHOCARDIOGRAM COMPLETE W/O CONT   2. History of methamphetamine abuse with intermittent relapse   EC-ECHOCARDIOGRAM COMPLETE W/O CONT   3. Coronary artery disease due to calcified coronary lesion  EC-ECHOCARDIOGRAM COMPLETE W/O CONT   4. Mixed hyperlipidemia  EC-ECHOCARDIOGRAM COMPLETE W/O CONT   5. Primary hypertension  EC-ECHOCARDIOGRAM COMPLETE W/O CONT   6. Type 2 diabetes mellitus with other specified complication, without long-term current use of insulin (HCC)  EC-ECHOCARDIOGRAM COMPLETE W/O CONT   7. NSTEMI (non-ST elevated myocardial infarction) (HCC)  EC-ECHOCARDIOGRAM COMPLETE W/O CONT   8. Uncomplicated alcohol dependence ([facility])     9. Other forms of dyspnea         Medical Decision Making: Today's Assessment/Status/Plan:         HFmrEF, Stage C, Class 2, LVEF 45% (improved from 35%): Based on physical examination findings, patient is euvolemic. No JVD, lungs are clear to auscultation, no pitting edema in bilateral lower extremities, no ascites.  -Heart failure due to ischemic versus substance use cardiomyopathy  -ACE-I/ARB/ARNI: Continue Entresto 97/103 twice daily  -Evidence Based Beta-blocker: Resume carvedilol 25 mg twice a day.  Discussed importance of medication adherence.  -Aldosterone Antagonist: Continue spironolactone 25 mg daily  -Diuretic: Furosemide 20 mg as needed for weight gain  -SGLT2 inhibitor: Continue empagliflozin 25 mg daily  -Labs: Reviewed per above  -EF greater than 45%, not a candidate for ICD at this time.  -Discussed importance of complete toxin cessation  -Reinforced s/sx of worsening heart failure with patient and weight monitoring. Pt verbalizes understanding. Pt to call office or RTC if present.    -PUMP line number [phone] (PUMP) reviewed with patient  -Heart Failure Education: pt to be contacted by HF nurse for further education  -Advanced care planning: Consider discussing at future appointments    CAD,  of RCA,  of OM, 50% stenosis of mid LAD:  -Not a CABG candidate per CTS  -Followed by Dr. Arevalo.  Cardiac MRI shows viability.  Scheduled to see Dr. Arevalo on  8/1.  -Continue clopidogrel 75 mg daily  -Continue atorvastatin 80 mg daily  -Patient on SGLT2 inhibitor for CV risk reduction  -Continue smoking cessation, patient reports he has not smoked since 2010    Meth use:  -Patient continues to abstain from use  -Recently hospitalized with ADHF after cocaine relapse    Alcohol use:  -Encourage patient to abstain from alcohol use  -Currently reports 1-3 beers daily    Hypertension  -Well-controlled.  Concern from PCP and hospitalist of potentially being overcontrolled  -Asymptomatic  -Continue medications as prescribed above as tolerated  -Discussed patient to notify office if experiencing dizziness/fatigue with SBP less than 90    Hyperlipidemia:  -Last LDL 64 on 3/31/2022  -Continue atorvastatin 80 mg daily    Diabetes:  -Uncontrolled, last A1c 9.3 on 3/31/2022  -[facility]    FU in clinic as scheduled in 2 weeks with Dr. Arevalo. Sooner if needed.    Patient verbalizes understanding and agrees with the plan of care.     I personally spent a total of 39 minutes which includes face-to-face time and non-face-to-face time spent on preparing to see the patient, reviewing prior notes and tests, obtaining history from the patient, performing a medically appropriate exam, counseling and educating the patient, ordering medications/tests/procedures/referrals as clinically indicated, and documenting information in the electronic medical record.    PARRIS Adams.   [facility]  [phone]    PLEASE NOTE: This Note was created using voice recognition Software. I have made every reasonable attempt to correct obvious errors, but I expect that there are errors of grammar and possibly content that I did not discover before finalizing the note

## 2022-07-12 ENCOUNTER — OFFICE VISIT (OUTPATIENT)
Dept: CARDIOLOGY | Facility: MEDICAL CENTER | Age: 67
End: 2022-07-12
Payer: COMMERCIAL

## 2022-07-12 VITALS
RESPIRATION RATE: 15 BRPM | HEIGHT: 66 IN | OXYGEN SATURATION: 97 % | DIASTOLIC BLOOD PRESSURE: 60 MMHG | HEART RATE: 72 BPM | BODY MASS INDEX: 26.84 KG/M2 | WEIGHT: 167 LBS | SYSTOLIC BLOOD PRESSURE: 106 MMHG

## 2022-07-12 DIAGNOSIS — F10.20 UNCOMPLICATED ALCOHOL DEPENDENCE (HCC): ICD-10-CM

## 2022-07-12 DIAGNOSIS — I25.84 CORONARY ARTERY DISEASE DUE TO CALCIFIED CORONARY LESION: ICD-10-CM

## 2022-07-12 DIAGNOSIS — I50.20 HFREF (HEART FAILURE WITH REDUCED EJECTION FRACTION) (HCC): ICD-10-CM

## 2022-07-12 DIAGNOSIS — R06.09 OTHER FORMS OF DYSPNEA: ICD-10-CM

## 2022-07-12 DIAGNOSIS — I25.10 CORONARY ARTERY DISEASE DUE TO CALCIFIED CORONARY LESION: ICD-10-CM

## 2022-07-12 DIAGNOSIS — F15.10 METHAMPHETAMINE ABUSE (HCC): ICD-10-CM

## 2022-07-12 DIAGNOSIS — I21.4 NSTEMI (NON-ST ELEVATED MYOCARDIAL INFARCTION) (HCC): ICD-10-CM

## 2022-07-12 DIAGNOSIS — E11.69 TYPE 2 DIABETES MELLITUS WITH OTHER SPECIFIED COMPLICATION, WITHOUT LONG-TERM CURRENT USE OF INSULIN (HCC): ICD-10-CM

## 2022-07-12 DIAGNOSIS — I10 PRIMARY HYPERTENSION: ICD-10-CM

## 2022-07-12 DIAGNOSIS — E78.2 MIXED HYPERLIPIDEMIA: ICD-10-CM

## 2022-07-12 PROCEDURE — 99214 OFFICE O/P EST MOD 30 MIN: CPT | Performed by: NURSE PRACTITIONER

## 2022-07-12 RX ORDER — CARVEDILOL 25 MG/1
25 TABLET ORAL 2 TIMES DAILY WITH MEALS
COMMUNITY
End: 2022-07-12

## 2022-07-12 RX ORDER — FUROSEMIDE 20 MG/1
20 TABLET ORAL
Qty: 30 TABLET | Refills: 5 | Status: SHIPPED | OUTPATIENT
Start: 2022-07-12

## 2022-07-12 ASSESSMENT — ENCOUNTER SYMPTOMS
FALLS: 0
BRUISES/BLEEDS EASILY: 0
WEIGHT LOSS: 0
TINGLING: 0
VOMITING: 0
BLURRED VISION: 0
BLOOD IN STOOL: 0
LOSS OF CONSCIOUSNESS: 0
NAUSEA: 0

## 2022-07-12 ASSESSMENT — MINNESOTA LIVING WITH HEART FAILURE QUESTIONNAIRE (MLHF)
DIFFICULTY SLEEPING WELL AT NIGHT: 0
TIRED, FATIGUED OR LOW ON ENERGY: 0
DIFFICULTY GOING AWAY FROM HOME: 0
DIFFICULTY WITH SEXUAL ACTIVITIES: 0
SWELLING IN ANKLES OR LEGS: 0
WORKING AROUND THE HOUSE OR YARD DIFFICULT: 0
MAKING YOU SHORT OF BREATH: 0
DIFFICULTY WORKING TO EARN A LIVING: 0
WALKING ABOUT OR CLIMBING STAIRS DIFFICULT: 0
TOTAL_SCORE: 8
EATING LESS FOODS YOU LIKE: 0
GIVING YOU SIDE EFFECTS FROM TREATMENTS: 0
DIFFICULTY SOCIALIZING WITH FAMILY OR FRIENDS: 0
FEELING LIKE A BURDEN TO FAMILY AND FRIENDS: 0
COSTING YOU MONEY FOR MEDICAL CARE: 3
MAKING YOU STAY IN A HOSPITAL: 3
LOSS OF SELF CONTROL IN YOUR LIFE: 0
MAKING YOU WORRY: 0
HAVING TO SIT OR LIE DOWN DURING THE DAY: 0
MAKING YOU FEEL DEPRESSED: 0
DIFFICULTY TO CONCENTRATE OR REMEMBERING THINGS: 0
DIFFICULTY WITH RECREATIONAL PASTIMES, SPORTS, HOBBIES: 2

## 2022-07-12 ASSESSMENT — 6 MINUTE WALK TEST (6MWT): TOTAL DISTANCE WALKED (METERS): 390.14

## 2022-07-12 ASSESSMENT — FIBROSIS 4 INDEX: FIB4 SCORE: 1.94

## 2022-07-12 NOTE — PATIENT INSTRUCTIONS
Furosemide 20 mg daily as needed for weight gain greater than 3 pounds in 1 day or 5 pounds in 1 week.

## 2022-07-12 NOTE — Clinical Note
WILLIAMS, Continues to improve functionally Stopped his coreg for 2 weeks. Resuming today. Let us know if you want HF clinic to keep following, he seems well optimized.

## 2022-07-19 ENCOUNTER — TELEPHONE (OUTPATIENT)
Dept: CARDIOLOGY | Facility: MEDICAL CENTER | Age: 67
End: 2022-07-19
Payer: COMMERCIAL

## 2022-07-19 ENCOUNTER — PATIENT MESSAGE (OUTPATIENT)
Dept: CARDIOLOGY | Facility: MEDICAL CENTER | Age: 67
End: 2022-07-19
Payer: COMMERCIAL

## 2022-07-20 RX ORDER — CARVEDILOL 25 MG/1
25 TABLET ORAL 2 TIMES DAILY WITH MEALS
COMMUNITY
End: 2022-10-25 | Stop reason: SDUPTHER

## 2022-07-20 NOTE — PATIENT COMMUNICATION
Replied to pt via [employer] requesting more information regarding concerns, awaiting pt response.

## 2022-07-20 NOTE — TELEPHONE ENCOUNTER
----- Message from Vinh Arevalo M.D. sent at 7/18/2022  3:03 PM PDT -----  Elvira, can you see if he is doing well with carvedilol 25 mg twice daily.  If he is back on his full dose and is otherwise tolerating Entresto, spironolactone, and Jardiance then I can continue to see him in clinic and he does not need to follow-up with heart failure.      ----- Message -----  From: ALY Adams  Sent: 7/12/2022   3:20 PM PDT  To: Vinh Arevalo M.D.    FYI,  Continues to improve functionally  Stopped his coreg for 2 weeks. Resuming today. Let us know if you want HF clinic to keep following, he seems well optimized.

## 2022-07-29 NOTE — PROGRESS NOTES
CARDIOLOGY CLINIC NOTE      Date of Visit: 7/29/2022    [facility]    Patient Name: Tucker Frederick    YOB: 1955  MRN: [id number]     Reason for Visit:   Follow-up     Patient Story:   Tucker Frederick is a 66 year-old gentleman with a past medical history of heart failure with reduced ejection fraction, severe coronary artery disease ( of RCA and OM2), type 2 diabetes, hypertension, dyslipidemia, prior methamphetamine abuse, and alcohol abuse. Briefly, he was recently admitted to [facility] with worsening shortness of breath and abdominal distention.  He was found to have newly diagnosed left ventricular systolic dysfunction as well as a significantly elevated NT-proBNP and troponin.  He underwent coronary angiography to further evaluate his abnormalities, which demonstrated chronic occlusions of the mid RCA and ostial OM2.  He was evaluated by CT Surgery and was not felt to be a surgical candidate.  After diuresis and initiation of a neurohormonal regimen, he was discharged with Cardiology follow-up.    At his initial clinic visit, he reported feeling much better with no concerning weight gain.  He denied recurrent methamphetamine use and reported that he had reduced his alcoholic drinks daily to about 3-5.  His Lasix dose was reduced and his spironolactone and carvedilol doses were increased.  He was also transitioned to Entresto with stable follow-up laboratories.    At his last clinic visit with me, his carvedilol was increased to 25 mg bid and he was stopped on furosemide.  He initially did well, but [facility] and [facility] after a met relapse. His NHB was signficantly reduced at that time, but was subsequnelty restarted in HF clinic.      Interval History:   Today he reports feeling well and states that he is tolerating his medications without symptomatic hypotension.  He also reports that he has been free of meth and now understands what  can happen if he relapses.  He has only been taking Lasix as needed if he gains a few pounds or develops abdominal distention.  He notes that he has not had significant shortness of breath or chest pain with exertion.     Medications and Allergies:     Current Outpatient Medications   Medication Sig Dispense Refill   • carvedilol (COREG) 25 MG Tab Take 25 mg by mouth 2 times a day with meals.     • spironolactone (ALDACTONE) 25 MG Tab Take 1 Tablet by mouth every day. 90 Tablet 3   • furosemide (LASIX) 20 MG Tab Take 1 Tablet by mouth 1 time a day as needed (as needed for weight gain greater than 3 pounds in 1 day or 5 pounds in 1 week.). 30 Tablet 5   • clopidogrel (PLAVIX) 75 MG Tab Take 1 Tablet by mouth every day. 90 Tablet 3   • Cyanocobalamin (VITAMIN B 12 PO) Take 1 Tablet by mouth every day.     • sacubitril-valsartan (ENTRESTO)  MG Tab tablet Take 1 Tablet by mouth 2 times a day. 180 Tablet 3   • Semaglutide,0.25 or 0.5MG/DOS, 2 MG/1.5ML Solution Pen-injector      • Multiple Vitamin (MULTIVITAMIN ADULT PO) Take  by mouth.     • atorvastatin (LIPITOR) 80 MG tablet Take 1 Tablet by mouth every evening. 30 Tablet 0   • Empagliflozin 25 MG Tab Take 25 mg by mouth every day.     • ferrous gluconate (FERGON) 324 (38 Fe) MG Tab Take 324 mg by mouth every morning with breakfast.     • traZODone (DESYREL) 100 MG Tab Take 100 mg by mouth every evening.     • metformin (GLUCOPHAGE) 1000 MG tablet Take 1,000 mg by mouth 2 times a day with meals.       No current facility-administered medications for this visit.     No Known Allergies     Medical Decision Making:   # Heart failure with reduced ejection fraction: This is likely multifactorial from ischemic disease as well as polysubstance abuse.  Today, he appears well compensated and euvolemic.  His cardiac MRI demonstrated only very mildly reduced left ventricular systolic function with a calculated ejection fraction of 51%.  -Continue Entresto  twice  daily  -Continue spironolactone to 25 mg daily  -Continue carvedilol to 25 mg twice daily  -Okay to continue with as needed furosemide for weight gain or abdominal distention    # Severe coronary artery disease ( mid RCA,  ostial OM2): His LDL is well controlled on moderate dose atorvastatin.  His cardiac MRI did demonstrate viability.  We had an extensive discussion regarding the indications for  intervention, which is an invasive procedure that carries a risk of serious complications.  Currently, as he is essentially asymptomatic and has had reasonable recovery of his left ventricular systolic function, he wished to proceed with initial medical management following this discussion.  -Continue atorvastatin 40 mg nightly  -Continue clopidogrel 75 mg daily  -Repeat echocardiogram in 5-6 months, reconsider  intervention if this demonstrates worsening systolic function    # Hypertension: Well-controlled today, see above for medical management.    # Mixed hyperlipidemia: See above for medical management.    # Type 2 diabetes: Defer glucose control to PCP.  He has already been started on an SGL-2 inhibitor and a GLP-1 agonist.    # History of polysubstance abuse: Unfortunately he had a meth relapse, however, today he reports being free and having a better understanding of what can happen to his heart with relapses.    Follow Up  5-6 months with repeat echocardiogram     Cardiac Studies and Procedures:   Echocardiography  TTE (3/30/2021)  Normal left ventricular chamber size.  Moderately reduced left ventricular systolic function.  The left ventricular ejection fraction is visually estimated to be 30-35%.  Global hypokinesis with severe hypokinesis basal inferolateral wall.  Probably mildly dilated right venricular with reduced right ventricular systolic function and hypokinetic right ventricular free wall.  No valvular abnormalities.     CT/MRI  Cardiac MRI (6/30/2022)  1.  Subendocardial enhancement  involving the inferior and inferolateral myocardium involving slightly less than 50% of the thickness of the thinned myocardial wall extending from the base to the apical segments. Subendocardial enhancement of the lateral   wall in the cardiac apex, less than 50% of the wall thickness.  2.  Mildly diminished ejection fraction with hypokinesis of the inferior and inferolateral myocardium. Calculated ejection fraction of 51%.    Coronary Angiography/Cardiac Catheterization  CAG (4/1/2022)  LM: Patent  LAD: Proximal 20%, mid 30%, mid-distal 50%  LCx: Proximal 20%, ostial OM1 50%, ostial  OM2  RCA: Proximal 50%, mid      Vital Signs:   There were no vitals taken for this visit.   BP Readings from Last 4 Encounters:   07/12/22 106/60   06/23/22 (!) 87/57   06/01/22 124/64   04/29/22 124/70     Wt Readings from Last 4 Encounters:   07/12/22 75.8 kg (167 lb)   06/23/22 74.7 kg (164 lb 10.9 oz)   06/01/22 79.4 kg (175 lb)   04/29/22 80.7 kg (178 lb)     There is no height or weight on file to calculate BMI.     Laboratories:   [facility] (5/8/2022)  Glucose 169  BUN 16  Cr 1.1  Na 138  K 4.2     [facility] (4/15/2022)  Glucose 214  BUN 26  Cr 1.2  Na 133  K 4.4  GFR 67  HDL 37  LDL 57    Total 118    Lipids  Lab Results   Component Value Date/Time    LDL 64 03/31/2022 0530     (H) 03/30/2022 0545     Lab Results   Component Value Date/Time    HDL 59 03/31/2022 0530    HDL 71 03/30/2022 0545       Lab Results   Component Value Date/Time    TRIGLYCERIDE 178 (H) 03/31/2022 0530    TRIGLYCERIDE 177 (H) 03/30/2022 0545       Lab Results   Component Value Date/Time    CHOLSTRLTOT 159 03/31/2022 0530    CHOLSTRLTOT 207 (H) 03/30/2022 0545     Chemistries  Lab Results   Component Value Date/Time    CREATININE 0.88 04/02/2022 0221    CREATININE 0.80 04/01/2022 0059    CREATININE 0.97 03/31/2022 0530    CREATININE 0.86 03/30/2022 2200    CREATININE 0.95 03/30/2022 1032     Lab Results   Component Value Date/Time    BUN  26 (H) 04/02/2022 0221    BUN 27 (H) 04/01/2022 0059    BUN 27 (H) 03/31/2022 0530    BUN 23 (H) 03/30/2022 2200    BUN 24 (H) 03/30/2022 1032     Lab Results   Component Value Date/Time    POTASSIUM 3.8 04/02/2022 0221    POTASSIUM 3.6 04/01/2022 0059    POTASSIUM 3.8 03/31/2022 0530     Lab Results   Component Value Date/Time    SODIUM 141 04/02/2022 0221    SODIUM 141 04/01/2022 0059    SODIUM 141 03/31/2022 0530     Lab Results   Component Value Date/Time    GLUCOSE 100 (H) 04/02/2022 0221    GLUCOSE 96 04/01/2022 0059    GLUCOSE 153 (H) 03/31/2022 0530     Lab Results   Component Value Date/Time    ASTSGOT 84 (H) 03/30/2022 0545    ASTSGOT 82 (H) 03/29/2022 2000     Lab Results   Component Value Date/Time    ALTSGPT 74 (H) 03/30/2022 0545    ALTSGPT 80 (H) 03/29/2022 2000     Lab Results   Component Value Date/Time    ALKPHOSPHAT 87 03/30/2022 0545    ALKPHOSPHAT 94 03/29/2022 2000     Lab Results   Component Value Date/Time    HBA1C 9.3 (H) 03/31/2022 0530    HBA1C 9.3 (H) 03/30/2022 0545     Lab Results   Component Value Date/Time    NTPROBNP 1335 (H) 04/03/2022 0429    NTPROBNP 1466 (H) 04/02/2022 0221    NTPROBNP 2090 (H) 04/01/2022 0059     Lab Results   Component Value Date/Time    TROPONINT 943 (H) 03/30/2022 0235    TROPONINT 887 (H) 03/29/2022 2229    TROPONINT 768 (H) 03/29/2022 2000     Blood Counts  Lab Results   Component Value Date/Time    HEMOGLOBIN 12.7 (L) 04/03/2022 0429    HEMOGLOBIN 11.7 (L) 04/02/2022 0221    HEMOGLOBIN 12.3 (L) 04/01/2022 0059     Lab Results   Component Value Date/Time    PLATELETCT 285 04/03/2022 0429    PLATELETCT 224 04/02/2022 0221    PLATELETCT 229 04/01/2022 0059     Lab Results   Component Value Date/Time    WBC 5.8 04/03/2022 0429    WBC 5.4 04/02/2022 0221    WBC 8.2 04/01/2022 0059      Physical Examination:   General: Well-appearing, no acute distress  Eyes: Extraocular movements intact, anicteric  Neck: Supple, full range of motion, no jugular venous  distension  Pulmonary: Normal respiratory effort, no distress  Cardiovascular: Regular rate and rhythm  Gastrointestinal: Slightly distended  Extremities: Warm and well perfused, no lower extremity edema  Neurological: Alert and oriented, no gross focal motor deficits     Past History:   Past Medical History  The patient's past medical history was reviewed.  See HPI and self-reported patient medical history form for pertinent medical history to consultation.    Past Social History  The patient's social history was reviewed.  See \[facility]\"" self-reported patient medical history form for pertinent social history to consultation.    Past Family History  The patient's family history was reviewed.  See HPI self-reported patient medical history form for pertinent family history to consultation.    Review of Systems  A pertinent cardiac review of systems was performed and was otherwise unremarkable except as per HPI and self-reported patient medical history form.        Vinh Arevalo MD, [facility]  Interventional Cardiology  [facility], [address]  [address] [address]  Phone: [phone]  Fax: [phone]

## 2022-08-01 ENCOUNTER — OFFICE VISIT (OUTPATIENT)
Dept: CARDIOLOGY | Facility: MEDICAL CENTER | Age: 67
End: 2022-08-01
Payer: COMMERCIAL

## 2022-08-01 VITALS
RESPIRATION RATE: 12 BRPM | OXYGEN SATURATION: 94 % | BODY MASS INDEX: 27.32 KG/M2 | HEIGHT: 66 IN | HEART RATE: 85 BPM | WEIGHT: 170 LBS | DIASTOLIC BLOOD PRESSURE: 62 MMHG | SYSTOLIC BLOOD PRESSURE: 110 MMHG

## 2022-08-01 DIAGNOSIS — I50.20 HFREF (HEART FAILURE WITH REDUCED EJECTION FRACTION) (HCC): ICD-10-CM

## 2022-08-01 DIAGNOSIS — I10 PRIMARY HYPERTENSION: ICD-10-CM

## 2022-08-01 DIAGNOSIS — F15.10 METHAMPHETAMINE ABUSE (HCC): ICD-10-CM

## 2022-08-01 DIAGNOSIS — I25.10 CORONARY ARTERY DISEASE DUE TO CALCIFIED CORONARY LESION: ICD-10-CM

## 2022-08-01 DIAGNOSIS — I25.84 CORONARY ARTERY DISEASE DUE TO CALCIFIED CORONARY LESION: ICD-10-CM

## 2022-08-01 DIAGNOSIS — E78.2 MIXED HYPERLIPIDEMIA: ICD-10-CM

## 2022-08-01 PROBLEM — I21.4 NSTEMI (NON-ST ELEVATED MYOCARDIAL INFARCTION) (HCC): Status: RESOLVED | Noted: 2022-06-20 | Resolved: 2022-08-01

## 2022-08-01 PROCEDURE — 99215 OFFICE O/P EST HI 40 MIN: CPT | Performed by: INTERNAL MEDICINE

## 2022-08-01 RX ORDER — SEMAGLUTIDE 1.34 MG/ML
INJECTION, SOLUTION SUBCUTANEOUS
COMMUNITY
Start: 2022-05-02

## 2022-08-01 ASSESSMENT — FIBROSIS 4 INDEX: FIB4 SCORE: 1.94

## 2022-08-08 ENCOUNTER — PATIENT MESSAGE (OUTPATIENT)
Dept: CARDIOLOGY | Facility: MEDICAL CENTER | Age: 67
End: 2022-08-08
Payer: COMMERCIAL

## 2022-10-22 ENCOUNTER — PATIENT MESSAGE (OUTPATIENT)
Dept: CARDIOLOGY | Facility: MEDICAL CENTER | Age: 67
End: 2022-10-22
Payer: COMMERCIAL

## 2022-10-22 DIAGNOSIS — I10 PRIMARY HYPERTENSION: ICD-10-CM

## 2022-10-22 DIAGNOSIS — I50.20 HFREF (HEART FAILURE WITH REDUCED EJECTION FRACTION) (HCC): ICD-10-CM

## 2022-10-25 RX ORDER — CARVEDILOL 25 MG/1
25 TABLET ORAL 2 TIMES DAILY WITH MEALS
Qty: 180 TABLET | Refills: 3 | Status: SHIPPED | OUTPATIENT
Start: 2022-10-25 | End: 2023-10-11 | Stop reason: SDUPTHER

## 2022-11-08 ENCOUNTER — PATIENT MESSAGE (OUTPATIENT)
Dept: HEALTH INFORMATION MANAGEMENT | Facility: OTHER | Age: 67
End: 2022-11-08

## 2022-11-08 ENCOUNTER — TELEPHONE (OUTPATIENT)
Dept: CARDIOLOGY | Facility: MEDICAL CENTER | Age: 67
End: 2022-11-08
Payer: COMMERCIAL

## 2022-11-08 NOTE — TELEPHONE ENCOUNTER
BN          Caller: Papi from [facility]    Topic/issue: Their office was calling for a continuity of care sent from our office to them with additional supporting clinical documentation      Callback Number: [phone]  Fax# [phone]      Thank you    -Taco SOMERS

## 2022-11-16 NOTE — TELEPHONE ENCOUNTER
2nd attempt to call [facility] Office, [phone], and s/w Ella who states they are requesting RFS (request for service) new consult for continuation of care.   given to Ella who states she will fax blank form for completion.    awaiting for fax to be received

## 2022-11-16 NOTE — TELEPHONE ENCOUNTER
Called [facility] Office [phone], unable to reach live representative as office is open [phone].      Will attempt to call at a later time to clarify what they are requesting for.

## 2023-02-17 ENCOUNTER — HOSPITAL ENCOUNTER (OUTPATIENT)
Dept: CARDIOLOGY | Facility: MEDICAL CENTER | Age: 68
End: 2023-02-17
Attending: INTERNAL MEDICINE
Payer: COMMERCIAL

## 2023-02-17 DIAGNOSIS — I10 PRIMARY HYPERTENSION: ICD-10-CM

## 2023-02-17 DIAGNOSIS — E11.69 TYPE 2 DIABETES MELLITUS WITH OTHER SPECIFIED COMPLICATION, WITHOUT LONG-TERM CURRENT USE OF INSULIN (HCC): ICD-10-CM

## 2023-02-17 DIAGNOSIS — I25.84 CORONARY ARTERY DISEASE DUE TO CALCIFIED CORONARY LESION: ICD-10-CM

## 2023-02-17 DIAGNOSIS — I50.20 HFREF (HEART FAILURE WITH REDUCED EJECTION FRACTION) (HCC): ICD-10-CM

## 2023-02-17 DIAGNOSIS — I25.10 CORONARY ARTERY DISEASE DUE TO CALCIFIED CORONARY LESION: ICD-10-CM

## 2023-02-17 DIAGNOSIS — F15.10 METHAMPHETAMINE ABUSE (HCC): ICD-10-CM

## 2023-02-17 DIAGNOSIS — E78.2 MIXED HYPERLIPIDEMIA: ICD-10-CM

## 2023-02-17 DIAGNOSIS — I21.4 NSTEMI (NON-ST ELEVATED MYOCARDIAL INFARCTION) (HCC): ICD-10-CM

## 2023-02-17 LAB
LV EJECT FRACT  99904: 60
LV EJECT FRACT MOD 2C 99903: 65.32
LV EJECT FRACT MOD 4C 99902: 38.34
LV EJECT FRACT MOD BP 99901: 44.24

## 2023-02-17 PROCEDURE — 93306 TTE W/DOPPLER COMPLETE: CPT | Mod: 26 | Performed by: INTERNAL MEDICINE

## 2023-02-17 PROCEDURE — 93306 TTE W/DOPPLER COMPLETE: CPT

## 2023-02-27 ENCOUNTER — OFFICE VISIT (OUTPATIENT)
Dept: CARDIOLOGY | Facility: MEDICAL CENTER | Age: 68
End: 2023-02-27
Payer: COMMERCIAL

## 2023-02-27 ENCOUNTER — RESEARCH ENCOUNTER (OUTPATIENT)
Dept: CARDIOLOGY | Facility: MEDICAL CENTER | Age: 68
End: 2023-02-27
Payer: COMMERCIAL

## 2023-02-27 VITALS
HEART RATE: 80 BPM | BODY MASS INDEX: 27.48 KG/M2 | RESPIRATION RATE: 16 BRPM | HEIGHT: 66 IN | DIASTOLIC BLOOD PRESSURE: 52 MMHG | OXYGEN SATURATION: 95 % | SYSTOLIC BLOOD PRESSURE: 88 MMHG | WEIGHT: 171 LBS

## 2023-02-27 DIAGNOSIS — I10 PRIMARY HYPERTENSION: ICD-10-CM

## 2023-02-27 DIAGNOSIS — I50.20 HFREF (HEART FAILURE WITH REDUCED EJECTION FRACTION) (HCC): ICD-10-CM

## 2023-02-27 DIAGNOSIS — Z00.6 RESEARCH STUDY PATIENT: ICD-10-CM

## 2023-02-27 DIAGNOSIS — E78.2 MIXED HYPERLIPIDEMIA: ICD-10-CM

## 2023-02-27 DIAGNOSIS — I25.10 CORONARY ARTERY DISEASE DUE TO CALCIFIED CORONARY LESION: ICD-10-CM

## 2023-02-27 DIAGNOSIS — I25.84 CORONARY ARTERY DISEASE DUE TO CALCIFIED CORONARY LESION: ICD-10-CM

## 2023-02-27 PROCEDURE — 99214 OFFICE O/P EST MOD 30 MIN: CPT | Performed by: INTERNAL MEDICINE

## 2023-02-27 ASSESSMENT — FIBROSIS 4 INDEX: FIB4 SCORE: 1.97

## 2023-02-27 NOTE — RESEARCH NOTE
[employer] [place] [facility] enrollment complete. Saliva sample collected onsite. EMERSON Identified consented and enrolled.

## 2023-02-27 NOTE — PROGRESS NOTES
CARDIOLOGY CLINIC NOTE      Date of Visit: 2/27/2023    Primary Care Provider: OG Fournier    Patient Name: Tucker Frederick    YOB: 1955  MRN: [id number]     Reason for Visit:   Follow-up     Patient Story:   Tucker Frederick is a 66 year-old gentleman with a past medical history of heart failure with reduced ejection fraction, severe coronary artery disease ( of RCA and OM2), type 2 diabetes, hypertension, dyslipidemia, prior methamphetamine abuse, and alcohol abuse. Briefly, he was recently admitted to [facility] with worsening shortness of breath and abdominal distention.  He was found to have newly diagnosed left ventricular systolic dysfunction as well as a significantly elevated NT-proBNP and troponin.  He underwent coronary angiography to further evaluate his abnormalities, which demonstrated chronic occlusions of the mid RCA and ostial OM2.  He was evaluated by CT Surgery and was not felt to be a surgical candidate.  After diuresis and initiation of a neurohormonal regimen, he was discharged with Cardiology follow-up.    At his initial clinic visit, he reported feeling much better with no concerning weight gain.  He denied recurrent methamphetamine use and reported that he had reduced his alcoholic drinks daily to about 3-5.  In follow-up, he was titrated onto a complete neurohormonal blockade regimen at goal doses.  He initially did well, but [facility] after a [facility] relapse. His NHB was signficantly reduced at that time, but was subsequently restarted and uptitrated in follow-up.  He was ultimately stopped on furosemide without recurrent weight gain.    Today, he notes that a few weeks ago he felt a little worse and was having shortness of breath and congestion.  He started taking his furosemide again, but did not notice an immediate change in his symptoms.  With time his symptoms have slowly improved and he states he can now  walk about 2 miles on flat ground without significant shortness of breath, however, if he does more vigorous activity like playing tug of war with his dog, he does get short of breath quickly.     Medications and Allergies:     Current Outpatient Medications   Medication Sig Dispense Refill    diphenhydrAMINE (BENADRYL) 12.5 MG/5ML Liquid liquid Take 12.5 mg by mouth at bedtime. alternates with trazodone      diclofenac sodium (VOLTAREN) 1 % Gel Apply  topically 4 times a day as needed.      carvedilol (COREG) 25 MG Tab Take 1 Tablet by mouth 2 times a day with meals. 180 Tablet 3    Semaglutide,0.25 or 0.5MG/DOS, (OZEMPIC, 0.25 OR 0.5 MG/DOSE,) 2 MG/1.5ML Solution Pen-injector       spironolactone (ALDACTONE) 25 MG Tab Take 1 Tablet by mouth every day. 90 Tablet 3    furosemide (LASIX) 20 MG Tab Take 1 Tablet by mouth 1 time a day as needed (as needed for weight gain greater than 3 pounds in 1 day or 5 pounds in 1 week.). 30 Tablet 5    clopidogrel (PLAVIX) 75 MG Tab Take 1 Tablet by mouth every day. 90 Tablet 3    Cyanocobalamin (VITAMIN B 12 PO) Take 1 Tablet by mouth every day.      sacubitril-valsartan (ENTRESTO)  MG Tab tablet Take 1 Tablet by mouth 2 times a day. 180 Tablet 3    Multiple Vitamin (MULTIVITAMIN ADULT PO) Take  by mouth.      atorvastatin (LIPITOR) 80 MG tablet Take 1 Tablet by mouth every evening. 30 Tablet 0    Empagliflozin 25 MG Tab Take 25 mg by mouth every day.      ferrous gluconate (FERGON) 324 (38 Fe) MG Tab Take 324 mg by mouth every morning with breakfast.      traZODone (DESYREL) 100 MG Tab Take 100 mg by mouth every evening.      metformin (GLUCOPHAGE) 1000 MG tablet Take 1,000 mg by mouth 2 times a day with meals.       No current facility-administered medications for this visit.     No Known Allergies     Medical Decision Making:   # Heart failure with reduced ejection fraction: This is likely multifactorial from ischemic disease as well as polysubstance abuse.  His cardiac  MRI demonstrated only very mildly reduced left ventricular systolic function with a calculated ejection fraction of 51% and his most recent TTE a few months later demonstrated normalization of his left ventricular function.  Today, he continues to appear euvolemic and well compensated.  -Continue Entresto  twice daily  -Continue spironolactone to 25 mg daily  -Continue carvedilol to 25 mg twice daily  -Continue empagliflozin 25 mg daily  -Return to as needed furosemide  -He [facility]    # Severe coronary artery disease ( mid RCA,  ostial OM2): His LDL is well controlled on moderate dose atorvastatin.  His cardiac MRI did demonstrate likely viability in these vascular territories.  Given that he has had persistent shortness of breath, I again discussed that we could consider a  intervention to potentially improve his symptoms and possibly preserve myocardial function.  At this time, he did not feel that his symptoms were concerning enough to proceed with a high risk intervention.  -Continue atorvastatin 40 mg nightly  -Continue clopidogrel 75 mg daily  -Patient instructed to call the office if his symptoms worsened to the point that he would consider an intervention    # Hypertension: Mildly hypotensive today, which may be a result of furosemide.  -Discontinue daily furosemide    # Mixed hyperlipidemia: See above for medical management.    # Type 2 diabetes: Defer glucose control to PCP.  He has already been started on an SGL-2 inhibitor and a GLP-1 agonist.    # History of polysubstance abuse: He has been free of methamphetamines, but does continue to intermittently drink alcohol.    Follow Up  6 months     Cardiac Studies and Procedures:   Echocardiography  TTE (2/17/2023)  Preserved left ventricular systolic function.  The left ventricular ejection fraction is visually estimated to be 55-60%.  There is hypokinesis of the mid to distal anterolateral and  "posterolateral walls.  Grade I diastolic function.  The right ventricle is normal in size and systolic function.  Unable to estimate right ventricular systolic pressure due to an inadequate tricuspid regurgitant jet.  The left atrium is normal in size.  No significant valvular abnormalities.   Normal inferior vena cava size and inspiratory collapse.    TTE (6/21/2022)  Mildly reduced left ventricular systolic function.  The left ventricular ejection fraction is visually estimated to be 40-45%.  Hypokinesis of the posterior wall.  The right ventricle is normal in size and systolic function.  No valvular abnormalities.     TTE (3/30/2021)  Normal left ventricular chamber size.  Moderately reduced left ventricular systolic function.  The left ventricular ejection fraction is visually estimated to be 30-35%.  Global hypokinesis with severe hypokinesis basal inferolateral wall.  Probably mildly dilated right venricular with reduced right ventricular systolic function and hypokinetic right ventricular free wall.  No valvular abnormalities.     CT/MRI  Cardiac MRI (6/30/2022)  1.  Subendocardial enhancement involving the inferior and inferolateral myocardium involving slightly less than 50% of the thickness of the thinned myocardial wall extending from the base to the apical segments. Subendocardial enhancement of the lateral   wall in the cardiac apex, less than 50% of the wall thickness.  2.  Mildly diminished ejection fraction with hypokinesis of the inferior and inferolateral myocardium. Calculated ejection fraction of 51%.    Coronary Angiography/Cardiac Catheterization  CAG (4/1/2022)  LM: Patent  LAD: Proximal 20%, mid 30%, mid-distal 50%  LCx: Proximal 20%, ostial OM1 50%, ostial  OM2  RCA: Proximal 50%, mid      Vital Signs:   BP (!) 88/52 (BP Location: Left arm, Patient Position: Sitting)   Pulse 80   Resp 16   Ht 1.676 m (5' 6\")   Wt 77.6 kg (171 lb)   SpO2 95%    BP Readings from Last 4 Encounters: "   02/27/23 (!) 88/52   08/01/22 110/62   07/12/22 106/60   06/23/22 (!) 87/57     Wt Readings from Last 4 Encounters:   02/27/23 77.6 kg (171 lb)   08/01/22 77.1 kg (170 lb)   07/12/22 75.8 kg (167 lb)   06/23/22 74.7 kg (164 lb 10.9 oz)     Body mass index is 27.6 kg/m².     Laboratories:   [facility] (5/8/2022)  Glucose 169  BUN 16  Cr 1.1  Na 138  K 4.2     [facility] (4/15/2022)  Glucose 214  BUN 26  Cr 1.2  Na 133  K 4.4  GFR 67  HDL 37  LDL 57    Total 118    Lipids  Lab Results   Component Value Date/Time    LDL 80 06/21/2022 01:49 AM    LDL 64 03/31/2022 05:30 AM     (H) 03/30/2022 05:45 AM       No results found for: LDLCALC  Lab Results   Component Value Date/Time    HDL 46 06/21/2022 01:49 AM    HDL 59 03/31/2022 05:30 AM    HDL 71 03/30/2022 05:45 AM       Lab Results   Component Value Date/Time    TRIGLYCERIDE 205 (H) 06/21/2022 01:49 AM    TRIGLYCERIDE 178 (H) 03/31/2022 05:30 AM    TRIGLYCERIDE 177 (H) 03/30/2022 05:45 AM       Lab Results   Component Value Date/Time    CHOLSTRLTOT 167 06/21/2022 01:49 AM    CHOLSTRLTOT 159 03/31/2022 05:30 AM    CHOLSTRLTOT 207 (H) 03/30/2022 05:45 AM       No results found for: LIPOPROTA      Chemistries  Lab Results   Component Value Date/Time    CREATININE 1.52 (H) 06/23/2022 03:23 AM    CREATININE 1.67 (H) 06/22/2022 02:17 AM    CREATININE 1.45 (H) 06/21/2022 01:49 AM    CREATININE 1.63 (H) 06/19/2022 10:45 PM    CREATININE 0.88 04/02/2022 02:21 AM     Lab Results   Component Value Date/Time    BUN 51 (H) 06/23/2022 03:23 AM    BUN 45 (H) 06/22/2022 02:17 AM    BUN 34 (H) 06/21/2022 01:49 AM    BUN 31 (H) 06/19/2022 10:45 PM    BUN 26 (H) 04/02/2022 02:21 AM     Lab Results   Component Value Date/Time    POTASSIUM 3.6 06/23/2022 03:23 AM    POTASSIUM 3.7 06/22/2022 02:17 AM    POTASSIUM 4.1 06/21/2022 01:49 AM     Lab Results   Component Value Date/Time    SODIUM 137 06/23/2022 03:23 AM    SODIUM 138 06/22/2022 02:17 AM    SODIUM 139 06/21/2022 01:49 AM      Lab Results   Component Value Date/Time    GLUCOSE 147 (H) 06/23/2022 03:23 AM    GLUCOSE 161 (H) 06/22/2022 02:17 AM    GLUCOSE 157 (H) 06/21/2022 01:49 AM     Lab Results   Component Value Date/Time    ASTSGOT 56 (H) 06/19/2022 10:45 PM    ASTSGOT 84 (H) 03/30/2022 05:45 AM    ASTSGOT 82 (H) 03/29/2022 08:00 PM     Lab Results   Component Value Date/Time    ALTSGPT 34 06/19/2022 10:45 PM    ALTSGPT 74 (H) 03/30/2022 05:45 AM    ALTSGPT 80 (H) 03/29/2022 08:00 PM     Lab Results   Component Value Date/Time    ALKPHOSPHAT 91 06/19/2022 10:45 PM    ALKPHOSPHAT 87 03/30/2022 05:45 AM    ALKPHOSPHAT 94 03/29/2022 08:00 PM     Lab Results   Component Value Date/Time    HBA1C 8.1 (H) 06/19/2022 10:45 PM    HBA1C 9.3 (H) 03/31/2022 05:30 AM    HBA1C 9.3 (H) 03/30/2022 05:45 AM     No results found for: TSH  Lab Results   Component Value Date/Time    NTPROBNP 8049 (H) 06/21/2022 01:49 AM    NTPROBNP 17345 (H) 06/19/2022 10:45 PM    NTPROBNP 1335 (H) 04/03/2022 04:29 AM     Lab Results   Component Value Date/Time    TROPONINT 406 (H) 06/20/2022 05:14 AM    TROPONINT 402 (H) 06/20/2022 02:09 AM    TROPONINT 407 (H) 06/20/2022 01:02 AM     Blood Counts  Lab Results   Component Value Date/Time    HEMOGLOBIN 12.6 (L) 06/23/2022 03:23 AM    HEMOGLOBIN 12.6 (L) 06/22/2022 02:17 AM    HEMOGLOBIN 13.4 (L) 06/21/2022 01:49 AM     Lab Results   Component Value Date/Time    PLATELETCT 321 06/23/2022 03:23 AM    PLATELETCT 320 06/22/2022 02:17 AM    PLATELETCT 385 06/21/2022 01:49 AM     Lab Results   Component Value Date/Time    WBC 6.7 06/23/2022 03:23 AM    WBC 8.7 06/22/2022 02:17 AM    WBC 12.7 (H) 06/21/2022 01:49 AM      Physical Examination:   General: Well-appearing, no acute distress  Eyes: Extraocular movements intact, anicteric  Neck: Supple, full range of motion, no jugular venous distension  Pulmonary: Normal respiratory effort, clear to auscultation bilaterally  Cardiovascular: Regular rate and rhythm  Extremities:  Warm and well perfused, no lower extremity edema  Neurological: Alert and oriented, no gross focal motor deficits     Past History:   Past Medical History  The patient's past medical history was reviewed.  See HPI and self-reported patient medical history form for pertinent medical history to consultation.    Past Social History  The patient's social history was reviewed.  See HPI self-reported patient medical history form for pertinent social history to consultation.    Past Family History  The patient's family history was reviewed.  See HPI self-reported patient medical history form for pertinent family history to consultation.    Review of Systems  A pertinent cardiac review of systems was performed and was otherwise unremarkable except as per HPI and self-reported patient medical history form.        Vinh Areavlo MD, [facility]  Interventional Cardiology  [facility], [address]  [address] [place] [id number]  Phone: [phone]  Fax: [phone]

## 2023-03-02 ENCOUNTER — PATIENT MESSAGE (OUTPATIENT)
Dept: CARDIOLOGY | Facility: MEDICAL CENTER | Age: 68
End: 2023-03-02
Payer: COMMERCIAL

## 2023-03-02 DIAGNOSIS — E78.2 MIXED HYPERLIPIDEMIA: ICD-10-CM

## 2023-03-02 DIAGNOSIS — I50.9 HEART FAILURE, NYHA CLASS 2 (HCC): ICD-10-CM

## 2023-03-02 DIAGNOSIS — I50.20 ACC/AHA STAGE C SYSTOLIC HEART FAILURE (HCC): ICD-10-CM

## 2023-03-02 DIAGNOSIS — I10 PRIMARY HYPERTENSION: ICD-10-CM

## 2023-03-02 DIAGNOSIS — I25.10 CORONARY ARTERY DISEASE DUE TO LIPID RICH PLAQUE: ICD-10-CM

## 2023-03-02 DIAGNOSIS — I25.83 CORONARY ARTERY DISEASE DUE TO LIPID RICH PLAQUE: ICD-10-CM

## 2023-03-03 ENCOUNTER — HOSPITAL ENCOUNTER (OUTPATIENT)
Facility: MEDICAL CENTER | Age: 68
End: 2023-03-03
Attending: PATHOLOGY
Payer: COMMERCIAL

## 2023-03-03 DIAGNOSIS — Z00.6 RESEARCH STUDY PATIENT: ICD-10-CM

## 2023-03-03 RX ORDER — SACUBITRIL AND VALSARTAN 97; 103 MG/1; MG/1
1 TABLET, FILM COATED ORAL 2 TIMES DAILY
Qty: 180 TABLET | Refills: 1 | Status: SHIPPED | OUTPATIENT
Start: 2023-03-03 | End: 2023-10-11 | Stop reason: SDUPTHER

## 2023-03-03 RX ORDER — SPIRONOLACTONE 25 MG/1
25 TABLET ORAL DAILY
Qty: 90 TABLET | Refills: 1 | Status: SHIPPED | OUTPATIENT
Start: 2023-03-03 | End: 2023-10-11 | Stop reason: SDUPTHER

## 2023-03-03 RX ORDER — ATORVASTATIN CALCIUM 80 MG/1
80 TABLET, FILM COATED ORAL EVERY EVENING
Qty: 90 TABLET | Refills: 1 | Status: SHIPPED | OUTPATIENT
Start: 2023-03-03 | End: 2023-10-11 | Stop reason: SDUPTHER

## 2023-03-03 RX ORDER — CLOPIDOGREL BISULFATE 75 MG/1
75 TABLET ORAL DAILY
Qty: 90 TABLET | Refills: 1 | Status: SHIPPED | OUTPATIENT
Start: 2023-03-03 | End: 2023-10-11 | Stop reason: SDUPTHER

## 2023-03-03 NOTE — PATIENT COMMUNICATION
Per BN last OV notes 02/27/23:  Medical Decision Making:   # Heart failure with reduced ejection fraction: This is likely multifactorial from ischemic disease as well as polysubstance abuse.  His cardiac MRI demonstrated only very mildly reduced left ventricular systolic function with a calculated ejection fraction of 51% and his most recent TTE a few months later demonstrated normalization of his left ventricular function.  Today, he continues to appear euvolemic and well compensated.  -Continue Entresto  twice daily  -Continue spironolactone to 25 mg daily  -Continue carvedilol to 25 mg twice daily  -Continue empagliflozin 25 mg daily  -Return to as needed furosemide  -He [facility]     # Severe coronary artery disease ( mid RCA,  ostial OM2): His LDL is well controlled on moderate dose atorvastatin.  His cardiac MRI did demonstrate likely viability in these vascular territories.  Given that he has had persistent shortness of breath, I again discussed that we could consider a  intervention to potentially improve his symptoms and possibly preserve myocardial function.  At this time, he did not feel that his symptoms were concerning enough to proceed with a high risk intervention.  -Continue atorvastatin 40 mg nightly  -Continue clopidogrel 75 mg daily  -Patient instructed to call the office if his symptoms worsened to the point that he would consider an intervention     # Hypertension: Mildly hypotensive today, which may be a result of furosemide.  -Discontinue daily furosemide     # Mixed hyperlipidemia: See above for medical management.     # Type 2 diabetes: Defer glucose control to PCP.  He has already been started on an SGL-2 inhibitor and a GLP-1 agonist.    -------------------------------------------------------------------------------------------------------------------  RX's renewed except for Ozempic, managed by PCP. Toon[employer] message sent to pt.

## 2023-03-09 LAB
ELF SCORE: 9.4
RELATIVE RISK: NORMAL
RISK GROUP: NORMAL
RISK: 3.3 %

## 2023-04-09 LAB
APOB+LDLR+PCSK9 GENE MUT ANL BLD/T: NOT DETECTED
BRCA1+BRCA2 DEL+DUP + FULL MUT ANL BLD/T: NOT DETECTED
MLH1+MSH2+MSH6+PMS2 GN DEL+DUP+FUL M: NOT DETECTED

## 2023-07-06 ENCOUNTER — PATIENT MESSAGE (OUTPATIENT)
Dept: CARDIOLOGY | Facility: MEDICAL CENTER | Age: 68
End: 2023-07-06
Payer: COMMERCIAL

## 2023-07-10 NOTE — TELEPHONE ENCOUNTER
To: BN    Patient reports that [facility] has [facility] his Spironolactone in half due to K being 5.4. Patient wanted to check with you that this is appropriate. Please advise. Thank you

## 2023-10-11 DIAGNOSIS — Z51.81 ENCOUNTER FOR MONITORING DIURETIC THERAPY: ICD-10-CM

## 2023-10-11 DIAGNOSIS — I25.83 CORONARY ARTERY DISEASE DUE TO LIPID RICH PLAQUE: ICD-10-CM

## 2023-10-11 DIAGNOSIS — I50.20 ACC/AHA STAGE C SYSTOLIC HEART FAILURE (HCC): ICD-10-CM

## 2023-10-11 DIAGNOSIS — Z79.899 ENCOUNTER FOR MONITORING DIURETIC THERAPY: ICD-10-CM

## 2023-10-11 DIAGNOSIS — I50.20 HFREF (HEART FAILURE WITH REDUCED EJECTION FRACTION) (HCC): ICD-10-CM

## 2023-10-11 DIAGNOSIS — I50.9 HEART FAILURE, NYHA CLASS 2 (HCC): ICD-10-CM

## 2023-10-11 DIAGNOSIS — E78.2 MIXED HYPERLIPIDEMIA: ICD-10-CM

## 2023-10-11 DIAGNOSIS — I25.10 CORONARY ARTERY DISEASE DUE TO LIPID RICH PLAQUE: ICD-10-CM

## 2023-10-11 DIAGNOSIS — I10 PRIMARY HYPERTENSION: ICD-10-CM

## 2023-10-11 RX ORDER — SACUBITRIL AND VALSARTAN 97; 103 MG/1; MG/1
1 TABLET, FILM COATED ORAL 2 TIMES DAILY
Qty: 180 TABLET | Refills: 1 | Status: SHIPPED | OUTPATIENT
Start: 2023-10-11 | End: 2023-11-01 | Stop reason: SDUPTHER

## 2023-10-11 RX ORDER — CLOPIDOGREL BISULFATE 75 MG/1
75 TABLET ORAL DAILY
Qty: 90 TABLET | Refills: 1 | Status: SHIPPED | OUTPATIENT
Start: 2023-10-11 | End: 2023-11-01 | Stop reason: SDUPTHER

## 2023-10-11 RX ORDER — CARVEDILOL 25 MG/1
25 TABLET ORAL 2 TIMES DAILY WITH MEALS
Qty: 180 TABLET | Refills: 3 | Status: SHIPPED | OUTPATIENT
Start: 2023-10-11 | End: 2023-11-01 | Stop reason: SDUPTHER

## 2023-10-11 RX ORDER — ATORVASTATIN CALCIUM 80 MG/1
80 TABLET, FILM COATED ORAL EVERY EVENING
Qty: 90 TABLET | Refills: 1 | Status: SHIPPED | OUTPATIENT
Start: 2023-10-11 | End: 2023-11-01 | Stop reason: SDUPTHER

## 2023-10-11 RX ORDER — SPIRONOLACTONE 25 MG/1
25 TABLET ORAL DAILY
Qty: 90 TABLET | Refills: 1 | Status: SHIPPED | OUTPATIENT
Start: 2023-10-11 | End: 2023-11-01 | Stop reason: SDUPTHER

## 2023-10-12 NOTE — TELEPHONE ENCOUNTER
Medication sent to preferred pharmacy as requested, courtesy.    Lab ordered, lab slip printed, and mailed to pt mailing address.

## 2023-10-13 ENCOUNTER — TELEPHONE (OUTPATIENT)
Dept: CARDIOLOGY | Facility: MEDICAL CENTER | Age: 68
End: 2023-10-13
Payer: COMMERCIAL

## 2023-10-13 NOTE — TELEPHONE ENCOUNTER
"BN    Caller: Tucker Frederick    Topic/issue: Patient calling and states that the [facility] told him that our office needs to submit a \"request for services\" for his labs and medications to be covered by the [facility]. Please advise.    Callback Number: [phone] (home)      Thank you,  Karyn TAYLOR"

## 2023-10-19 ENCOUNTER — TELEPHONE (OUTPATIENT)
Dept: CARDIOLOGY | Facility: MEDICAL CENTER | Age: 68
End: 2023-10-19
Payer: COMMERCIAL

## 2023-10-19 NOTE — TELEPHONE ENCOUNTER
ISREAL          Caller: Doris with the [facility]    Topic/issue: Their office was calling because they need another authorization submitted to their office or the patient's appointments will not be covered    Callback Number: see below      Thank you    -Taco SOMERS

## 2023-10-19 NOTE — TELEPHONE ENCOUNTER
ISREAL          Caller: Radha with [facility]    Topic/issue: They also needed to include the authorization for the patient's medication with this request and they were asking for a call back    Callback Number: [phone]      Thank you    -Taco SOMERS

## 2023-10-19 NOTE — TELEPHONE ENCOUNTER
See telephone note signed by KEY MARQUEZ 10/18/2023.    Returned Doris's call and reviewed findings.  Advised to return this call if she needs forms to be sent back sooner than when BN is back in office.     Signature pending.

## 2023-10-25 NOTE — CONSULTS
Diabetes education: Pt has a hx of diabetes on Jardiance, Actos and metformin ( and per pt glipizide?) prior to admit. Pt [facility] but acknowledges if new meds, will need to [facility] them [facility] at [facility] on [facility] ( [facility]).  Pt was admitted with blood sugar of 622 and Hga1c of 9.3%. Pt states he has been out of his Jardiance ( maybe other meds) for about 10 days.  Pt is currently on Glargine 30 units in AM, with Regular insulin per sliding scale coverage ac and hs. Blood sugars are 157 ( 3 units), 169  ( 3 units), and  174 ( 3 units).  Met with pt this evening. Pt states he hopes to resume his oral agents and not go on insulin but he agrees to give his insulin with nursing. Pt has a meter and tests strips but onlyt tests every 2 weeks. Discussed need to test at least once a day ( rotating ac and hs) if on pills more if on insulin.  Discussed hyperglycemia, hypoglycemia and insulin.  Plan: CDE to follow up tomorrow. Please have pt give his insulin with nursing. CDE will teach pens if needed.   Epidermal Sutures: 5-0 Prolene

## 2023-11-01 ENCOUNTER — TELEPHONE (OUTPATIENT)
Dept: CARDIOLOGY | Facility: MEDICAL CENTER | Age: 68
End: 2023-11-01
Payer: COMMERCIAL

## 2023-11-01 DIAGNOSIS — E78.2 MIXED HYPERLIPIDEMIA: ICD-10-CM

## 2023-11-01 DIAGNOSIS — I25.83 CORONARY ARTERY DISEASE DUE TO LIPID RICH PLAQUE: ICD-10-CM

## 2023-11-01 DIAGNOSIS — I50.20 ACC/AHA STAGE C SYSTOLIC HEART FAILURE (HCC): ICD-10-CM

## 2023-11-01 DIAGNOSIS — I50.20 HFREF (HEART FAILURE WITH REDUCED EJECTION FRACTION) (HCC): ICD-10-CM

## 2023-11-01 DIAGNOSIS — I25.10 CORONARY ARTERY DISEASE DUE TO LIPID RICH PLAQUE: ICD-10-CM

## 2023-11-01 DIAGNOSIS — I50.9 HEART FAILURE, NYHA CLASS 2 (HCC): ICD-10-CM

## 2023-11-01 DIAGNOSIS — I10 PRIMARY HYPERTENSION: ICD-10-CM

## 2023-11-01 RX ORDER — SPIRONOLACTONE 25 MG/1
25 TABLET ORAL DAILY
Qty: 90 TABLET | Refills: 1 | Status: SHIPPED | OUTPATIENT
Start: 2023-11-01 | End: 2024-02-20 | Stop reason: SDUPTHER

## 2023-11-01 RX ORDER — CLOPIDOGREL BISULFATE 75 MG/1
75 TABLET ORAL DAILY
Qty: 90 TABLET | Refills: 1 | Status: SHIPPED | OUTPATIENT
Start: 2023-11-01 | End: 2024-02-20 | Stop reason: SDUPTHER

## 2023-11-01 RX ORDER — SACUBITRIL AND VALSARTAN 97; 103 MG/1; MG/1
1 TABLET, FILM COATED ORAL 2 TIMES DAILY
Qty: 180 TABLET | Refills: 1 | Status: SHIPPED | OUTPATIENT
Start: 2023-11-01 | End: 2024-02-21 | Stop reason: SDUPTHER

## 2023-11-01 RX ORDER — ATORVASTATIN CALCIUM 80 MG/1
80 TABLET, FILM COATED ORAL EVERY EVENING
Qty: 90 TABLET | Refills: 1 | Status: SHIPPED | OUTPATIENT
Start: 2023-11-01 | End: 2024-02-20 | Stop reason: SDUPTHER

## 2023-11-01 RX ORDER — CARVEDILOL 25 MG/1
25 TABLET ORAL 2 TIMES DAILY WITH MEALS
Qty: 180 TABLET | Refills: 1 | Status: SHIPPED | OUTPATIENT
Start: 2023-11-01 | End: 2024-02-20 | Stop reason: SDUPTHER

## 2023-11-01 NOTE — TELEPHONE ENCOUNTER
BN    Caller: Tucker Frederick    Topic/issue: MEDICATION MANAGEMENT     Tucker states that due to his community care expiring, he was unable to  his medication at the time that they were prescribed. Tucker now has coverage and the [facility] is requesting new scripts for the following medications. Please advise.    CARVEDILOL 25 MG  ATORVASTATIN 80 MG  CLOPIDOGREL 75 MG  ENTRESTO  MG  SPIRONOLACTONE 25 MG    Thank you,  Bayron RUSS    Callback Number: [phone] (home)

## 2023-11-03 NOTE — TELEPHONE ENCOUNTER
Upon chart review, RFS completed and imported onto media 10/20/2023.  See telephone note signed by FSL, 11/1/2023.  Noted findings.    Received signed form from MD.  Form sent to scanning via [employer], completed status received.

## 2024-01-26 ENCOUNTER — HOSPITAL ENCOUNTER (OUTPATIENT)
Dept: LAB | Facility: MEDICAL CENTER | Age: 69
End: 2024-01-26
Attending: INTERNAL MEDICINE
Payer: COMMERCIAL

## 2024-01-26 DIAGNOSIS — E78.2 MIXED HYPERLIPIDEMIA: ICD-10-CM

## 2024-01-26 DIAGNOSIS — I50.20 HFREF (HEART FAILURE WITH REDUCED EJECTION FRACTION) (HCC): ICD-10-CM

## 2024-01-26 DIAGNOSIS — Z51.81 ENCOUNTER FOR MONITORING DIURETIC THERAPY: ICD-10-CM

## 2024-01-26 DIAGNOSIS — Z79.899 ENCOUNTER FOR MONITORING DIURETIC THERAPY: ICD-10-CM

## 2024-01-26 LAB
ANION GAP SERPL CALC-SCNC: 15 MMOL/L (ref 7–16)
BUN SERPL-MCNC: 25 MG/DL (ref 8–22)
CALCIUM SERPL-MCNC: 9.3 MG/DL (ref 8.5–10.5)
CHLORIDE SERPL-SCNC: 104 MMOL/L (ref 96–112)
CHOLEST SERPL-MCNC: 141 MG/DL (ref 100–199)
CO2 SERPL-SCNC: 20 MMOL/L (ref 20–33)
CREAT SERPL-MCNC: 1.05 MG/DL (ref 0.5–1.4)
GFR SERPLBLD CREATININE-BSD FMLA CKD-EPI: 78 ML/MIN/1.73 M 2
GLUCOSE SERPL-MCNC: 134 MG/DL (ref 65–99)
HDLC SERPL-MCNC: 40 MG/DL
LDLC SERPL CALC-MCNC: 55 MG/DL
MAGNESIUM SERPL-MCNC: 2.1 MG/DL (ref 1.5–2.5)
POTASSIUM SERPL-SCNC: 5.6 MMOL/L (ref 3.6–5.5)
SODIUM SERPL-SCNC: 139 MMOL/L (ref 135–145)
TRIGL SERPL-MCNC: 228 MG/DL (ref 0–149)

## 2024-01-26 PROCEDURE — 83735 ASSAY OF MAGNESIUM: CPT

## 2024-01-26 PROCEDURE — 36415 COLL VENOUS BLD VENIPUNCTURE: CPT

## 2024-01-26 PROCEDURE — 80061 LIPID PANEL: CPT

## 2024-01-26 PROCEDURE — 80048 BASIC METABOLIC PNL TOTAL CA: CPT

## 2024-02-05 ENCOUNTER — OFFICE VISIT (OUTPATIENT)
Dept: CARDIOLOGY | Facility: MEDICAL CENTER | Age: 69
End: 2024-02-05
Attending: PSYCHIATRY & NEUROLOGY
Payer: COMMERCIAL

## 2024-02-05 VITALS
SYSTOLIC BLOOD PRESSURE: 108 MMHG | HEIGHT: 66 IN | RESPIRATION RATE: 16 BRPM | DIASTOLIC BLOOD PRESSURE: 64 MMHG | HEART RATE: 84 BPM | OXYGEN SATURATION: 94 % | BODY MASS INDEX: 27 KG/M2 | WEIGHT: 168 LBS

## 2024-02-05 DIAGNOSIS — F10.21 HISTORY OF ALCOHOL DEPENDENCE (HCC): ICD-10-CM

## 2024-02-05 DIAGNOSIS — I50.20 HFREF (HEART FAILURE WITH REDUCED EJECTION FRACTION) (HCC): ICD-10-CM

## 2024-02-05 DIAGNOSIS — I10 PRIMARY HYPERTENSION: ICD-10-CM

## 2024-02-05 DIAGNOSIS — E78.2 MIXED HYPERLIPIDEMIA: ICD-10-CM

## 2024-02-05 DIAGNOSIS — F15.11 HISTORY OF METHAMPHETAMINE ABUSE (HCC): ICD-10-CM

## 2024-02-05 PROBLEM — F15.10 METHAMPHETAMINE ABUSE (HCC): Status: RESOLVED | Noted: 2022-03-31 | Resolved: 2024-02-05

## 2024-02-05 PROBLEM — F10.20 ALCOHOL DEPENDENCY (HCC): Status: RESOLVED | Noted: 2022-03-30 | Resolved: 2024-02-05

## 2024-02-05 PROCEDURE — 99214 OFFICE O/P EST MOD 30 MIN: CPT | Performed by: INTERNAL MEDICINE

## 2024-02-05 PROCEDURE — 99213 OFFICE O/P EST LOW 20 MIN: CPT | Performed by: INTERNAL MEDICINE

## 2024-02-05 PROCEDURE — 3078F DIAST BP <80 MM HG: CPT | Performed by: INTERNAL MEDICINE

## 2024-02-05 PROCEDURE — 3074F SYST BP LT 130 MM HG: CPT | Performed by: INTERNAL MEDICINE

## 2024-02-05 ASSESSMENT — FIBROSIS 4 INDEX: FIB4 SCORE: 2

## 2024-02-05 NOTE — PROGRESS NOTES
CARDIOLOGY CLINIC NOTE      Date of Visit: 2/5/2024    Primary Care Provider: OG Fournier    Patient Name: Tucker Frederick    YOB: 1955  MRN: [id number]     Reason for Visit:   Follow-up     Patient Story:   Tucker Frederick is a 68 year-old gentleman with a past medical history of heart failure with reduced ejection fraction, severe coronary artery disease ( of RCA and OM2), type 2 diabetes, hypertension, dyslipidemia, prior methamphetamine abuse, and alcohol abuse. Briefly, he was recently admitted to [facility] with worsening shortness of breath and abdominal distention.  He was found to have newly diagnosed left ventricular systolic dysfunction as well as a significantly elevated NT-proBNP and troponin.  He underwent coronary angiography to further evaluate his abnormalities, which demonstrated chronic occlusions of the mid RCA and ostial OM2.  He was evaluated by CT Surgery and was not felt to be a surgical candidate.  After diuresis and initiation of a neurohormonal regimen, he was discharged with Cardiology follow-up.    At his initial clinic visit, he reported feeling much better with no concerning weight gain.  He denied recurrent methamphetamine use and reported that he had reduced his alcoholic drinks daily to about 3-5.  In follow-up, he was titrated onto a complete neurohormonal blockade regimen at goal doses.  He initially did well, but [facility] and [facility] after a methamphetamine relapse. His neurohormonal regimen was signficantly reduced at that time, but was subsequently restarted and uptitrated in follow-up.  He was ultimately stopped on furosemide without recurrent weight gain.  In October 2022, while traveling [place] he was also admitted with decompensated heart failure and acute kidney injury due to another relapse of methamphetamine use.  However, since then he has been abstinent.    Overall, he was doing well at his last appointment  with only mild shortness of breath with higher levels of exertion.  Today, he reports continuing to do well and denies any chest pain or shortness of breath.  He again reports that he has been completely abstinent of methamphetamines and knows that when he relapsed in the past this immediately put him into the hospital.     Medications and Allergies:     Current Outpatient Medications   Medication Sig Dispense Refill    carvedilol (COREG) 25 MG Tab Take 1 Tablet by mouth 2 times a day with meals. 180 Tablet 1    atorvastatin (LIPITOR) 80 MG tablet Take 1 Tablet by mouth every evening. 90 Tablet 1    clopidogrel (PLAVIX) 75 MG Tab Take 1 Tablet by mouth every day. 90 Tablet 1    sacubitril-valsartan (ENTRESTO)  MG Tab Take 1 Tablet by mouth 2 times a day. 180 Tablet 1    spironolactone (ALDACTONE) 25 MG Tab Take 1 Tablet by mouth every day. 90 Tablet 1    diphenhydrAMINE (BENADRYL) 12.5 MG/5ML Liquid liquid Take 12.5 mg by mouth at bedtime. alternates with trazodone      diclofenac sodium (VOLTAREN) 1 % Gel Apply  topically 4 times a day as needed.      Semaglutide,0.25 or 0.5MG/DOS, (OZEMPIC, 0.25 OR 0.5 MG/DOSE,) 2 MG/1.5ML Solution Pen-injector       furosemide (LASIX) 20 MG Tab Take 1 Tablet by mouth 1 time a day as needed (as needed for weight gain greater than 3 pounds in 1 day or 5 pounds in 1 week.). 30 Tablet 5    Cyanocobalamin (VITAMIN B 12 PO) Take 1 Tablet by mouth every day.      Multiple Vitamin (MULTIVITAMIN ADULT PO) Take  by mouth.      Empagliflozin 25 MG Tab Take 25 mg by mouth every day.      ferrous gluconate (FERGON) 324 (38 Fe) MG Tab Take 324 mg by mouth every morning with breakfast.      traZODone (DESYREL) 100 MG Tab Take 100 mg by mouth every evening.      metformin (GLUCOPHAGE) 1000 MG tablet Take 1,000 mg by mouth 2 times a day with meals.       No current facility-administered medications for this visit.     No Known Allergies     Medical Decision Making:   # Heart failure with  recovered ejection fraction: This is likely multifactorial from ischemic disease as well as polysubstance abuse.  His most recent echocardiogram demonstrated normalization of his left ventricular systolic function.  Today, he continues to appear euvolemic and well compensated.  -Continue Entresto  mg twice daily  -Continue spironolactone to 25 mg daily  -Continue carvedilol to 25 mg twice daily  -Continue empagliflozin 25 mg daily  -Continue furosemide for weight gain/edema  -He [facility] q6 [facility]    # Severe coronary artery disease ( mid RCA,  ostial OM2): His LDL is well-controlled on moderate dose atorvastatin.  His cardiac MRI did demonstrate likely viability in these vascular territories.  We previously discussed that we could consider a  intervention to potentially improve his symptoms and possibly preserve myocardial function in the future, although this is only speculative.  We discussed these issues again today, but he again did not wish to pursue an intervention as he was relatively asymptomatic.  -Continue atorvastatin 40 mg nightly  -Continue clopidogrel 75 mg daily    # Hypertension: Well-controlled today  -As above    # Mixed hyperlipidemia: We discussed possibly starting icosapent ethyl, but he was not interested at this time.    # Type 2 diabetes: Defer glucose control to PCP.  He has already been started on an SGL-2 inhibitor and a GLP-1 agonist, which are excellent choices for him.    # History of polysubstance abuse: He has been free of methamphetamines, he does continue to intermittently drink alcohol, but significantly lower amounts than previously.    Follow Up  6 months     Cardiac Studies and Procedures:   Echocardiography  TTE (2/17/2023)  Preserved left ventricular systolic function.  The left ventricular ejection fraction is visually estimated to be 55-60%.  There is hypokinesis of the mid to distal anterolateral and posterolateral  "walls.  Grade I diastolic function.  The right ventricle is normal in size and systolic function.  Unable to estimate right ventricular systolic pressure due to an inadequate tricuspid regurgitant jet.  The left atrium is normal in size.  No significant valvular abnormalities.   Normal inferior vena cava size and inspiratory collapse.    TTE (6/21/2022)  Mildly reduced left ventricular systolic function.  The left ventricular ejection fraction is visually estimated to be 40-45%.  Hypokinesis of the posterior wall.  The right ventricle is normal in size and systolic function.  No valvular abnormalities.     TTE (3/30/2021)  Normal left ventricular chamber size.  Moderately reduced left ventricular systolic function.  The left ventricular ejection fraction is visually estimated to be 30-35%.  Global hypokinesis with severe hypokinesis basal inferolateral wall.  Probably mildly dilated right venricular with reduced right ventricular systolic function and hypokinetic right ventricular free wall.  No valvular abnormalities.     CT/MRI  Cardiac MRI (6/30/2022)  1.  Subendocardial enhancement involving the inferior and inferolateral myocardium involving slightly less than 50% of the thickness of the thinned myocardial wall extending from the base to the apical segments. Subendocardial enhancement of the lateral   wall in the cardiac apex, less than 50% of the wall thickness.  2.  Mildly diminished ejection fraction with hypokinesis of the inferior and inferolateral myocardium. Calculated ejection fraction of 51%.    Coronary Angiography/Cardiac Catheterization  CAG (4/1/2022)  LM: Patent  LAD: Proximal 20%, mid 30%, mid-distal 50%  LCx: Proximal 20%, ostial OM1 50%, ostial  OM2  RCA: Proximal 50%, mid      Vital Signs:   /64 (BP Location: Left arm, Patient Position: Sitting)   Pulse 84   Resp 16   Ht 1.676 m (5' 6\")   Wt 76.2 kg (168 lb)   SpO2 94%    BP Readings from Last 4 Encounters:   02/05/24 108/64 " "  02/27/23 (!) 88/52   08/01/22 110/62   07/12/22 106/60     Wt Readings from Last 4 Encounters:   02/05/24 76.2 kg (168 lb)   02/27/23 77.6 kg (171 lb)   08/01/22 77.1 kg (170 lb)   07/12/22 75.8 kg (167 lb)     Body mass index is 27.12 kg/m².     Laboratories:   [facility] (5/8/2022)  Glucose 169  BUN 16  Cr 1.1  Na 138  K 4.2     [facility] (4/15/2022)  Glucose 214  BUN 26  Cr 1.2  Na 133  K 4.4  GFR 67  HDL 37  LDL 57    Total 118    Lipids  Lab Results   Component Value Date/Time    LDL 55 01/26/2024 10:39 AM    LDL 80 06/21/2022 01:49 AM    LDL 64 03/31/2022 05:30 AM     (H) 03/30/2022 05:45 AM       No results found for: \"LDLCALC\"  Lab Results   Component Value Date/Time    HDL 40 01/26/2024 10:39 AM    HDL 46 06/21/2022 01:49 AM    HDL 59 03/31/2022 05:30 AM    HDL 71 03/30/2022 05:45 AM       Lab Results   Component Value Date/Time    TRIGLYCERIDE 228 (H) 01/26/2024 10:39 AM    TRIGLYCERIDE 205 (H) 06/21/2022 01:49 AM    TRIGLYCERIDE 178 (H) 03/31/2022 05:30 AM    TRIGLYCERIDE 177 (H) 03/30/2022 05:45 AM       Lab Results   Component Value Date/Time    CHOLSTRLTOT 141 01/26/2024 10:39 AM    CHOLSTRLTOT 167 06/21/2022 01:49 AM    CHOLSTRLTOT 159 03/31/2022 05:30 AM    CHOLSTRLTOT 207 (H) 03/30/2022 05:45 AM       No results found for: \"LIPOPROTA\"      Chemistries  Lab Results   Component Value Date/Time    CREATININE 1.05 01/26/2024 10:39 AM    CREATININE 1.52 (H) 06/23/2022 03:23 AM    CREATININE 1.67 (H) 06/22/2022 02:17 AM    CREATININE 1.45 (H) 06/21/2022 01:49 AM    CREATININE 1.63 (H) 06/19/2022 10:45 PM     Lab Results   Component Value Date/Time    BUN 25 (H) 01/26/2024 10:39 AM    BUN 51 (H) 06/23/2022 03:23 AM    BUN 45 (H) 06/22/2022 02:17 AM    BUN 34 (H) 06/21/2022 01:49 AM    BUN 31 (H) 06/19/2022 10:45 PM     Lab Results   Component Value Date/Time    POTASSIUM 5.6 (H) 01/26/2024 10:39 AM    POTASSIUM 3.6 06/23/2022 03:23 AM    POTASSIUM 3.7 06/22/2022 02:17 AM     Lab Results " "  Component Value Date/Time    SODIUM 139 01/26/2024 10:39 AM    SODIUM 137 06/23/2022 03:23 AM    SODIUM 138 06/22/2022 02:17 AM     Lab Results   Component Value Date/Time    GLUCOSE 134 (H) 01/26/2024 10:39 AM    GLUCOSE 147 (H) 06/23/2022 03:23 AM    GLUCOSE 161 (H) 06/22/2022 02:17 AM     Lab Results   Component Value Date/Time    ASTSGOT 56 (H) 06/19/2022 10:45 PM    ASTSGOT 84 (H) 03/30/2022 05:45 AM    ASTSGOT 82 (H) 03/29/2022 08:00 PM     Lab Results   Component Value Date/Time    ALTSGPT 34 06/19/2022 10:45 PM    ALTSGPT 74 (H) 03/30/2022 05:45 AM    ALTSGPT 80 (H) 03/29/2022 08:00 PM     Lab Results   Component Value Date/Time    ALKPHOSPHAT 91 06/19/2022 10:45 PM    ALKPHOSPHAT 87 03/30/2022 05:45 AM    ALKPHOSPHAT 94 03/29/2022 08:00 PM     Lab Results   Component Value Date/Time    HBA1C 8.1 (H) 06/19/2022 10:45 PM    HBA1C 9.3 (H) 03/31/2022 05:30 AM    HBA1C 9.3 (H) 03/30/2022 05:45 AM     No results found for: \"TSH\"  Lab Results   Component Value Date/Time    NTPROBNP 8049 (H) 06/21/2022 01:49 AM    NTPROBNP 82810 (H) 06/19/2022 10:45 PM    NTPROBNP 1335 (H) 04/03/2022 04:29 AM     Lab Results   Component Value Date/Time    TROPONINT 406 (H) 06/20/2022 05:14 AM    TROPONINT 402 (H) 06/20/2022 02:09 AM    TROPONINT 407 (H) 06/20/2022 01:02 AM     Blood Counts  Lab Results   Component Value Date/Time    HEMOGLOBIN 12.6 (L) 06/23/2022 03:23 AM    HEMOGLOBIN 12.6 (L) 06/22/2022 02:17 AM    HEMOGLOBIN 13.4 (L) 06/21/2022 01:49 AM     Lab Results   Component Value Date/Time    PLATELETCT 321 06/23/2022 03:23 AM    PLATELETCT 320 06/22/2022 02:17 AM    PLATELETCT 385 06/21/2022 01:49 AM     Lab Results   Component Value Date/Time    WBC 6.7 06/23/2022 03:23 AM    WBC 8.7 06/22/2022 02:17 AM    WBC 12.7 (H) 06/21/2022 01:49 AM      Physical Examination:   General: Well-appearing, no acute distress  Eyes: Extraocular movements intact, anicteric  Neck: Full range of motion, no gross jugular venous " distension  Pulmonary: Normal respiratory effort, no distress  Cardiovascular: Regular rate and rhythm  Extremities: Warm and well perfused, no lower extremity edema  Neurological: Alert and oriented, no gross focal motor deficits     Past History:   Past Medical History  The patient's past medical history was reviewed.  See HPI and self-reported patient medical history form for pertinent medical history to consultation.    Past Social History  The patient's social history was reviewed.  See HPI self-reported patient medical history form for pertinent social history to consultation.    Past Family History  The patient's family history was reviewed.  See [facility] self-reported patient medical history form for pertinent family history to consultation.    Review of Systems  A pertinent cardiac review of systems was performed and was otherwise unremarkable except as per HPI and self-reported patient medical history form.        Vinh Arevalo MD, [facility]  Interventional Cardiology  [facility], [facility]  [address] [address]  Phone: [phone]  Fax: [phone]

## 2024-02-21 DIAGNOSIS — I10 PRIMARY HYPERTENSION: ICD-10-CM

## 2024-02-21 DIAGNOSIS — I50.9 HEART FAILURE, NYHA CLASS 2 (HCC): ICD-10-CM

## 2024-02-21 DIAGNOSIS — I50.20 ACC/AHA STAGE C SYSTOLIC HEART FAILURE (HCC): ICD-10-CM

## 2024-02-21 RX ORDER — SACUBITRIL AND VALSARTAN 97; 103 MG/1; MG/1
1 TABLET, FILM COATED ORAL 2 TIMES DAILY
Qty: 180 TABLET | Refills: 1 | Status: SHIPPED
Start: 2024-02-21

## 2024-02-26 ENCOUNTER — TELEPHONE (OUTPATIENT)
Dept: CARDIOLOGY | Facility: MEDICAL CENTER | Age: 69
End: 2024-02-26
Payer: COMMERCIAL

## 2024-02-26 NOTE — TELEPHONE ENCOUNTER
BN    Caller: Dominga with [facility]    Topic/issue: Their fax is down, so having to call and verify exactly what was sent for this prescription: name, amount, etc.     Prescription: sacubitril-valsartan (ENTRESTO)  MG Tab    Callback Number: ph: [phone]     Thank you,  Karyn TAYLOR

## 2024-02-26 NOTE — TELEPHONE ENCOUNTER
Returned Dominga's call, s/w Sukh, and reviewed active prescription recommendations.  He verbalizes understanding and states no other concerns or questions at this time.  Sukh is appreciative of information given.

## 2024-06-24 DIAGNOSIS — I25.83 CORONARY ARTERY DISEASE DUE TO LIPID RICH PLAQUE: ICD-10-CM

## 2024-06-24 DIAGNOSIS — E78.2 MIXED HYPERLIPIDEMIA: ICD-10-CM

## 2024-06-24 DIAGNOSIS — I25.10 CORONARY ARTERY DISEASE DUE TO LIPID RICH PLAQUE: ICD-10-CM

## 2024-06-24 RX ORDER — ATORVASTATIN CALCIUM 80 MG/1
80 TABLET, FILM COATED ORAL EVERY EVENING
Qty: 90 TABLET | Refills: 2 | Status: SHIPPED | OUTPATIENT
Start: 2024-06-24

## 2024-06-24 NOTE — TELEPHONE ENCOUNTER
Is the patient due for a refill? Yes    Was the patient seen the past year? Yes    Date of last office visit: 2/5/24    Does the patient have an upcoming appointment?  Yes   If yes, When? 8/22/24    Provider to refill:BN    Does the patients insurance require a 100 day supply?  No

## 2024-08-20 NOTE — PROGRESS NOTES
CARDIOLOGY CLINIC NOTE      Date of Visit: 8/20/2024    Primary Care Provider: OG Fournier    Patient Name: Tucker Fredercik    YOB: 1955  MRN: [id number]     Reason for Visit:   Follow-up     Patient Story:   Tucker Frederick is a 69 year-old gentleman with a past medical history of heart failure with reduced ejection fraction, severe coronary artery disease ( of RCA and OM2), type 2 diabetes, hypertension, dyslipidemia, prior methamphetamine abuse, and alcohol abuse. Briefly, he was recently admitted to [facility] [place] with worsening shortness of breath and abdominal distention.  He was found to have newly diagnosed left ventricular systolic dysfunction as well as a significantly elevated NT-proBNP and troponin.  He underwent coronary angiography to further evaluate his abnormalities, which demonstrated chronic occlusions of the mid RCA and ostial OM2.  He was evaluated by CT Surgery and was not felt to be a surgical candidate.  After diuresis and initiation of a neurohormonal regimen, he was discharged with Cardiology follow-up.    At his initial clinic visit, he reported feeling much better with no concerning weight gain.  He denied recurrent methamphetamine use and reported that he had reduced his alcoholic drinks daily to about 3-5.  In follow-up, he was titrated onto a complete neurohormonal blockade regimen at goal doses.  He initially did well, but [facility] and [facility]ated after a methamphetamine relapse. His neurohormonal regimen was signficantly reduced at that time, but was subsequently restarted and uptitrated in follow-up.  He was ultimately stopped on furosemide without recurrent weight gain.  In October 2022, while traveling [place] he was also admitted with decompensated heart failure and acute kidney injury due to another relapse of methamphetamine use.  However, since then he has been abstinent.    At his last clinic visit, he was doing well and  denied any significant chest pain or shortness of breath.  He again reported being completely abstinent of methamphetamines and knows that when he relapsed in the past this immediately put him into the hospital.     Medications and Allergies:     Current Outpatient Medications   Medication Sig Dispense Refill    atorvastatin (LIPITOR) 80 MG tablet Take 1 Tablet by mouth every evening. 90 Tablet 2    carvedilol (COREG) 25 MG Tab Take 1 Tablet by mouth 2 times a day with meals. 180 Tablet 1    clopidogrel (PLAVIX) 75 MG Tab Take 1 Tablet by mouth every day. 90 Tablet 1    spironolactone (ALDACTONE) 25 MG Tab Take 1 Tablet by mouth every day. 90 Tablet 1    sacubitril-valsartan (ENTRESTO)  MG Tab Take 1 Tablet by mouth 2 times a day. 180 Tablet 1    diphenhydrAMINE (BENADRYL) 12.5 MG/5ML Liquid liquid Take 12.5 mg by mouth at bedtime. alternates with trazodone      diclofenac sodium (VOLTAREN) 1 % Gel Apply  topically 4 times a day as needed.      Semaglutide,0.25 or 0.5MG/DOS, (OZEMPIC, 0.25 OR 0.5 MG/DOSE,) 2 MG/1.5ML Solution Pen-injector       furosemide (LASIX) 20 MG Tab Take 1 Tablet by mouth 1 time a day as needed (as needed for weight gain greater than 3 pounds in 1 day or 5 pounds in 1 week.). 30 Tablet 5    Cyanocobalamin (VITAMIN B 12 PO) Take 1 Tablet by mouth every day.      Multiple Vitamin (MULTIVITAMIN ADULT PO) Take  by mouth.      Empagliflozin 25 MG Tab Take 25 mg by mouth every day.      ferrous gluconate (FERGON) 324 (38 Fe) MG Tab Take 324 mg by mouth every morning with breakfast.      traZODone (DESYREL) 100 MG Tab Take 100 mg by mouth every evening.      metformin (GLUCOPHAGE) 1000 MG tablet Take 1,000 mg by mouth 2 times a day with meals.       No current facility-administered medications for this visit.     No Known Allergies     Medical Decision Making:   # Heart failure with recovered ejection fraction: This is likely multifactorial from ischemic disease as well as polysubstance abuse.   His most recent echocardiogram demonstrated normalization of his left ventricular systolic function.  Today, he continues to appear euvolemic and well compensated.  -Continue Entresto  mg twice daily  -Continue spironolactone to 25 mg daily  -Continue carvedilol to 25 mg twice daily  -Continue empagliflozin 25 mg daily  -Continue furosemide for weight gain/edema  -He [facility] q6 [facility]    # Severe coronary artery disease ( mid RCA,  ostial OM2): His LDL is well-controlled on moderate dose atorvastatin.  His cardiac MRI did demonstrate likely viability in these vascular territories.  We previously discussed that we could consider a  intervention to potentially improve his symptoms and possibly preserve myocardial function in the future, although this is only speculative.  We discussed these issues again today, but he again did not wish to pursue an intervention as he was relatively asymptomatic.  -Continue atorvastatin 40 mg nightly  -Continue clopidogrel 75 mg daily    # Hypertension: Well-controlled today  -As above    # Mixed hyperlipidemia: We discussed possibly starting icosapent ethyl, but he was not interested at this time.    # Type 2 diabetes: Defer glucose control to PCP.  He has already been started on an SGL-2 inhibitor and a GLP-1 agonist, which are excellent choices for him.    # History of polysubstance abuse: He has been free of methamphetamines, he does continue to intermittently drink alcohol, but significantly lower amounts than previously.    Longitudinal Care  Today's visit is associated with medical care services that serve as the continuing focal point for all necessary health care services related to the patient's single, serious condition or multiple chronic complex conditions.  This includes providing services to the patient on an ongoing basis that results in care that is collaborative and personalized to the patient.  The services result in a  comprehensive, longitudinal, and continuous relationship with the patient and involve delivery of team-based care that is accessible, coordinated with other practitioners and providers, and integrated with the broader health care landscape.    Follow Up  6 months     Cardiac Studies and Procedures:   Echocardiography  TTE (2/17/2023)  Preserved left ventricular systolic function.  The left ventricular ejection fraction is visually estimated to be 55-60%.  There is hypokinesis of the mid to distal anterolateral and posterolateral walls.  Grade I diastolic function.  The right ventricle is normal in size and systolic function.  Unable to estimate right ventricular systolic pressure due to an inadequate tricuspid regurgitant jet.  The left atrium is normal in size.  No significant valvular abnormalities.   Normal inferior vena cava size and inspiratory collapse.    TTE (6/21/2022)  Mildly reduced left ventricular systolic function.  The left ventricular ejection fraction is visually estimated to be 40-45%.  Hypokinesis of the posterior wall.  The right ventricle is normal in size and systolic function.  No valvular abnormalities.     TTE (3/30/2021)  Normal left ventricular chamber size.  Moderately reduced left ventricular systolic function.  The left ventricular ejection fraction is visually estimated to be 30-35%.  Global hypokinesis with severe hypokinesis basal inferolateral wall.  Probably mildly dilated right venricular with reduced right ventricular systolic function and hypokinetic right ventricular free wall.  No valvular abnormalities.     CT/MRI  Cardiac MRI (6/30/2022)  1.  Subendocardial enhancement involving the inferior and inferolateral myocardium involving slightly less than 50% of the thickness of the thinned myocardial wall extending from the base to the apical segments. Subendocardial enhancement of the lateral   wall in the cardiac apex, less than 50% of the wall thickness.  2.  Mildly diminished  "ejection fraction with hypokinesis of the inferior and inferolateral myocardium. Calculated ejection fraction of 51%.    Coronary Angiography/Cardiac Catheterization  CAG (4/1/2022)  LM: Patent  LAD: Proximal 20%, mid 30%, mid-distal 50%  LCx: Proximal 20%, ostial OM1 50%, ostial  OM2  RCA: Proximal 50%, mid      Vital Signs:   There were no vitals taken for this visit.   BP Readings from Last 4 Encounters:   02/05/24 108/64   02/27/23 (!) 88/52   08/01/22 110/62   07/12/22 106/60     Wt Readings from Last 4 Encounters:   02/05/24 76.2 kg (168 lb)   02/27/23 77.6 kg (171 lb)   08/01/22 77.1 kg (170 lb)   07/12/22 75.8 kg (167 lb)     There is no height or weight on file to calculate BMI.     Laboratories:   [facility] (5/8/2022)  Glucose 169  BUN 16  Cr 1.1  Na 138  K 4.2     [facility] (4/15/2022)  Glucose 214  BUN 26  Cr 1.2  Na 133  K 4.4  GFR 67  HDL 37  LDL 57    Total 118    Lipids  Lab Results   Component Value Date/Time    LDL 55 01/26/2024 10:39 AM    LDL 80 06/21/2022 01:49 AM    LDL 64 03/31/2022 05:30 AM     (H) 03/30/2022 05:45 AM       No results found for: \"LDLCALC\"  Lab Results   Component Value Date/Time    HDL 40 01/26/2024 10:39 AM    HDL 46 06/21/2022 01:49 AM    HDL 59 03/31/2022 05:30 AM    HDL 71 03/30/2022 05:45 AM       Lab Results   Component Value Date/Time    TRIGLYCERIDE 228 (H) 01/26/2024 10:39 AM    TRIGLYCERIDE 205 (H) 06/21/2022 01:49 AM    TRIGLYCERIDE 178 (H) 03/31/2022 05:30 AM    TRIGLYCERIDE 177 (H) 03/30/2022 05:45 AM       Lab Results   Component Value Date/Time    CHOLSTRLTOT 141 01/26/2024 10:39 AM    CHOLSTRLTOT 167 06/21/2022 01:49 AM    CHOLSTRLTOT 159 03/31/2022 05:30 AM    CHOLSTRLTOT 207 (H) 03/30/2022 05:45 AM       No results found for: \"LIPOPROTA\"      Chemistries  Lab Results   Component Value Date/Time    CREATININE 1.05 01/26/2024 10:39 AM    CREATININE 1.52 (H) 06/23/2022 03:23 AM    CREATININE 1.67 (H) 06/22/2022 02:17 AM    CREATININE 1.45 (H) " "06/21/2022 01:49 AM    CREATININE 1.63 (H) 06/19/2022 10:45 PM     Lab Results   Component Value Date/Time    BUN 25 (H) 01/26/2024 10:39 AM    BUN 51 (H) 06/23/2022 03:23 AM    BUN 45 (H) 06/22/2022 02:17 AM    BUN 34 (H) 06/21/2022 01:49 AM    BUN 31 (H) 06/19/2022 10:45 PM     Lab Results   Component Value Date/Time    POTASSIUM 5.6 (H) 01/26/2024 10:39 AM    POTASSIUM 3.6 06/23/2022 03:23 AM    POTASSIUM 3.7 06/22/2022 02:17 AM     Lab Results   Component Value Date/Time    SODIUM 139 01/26/2024 10:39 AM    SODIUM 137 06/23/2022 03:23 AM    SODIUM 138 06/22/2022 02:17 AM     Lab Results   Component Value Date/Time    GLUCOSE 134 (H) 01/26/2024 10:39 AM    GLUCOSE 147 (H) 06/23/2022 03:23 AM    GLUCOSE 161 (H) 06/22/2022 02:17 AM     Lab Results   Component Value Date/Time    ASTSGOT 56 (H) 06/19/2022 10:45 PM    ASTSGOT 84 (H) 03/30/2022 05:45 AM    ASTSGOT 82 (H) 03/29/2022 08:00 PM     Lab Results   Component Value Date/Time    ALTSGPT 34 06/19/2022 10:45 PM    ALTSGPT 74 (H) 03/30/2022 05:45 AM    ALTSGPT 80 (H) 03/29/2022 08:00 PM     Lab Results   Component Value Date/Time    ALKPHOSPHAT 91 06/19/2022 10:45 PM    ALKPHOSPHAT 87 03/30/2022 05:45 AM    ALKPHOSPHAT 94 03/29/2022 08:00 PM     Lab Results   Component Value Date/Time    HBA1C 8.1 (H) 06/19/2022 10:45 PM    HBA1C 9.3 (H) 03/31/2022 05:30 AM    HBA1C 9.3 (H) 03/30/2022 05:45 AM     No results found for: \"TSH\"  Lab Results   Component Value Date/Time    NTPROBNP 8049 (H) 06/21/2022 01:49 AM    NTPROBNP 26638 (H) 06/19/2022 10:45 PM    NTPROBNP 1335 (H) 04/03/2022 04:29 AM     Lab Results   Component Value Date/Time    TROPONINT 406 (H) 06/20/2022 05:14 AM    TROPONINT 402 (H) 06/20/2022 02:09 AM    TROPONINT 407 (H) 06/20/2022 01:02 AM     Blood Counts  Lab Results   Component Value Date/Time    HEMOGLOBIN 12.6 (L) 06/23/2022 03:23 AM    HEMOGLOBIN 12.6 (L) 06/22/2022 02:17 AM    HEMOGLOBIN 13.4 (L) 06/21/2022 01:49 AM     Lab Results   Component " Value Date/Time    PLATELETCT 321 06/23/2022 03:23 AM    PLATELETCT 320 06/22/2022 02:17 AM    PLATELETCT 385 06/21/2022 01:49 AM     Lab Results   Component Value Date/Time    WBC 6.7 06/23/2022 03:23 AM    WBC 8.7 06/22/2022 02:17 AM    WBC 12.7 (H) 06/21/2022 01:49 AM      Physical Examination:   General: Well-appearing, no acute distress  Eyes: Extraocular movements intact, anicteric  Neck: Full range of motion, no gross jugular venous distension  Pulmonary: Normal respiratory effort, no distress  Cardiovascular: Regular rate and rhythm  Extremities: Warm and well perfused, no lower extremity edema  Neurological: Alert and oriented, no gross focal motor deficits     Past History:   Past Medical History  The patient's past medical history was reviewed.  See HPI and self-reported patient medical history form for pertinent medical history to consultation.    Past Social History  The patient's social history was reviewed.  See [facility] self-reported patient medical history form for pertinent social history to consultation.    Past Family History  The patient's family history was reviewed.  See HPI self-reported patient medical history form for pertinent family history to consultation.    Review of Systems  A pertinent cardiac review of systems was performed and was otherwise unremarkable except as per HPI and self-reported patient medical history form.        Vinh Arevalo MD, [facility]  Interventional Cardiology  [facility], [address]  [address] [address]  Phone: [phone]  Fax: [phone]

## 2024-08-22 ENCOUNTER — APPOINTMENT (OUTPATIENT)
Dept: CARDIOLOGY | Facility: MEDICAL CENTER | Age: 69
End: 2024-08-22
Attending: INTERNAL MEDICINE
Payer: COMMERCIAL